# Patient Record
Sex: MALE | Race: WHITE | NOT HISPANIC OR LATINO | Employment: FULL TIME | URBAN - METROPOLITAN AREA
[De-identification: names, ages, dates, MRNs, and addresses within clinical notes are randomized per-mention and may not be internally consistent; named-entity substitution may affect disease eponyms.]

---

## 2017-02-27 ENCOUNTER — OFFICE VISIT (OUTPATIENT)
Dept: CARDIOLOGY | Facility: CLINIC | Age: 57
End: 2017-02-27

## 2017-02-27 VITALS
DIASTOLIC BLOOD PRESSURE: 80 MMHG | WEIGHT: 275.4 LBS | HEIGHT: 72 IN | BODY MASS INDEX: 37.3 KG/M2 | HEART RATE: 82 BPM | SYSTOLIC BLOOD PRESSURE: 148 MMHG

## 2017-02-27 DIAGNOSIS — E66.09 NON MORBID OBESITY DUE TO EXCESS CALORIES: ICD-10-CM

## 2017-02-27 DIAGNOSIS — I48.0 PAROXYSMAL ATRIAL FIBRILLATION (HCC): ICD-10-CM

## 2017-02-27 DIAGNOSIS — E11.59 TYPE 2 DIABETES MELLITUS WITH OTHER CIRCULATORY COMPLICATION: Primary | ICD-10-CM

## 2017-02-27 PROCEDURE — 93000 ELECTROCARDIOGRAM COMPLETE: CPT | Performed by: INTERNAL MEDICINE

## 2017-02-27 PROCEDURE — 99205 OFFICE O/P NEW HI 60 MIN: CPT | Performed by: INTERNAL MEDICINE

## 2017-02-27 RX ORDER — FLECAINIDE ACETATE 100 MG/1
100 TABLET ORAL 2 TIMES DAILY
COMMUNITY
End: 2017-08-01

## 2017-02-27 RX ORDER — LISINOPRIL 2.5 MG/1
2.5 TABLET ORAL DAILY
COMMUNITY
End: 2018-10-31

## 2017-06-23 ENCOUNTER — TELEPHONE (OUTPATIENT)
Dept: CARDIOLOGY | Facility: CLINIC | Age: 57
End: 2017-06-23

## 2017-06-23 NOTE — TELEPHONE ENCOUNTER
----- Message from Lashonda Hanna MA sent at 6/23/2017 12:38 PM EDT -----  Regarding: Patient needing appt  Contact: [phone]  Patient had to cancel his appt with Dr. Iniguez due to him having to go out of town.  Scheduling told him next available was January, and he would like to know if you could get him in sooner.    Thanks!

## 2017-08-01 ENCOUNTER — OFFICE VISIT (OUTPATIENT)
Dept: CARDIOLOGY | Facility: CLINIC | Age: 57
End: 2017-08-01

## 2017-08-01 VITALS
DIASTOLIC BLOOD PRESSURE: 80 MMHG | SYSTOLIC BLOOD PRESSURE: 110 MMHG | BODY MASS INDEX: 36.44 KG/M2 | HEIGHT: 72 IN | WEIGHT: 269 LBS | HEART RATE: 68 BPM

## 2017-08-01 DIAGNOSIS — I48.0 PAROXYSMAL ATRIAL FIBRILLATION (HCC): Primary | ICD-10-CM

## 2017-08-01 PROCEDURE — 99214 OFFICE O/P EST MOD 30 MIN: CPT | Performed by: INTERNAL MEDICINE

## 2017-08-01 RX ORDER — FLECAINIDE ACETATE 50 MG/1
50 TABLET ORAL 2 TIMES DAILY
Start: 2017-08-01 | End: 2017-08-14 | Stop reason: SDUPTHER

## 2017-08-01 NOTE — PROGRESS NOTES
Date of Office Visit: 2017  Encounter Provider: Alan Iniguez MD  Place of Service: [facility] CARDIOLOGY  Patient Name: Dallas Hurt  :1960  [id number]    Chief Complaint   Patient presents with   • Atrial Fibrillation   :     HPI: Dallas Hurt is a 57 y.o. male  here for follow-up of his paroxysmal A. fib he's been doing okay flow is about 6 pounds since I saw him last studies 269 pounds it is a start he is a gentleman that had A. fib in the past it was asymptomatic noted during a colonoscopy he's been aggressively treated he had an A. fib ablation then he was treated with T [place] and then with sotalol and now is been on flecainide he's never had symptoms with this he had a normal stress test normal echo in  is been feeling okay although he does not like the taste of the flecainide    Past Medical History:   Diagnosis Date   • Atrial fibrillation    • Atrial flutter    • Diabetes mellitus    • Heart disease    • Hyperlipidemia    • Hypertension    • Low testosterone        Past Surgical History:   Procedure Laterality Date   • ABLATION OF DYSRHYTHMIC FOCUS     • COLONOSCOPY         Social History     Social History   • Marital status:      Spouse name: N/A   • Number of children: N/A   • Years of education: N/A     Occupational History   • Not on file.     Social History Main Topics   • Smoking status: Former Smoker   • Smokeless tobacco: Not on file   • Alcohol use No   • Drug use: No   • Sexual activity: Not on file     Other Topics Concern   • Not on file     Social History Narrative       Family History   Problem Relation Age of Onset   • Diabetes Other    • Diabetes Mother    • Atrial fibrillation Father    • Diabetes Father    • Cancer Father    • Atrial fibrillation Brother    • Hypertension Brother    • Aneurysm Paternal Grandmother        Review of Systems   Constitution: Negative for decreased appetite, fever, malaise/fatigue and weight loss.    HENT: Negative for nosebleeds.    Eyes: Negative for double vision.   Cardiovascular: Negative for chest pain, claudication, cyanosis, dyspnea on exertion, irregular heartbeat, leg swelling, near-syncope, orthopnea, palpitations, paroxysmal nocturnal dyspnea and syncope.   Respiratory: Negative for cough, hemoptysis and shortness of breath.    Hematologic/Lymphatic: Negative for bleeding problem.   Skin: Negative for rash.   Musculoskeletal: Negative for falls and myalgias.   Gastrointestinal: Negative for hematochezia, jaundice, melena, nausea and vomiting.   Genitourinary: Negative for hematuria.   Neurological: Negative for dizziness and seizures.   Psychiatric/Behavioral: Negative for altered mental status and memory loss.       Allergies   Allergen Reactions   • Morphine And Related          Current Outpatient Prescriptions:   •  atorvastatin (LIPITOR) 20 MG tablet, Take 20 mg by mouth Daily., Disp: , Rfl:   •  cetirizine (zyrTEC) 10 MG tablet, Take 10 mg by mouth Daily., Disp: , Rfl:   •  cyclobenzaprine (FLEXERIL) 10 MG tablet, Take 10 mg by mouth 3 (Three) Times a Day As Needed for muscle spasms., Disp: , Rfl:   •  dapagliflozin (FARXIGA) 5 MG tablet tablet, Take 5 mg by mouth Daily., Disp: , Rfl:   •  Dulaglutide (TRULICITY) 0.75 MG/0.5ML solution pen-injector, Inject  under the skin., Disp: , Rfl:   •  flecainide (TAMBOCOR) 50 MG tablet, Take 1 tablet by mouth 2 (Two) Times a Day., Disp: , Rfl:   •  lisinopril (PRINIVIL,ZESTRIL) 2.5 MG tablet, Take 2.5 mg by mouth Daily., Disp: , Rfl:   •  metFORMIN (GLUCOPHAGE) 500 MG tablet, Take 500 mg by mouth 2 (Two) Times a Day With Meals., Disp: , Rfl:   •  montelukast (SINGULAIR) 10 MG tablet, Take 10 mg by mouth Every Night., Disp: , Rfl:   •  naproxen sodium (ALEVE) 220 MG tablet, Take 220 mg by mouth 2 (Two) Times a Day As Needed for mild pain (1-3)., Disp: , Rfl:   •  omeprazole (priLOSEC) 20 MG capsule, Take 20 mg by mouth Daily., Disp: , Rfl:   •   "rivaroxaban (XARELTO) 20 MG tablet, Take 20 mg by mouth Daily., Disp: , Rfl:   •  sildenafil (VIAGRA) 100 MG tablet, Take 100 mg by mouth Daily As Needed for erectile dysfunction., Disp: , Rfl:   •  SITagliptin (JANUVIA) 100 MG tablet, Take 100 mg by mouth Daily., Disp: , Rfl:      Objective:     Vitals:    08/01/17 1450   BP: 110/80   Pulse: 68   Weight: 269 lb (122 kg)   Height: 72\" (182.9 cm)     Body mass index is 36.48 kg/(m^2).    Physical Exam   Constitutional: He is oriented to person, place, and time. He appears well-developed and well-nourished.   overweight   HENT:   Head: Normocephalic.   Eyes: No scleral icterus.   Neck: No JVD present. No thyromegaly present.   Cardiovascular: Normal rate, regular rhythm and normal heart sounds.  Exam reveals no gallop and no friction rub.    No murmur heard.  Pulmonary/Chest: Effort normal and breath sounds normal. He has no wheezes. He has no rales.   Abdominal: Soft. There is no hepatosplenomegaly. There is no tenderness.   Musculoskeletal: Normal range of motion. He exhibits no edema.   Lymphadenopathy:     He has no cervical adenopathy.   Neurological: He is alert and oriented to person, place, and time.   Skin: Skin is warm and dry. No rash noted.   Psychiatric: He has a normal mood and affect.         ECG 12 Lead  Date/Time: 8/1/2017 3:46 PM  Performed by: EDITH DEL VALLE  Authorized by: EDITH DEL VALLE   Clinical impression: abnormal ECG  Comments: Junctional rhythm             Assessment:       Diagnosis Plan   1. Paroxysmal atrial fibrillation            Plan:       If this point is in a junctional rhythm today is fairly asymptomatic with an ominous stop his digoxin and cut his flecainide and have to 50 mg twice a day, have him come back and see us in 6 months I'm encouraged him to try and lose more weight he's been asymptomatic with his A. fib heHas a chads 2 Vascor 0 and has not anticoagulated    As always, it has been a pleasure to participate in your " patient's care.    Atrial Fibrillation and Atrial Flutter  Assessment  • The patient has paroxysmal atrial fibrillation  • This is non-valvular in etiology  • The patient's CHADS2-VASc score is 0  • A HAF5AC2-QTSb score of 0 is considered a low risk for a thromboembolic event    Plan  • Attempt to maintain sinus rhythm  • Continue flecainide for rhythm control      Sincerely,       Alan Iniguez MD

## 2017-08-14 RX ORDER — FLECAINIDE ACETATE 50 MG/1
50 TABLET ORAL 2 TIMES DAILY
Qty: 60 TABLET | Refills: 3 | Status: SHIPPED | OUTPATIENT
Start: 2017-08-14 | End: 2017-12-13 | Stop reason: SDUPTHER

## 2017-12-13 RX ORDER — FLECAINIDE ACETATE 50 MG/1
TABLET ORAL
Qty: 180 TABLET | Refills: 3 | Status: SHIPPED | OUTPATIENT
Start: 2017-12-13 | End: 2018-12-11 | Stop reason: SDUPTHER

## 2018-01-12 ENCOUNTER — CONVERSION ENCOUNTER (OUTPATIENT)
Dept: FAMILY MEDICINE CLINIC | Facility: CLINIC | Age: 58
End: 2018-01-12

## 2018-01-12 ENCOUNTER — OFFICE VISIT CONVERTED (OUTPATIENT)
Dept: FAMILY MEDICINE CLINIC | Facility: CLINIC | Age: 58
End: 2018-01-12
Attending: NURSE PRACTITIONER

## 2018-02-02 ENCOUNTER — OFFICE VISIT (OUTPATIENT)
Dept: CARDIOLOGY | Facility: CLINIC | Age: 58
End: 2018-02-02

## 2018-02-02 VITALS
HEART RATE: 79 BPM | DIASTOLIC BLOOD PRESSURE: 68 MMHG | OXYGEN SATURATION: 98 % | HEIGHT: 72 IN | SYSTOLIC BLOOD PRESSURE: 118 MMHG | BODY MASS INDEX: 36.7 KG/M2 | WEIGHT: 271 LBS

## 2018-02-02 DIAGNOSIS — I48.0 PAROXYSMAL ATRIAL FIBRILLATION (HCC): Primary | ICD-10-CM

## 2018-02-02 PROCEDURE — 93000 ELECTROCARDIOGRAM COMPLETE: CPT | Performed by: PHYSICIAN ASSISTANT

## 2018-02-02 PROCEDURE — 99213 OFFICE O/P EST LOW 20 MIN: CPT | Performed by: PHYSICIAN ASSISTANT

## 2018-02-02 RX ORDER — DOCUSATE SODIUM 100 MG/1
100 CAPSULE, LIQUID FILLED ORAL DAILY
COMMUNITY

## 2018-02-02 RX ORDER — AMOXICILLIN 875 MG/1
TABLET, COATED ORAL
Refills: 0 | COMMUNITY
Start: 2018-01-31 | End: 2018-10-31

## 2018-02-02 RX ORDER — EMPAGLIFLOZIN, METFORMIN HYDROCHLORIDE 12.5; 1 MG/1; MG/1
1 TABLET, EXTENDED RELEASE ORAL 2 TIMES DAILY
COMMUNITY
Start: 2018-01-31 | End: 2021-07-01

## 2018-02-02 RX ORDER — BENZONATATE 100 MG/1
100 CAPSULE ORAL 2 TIMES DAILY PRN
COMMUNITY
Start: 2018-01-12 | End: 2018-10-31

## 2018-02-02 RX ORDER — DULAGLUTIDE 3 MG/.5ML
1.5 INJECTION, SOLUTION SUBCUTANEOUS WEEKLY
COMMUNITY
Start: 2018-01-19 | End: 2021-09-03

## 2018-02-02 NOTE — PROGRESS NOTES
Date of Office Visit: 2018  Encounter Provider: SERA Ybarra  Place of Service: [facility] CARDIOLOGY  Patient Name: Dallas Hurt  :1960    Chief Complaint   Patient presents with   • Atrial Fibrillation     6 month follow up   :     HPI: Dallas Hurt is a 57 y.o. male, new to me, who presents today for follow-up.  Old records have been obtained and reviewed by me.  He is a patient of Dr. Iniguez's with a past cardiac history significant for paroxysmal atrial fibrillation.  He has had an ablation, and then since then has been treated with Tikosyn, sotalol, and now flecainide.  He had a normal stress echocardiogram in .  He was last in our office to see Dr. Iniguez on 2017.  At that visit he was doing well from a cardiac standpoint.  He had lost about 6 pounds.  He was in a junctional rhythm, and Dr. Iniguez discontinued his digitoxin and decreased his flecainide to 50 mg twice a day.  He is not anticoagulated secondary to a chads 2 vascular score of 0.  He's here today for 6 month follow-up.   Over the past 6 months he's been doing fairly well.  He is very active.  He has made slow changes to his diet to help lose weight.  For example, he stopped eating candy bars.  He switched from regular Pepsi to Coke Zero.  He is stopped eating dairy products for the most part.  He denies any chest pain, palpitations, shortness of breath, edema, dizziness, or syncope.  He is very active draining emergency personnel.  He used to weigh 305 pounds, he's down to 270.      Past Medical History:   Diagnosis Date   • Atrial fibrillation    • Atrial flutter    • Diabetes mellitus    • Heart disease    • Hyperlipidemia    • Hypertension    • Low testosterone        Past Surgical History:   Procedure Laterality Date   • ABLATION OF DYSRHYTHMIC FOCUS     • COLONOSCOPY         Social History     Social History   • Marital status:      Spouse name: N/A   • Number of children: N/A    • Years of education: N/A     Occupational History   • Not on file.     Social History Main Topics   • Smoking status: Former Smoker   • Smokeless tobacco: Never Used   • Alcohol use No   • Drug use: No   • Sexual activity: Defer     Other Topics Concern   • Not on file     Social History Narrative       Family History   Problem Relation Age of Onset   • Diabetes Other    • Diabetes Mother    • Atrial fibrillation Father    • Diabetes Father    • Cancer Father    • Atrial fibrillation Brother    • Hypertension Brother    • Aneurysm Paternal Grandmother    • No Known Problems Maternal Grandmother    • No Known Problems Maternal Grandfather    • No Known Problems Paternal Grandfather        Review of Systems   Constitution: Negative for chills, fever, malaise/fatigue, weight gain and weight loss.   HENT: Negative for ear pain, hearing loss, nosebleeds and sore throat.    Eyes: Negative for double vision, pain and visual disturbance.   Cardiovascular: Negative for chest pain, dyspnea on exertion, irregular heartbeat, leg swelling, near-syncope, orthopnea, palpitations, paroxysmal nocturnal dyspnea and syncope.   Respiratory: Negative for cough, shortness of breath, sleep disturbances due to breathing, snoring and wheezing.    Endocrine: Negative for cold intolerance, heat intolerance and polyuria.   Skin: Negative for itching and rash.   Musculoskeletal: Negative for joint pain, joint swelling and myalgias.   Gastrointestinal: Negative for abdominal pain, diarrhea, melena, nausea and vomiting.   Genitourinary: Negative for frequency, hematuria and hesitancy.   Neurological: Negative for excessive daytime sleepiness, headaches, light-headedness, numbness, paresthesias and seizures.   Psychiatric/Behavioral: Negative for altered mental status and depression.   Allergic/Immunologic: Negative.    All other systems reviewed and are negative.      Allergies   Allergen Reactions   • Morphine And Related          Current  "Outpatient Prescriptions:   •  amoxicillin (AMOXIL) 875 MG tablet, take 1 tablet by mouth twice a day for 10 days, Disp: , Rfl: 0  •  atorvastatin (LIPITOR) 20 MG tablet, Take 20 mg by mouth Daily., Disp: , Rfl:   •  benzonatate (TESSALON) 100 MG capsule, Take 100 mg by mouth 2 (Two) Times a Day As Needed., Disp: , Rfl:   •  cetirizine (zyrTEC) 10 MG tablet, Take 10 mg by mouth Daily., Disp: , Rfl:   •  cyclobenzaprine (FLEXERIL) 10 MG tablet, Take 10 mg by mouth 3 (Three) Times a Day As Needed for muscle spasms., Disp: , Rfl:   •  docusate sodium (COLACE) 100 MG capsule, Take 100 mg by mouth 2 (Two) Times a Day., Disp: , Rfl:   •  flecainide (TAMBOCOR) 50 MG tablet, TAKE ONE TABLET BY MOUTH TWICE DAILY, Disp: 180 tablet, Rfl: 3  •  lisinopril (PRINIVIL,ZESTRIL) 2.5 MG tablet, Take 2.5 mg by mouth Daily., Disp: , Rfl:   •  montelukast (SINGULAIR) 10 MG tablet, Take 10 mg by mouth Every Night., Disp: , Rfl:   •  naproxen sodium (ALEVE) 220 MG tablet, Take 320 mg by mouth 2 (Two) Times a Day As Needed for Mild Pain ., Disp: , Rfl:   •  omeprazole (priLOSEC) 20 MG capsule, Take 20 mg by mouth Daily., Disp: , Rfl:   •  rivaroxaban (XARELTO) 20 MG tablet, Take 20 mg by mouth Daily., Disp: , Rfl:   •  SITagliptin (JANUVIA) 100 MG tablet, Take 100 mg by mouth Daily., Disp: , Rfl:   •  SYNJARDY XR  MG tablet sustained-release 24 hour, Take 1 tablet by mouth Daily., Disp: , Rfl:   •  TRULICITY 1.5 MG/0.5ML solution pen-injector, Inject 1.5 mg as directed 1 (One) Time Per Week., Disp: , Rfl:      Objective:     Vitals:    02/02/18 1401 02/02/18 1418   BP: 110/64 118/68   BP Location: Right arm Left arm   Pulse: 79    SpO2: 98%    Weight: 123 kg (271 lb)    Height: 182.9 cm (72\")      Body mass index is 36.75 kg/(m^2).    PHYSICAL EXAM:    Physical Exam   Constitutional: He is oriented to person, place, and time. He appears well-developed and well-nourished. No distress.   HENT:   Head: Normocephalic and atraumatic. "   Eyes: Pupils are equal, round, and reactive to light.   Neck: No JVD present. No thyromegaly present.   Cardiovascular: Normal rate, regular rhythm, normal heart sounds and intact distal pulses.    No murmur heard.  Pulmonary/Chest: Effort normal and breath sounds normal. No respiratory distress.   Abdominal: Soft. Bowel sounds are normal. He exhibits no distension. There is no splenomegaly or hepatomegaly. There is no tenderness.   Musculoskeletal: Normal range of motion. He exhibits no edema.   Neurological: He is alert and oriented to person, place, and time.   Skin: Skin is warm and dry. He is not diaphoretic. No erythema.   Psychiatric: He has a normal mood and affect. His behavior is normal. Judgment normal.         ECG 12 Lead  Date/Time: 2/2/2018 2:25 PM  Performed by: ROSANGELA STEWART.  Authorized by: ROSANGELA STEWART.   Comparison: compared with previous ECG from 8/1/2017  Similar to previous ECG  Rhythm: sinus rhythm  BPM: 79  Conduction: 1st degree  Clinical impression: abnormal ECG  Comments: Indication: Paroxysmal atrial fibrillation.              Assessment:       Diagnosis Plan   1. Paroxysmal atrial fibrillation  ECG 12 Lead     Orders Placed This Encounter   Procedures   • ECG 12 Lead     This order was created via procedure documentation          Plan:       1.  Atrial Fibrillation and Atrial Flutter  Assessment  • The patient has paroxysmal atrial fibrillation  • This is non-valvular in etiology  • The patient's CHADS2-VASc score is 2  • A [id number] score of 2 or more is considered a high risk for a thromboembolic event  • Rivaroxaban prescribed    Plan  • Attempt to maintain sinus rhythm  • Continue rivaroxaban for antithrombotic therapy, bleeding issues discussed  • Continue flecainide for rhythm control    Subjective - Objective  • Overall he's doing well.  He has made some changes to his diet and has lost about 30 pounds overall.  There still room for improvement.  He is in sinus rhythm  today on flecainide, he is anticoagulated on Xarelto and not having any difficulty.  I did discuss with him ways that he can improve on his diet, and I have challenged him to try to lose 10 more pounds by the time he gets to see Dr. Iniguez in 6 months.  He states that he likes a challenge, and we'll try to work on it.  He did have some complaints of erectile dysfunction today, he has tried Viagra and it only gives him a headache.  From my standpoint, he needs to follow-up with his PCP or maybe even see a urologist.  He has no complaints of angina or heart failure, I'm not going to make any changes to his medical regimen and he will follow-up with Dr. Iniguez in 6 months.        As always, it has been a pleasure to participate in your patient's care.      Sincerely,         Kirstie Wagner PA-C

## 2018-02-12 ENCOUNTER — TELEPHONE (OUTPATIENT)
Dept: CARDIOLOGY | Facility: CLINIC | Age: 58
End: 2018-02-12

## 2018-05-17 ENCOUNTER — CONVERSION ENCOUNTER (OUTPATIENT)
Dept: FAMILY MEDICINE CLINIC | Facility: CLINIC | Age: 58
End: 2018-05-17

## 2018-05-17 ENCOUNTER — OFFICE VISIT CONVERTED (OUTPATIENT)
Dept: FAMILY MEDICINE CLINIC | Facility: CLINIC | Age: 58
End: 2018-05-17
Attending: NURSE PRACTITIONER

## 2018-10-31 ENCOUNTER — OFFICE VISIT (OUTPATIENT)
Dept: CARDIOLOGY | Facility: CLINIC | Age: 58
End: 2018-10-31

## 2018-10-31 VITALS
SYSTOLIC BLOOD PRESSURE: 120 MMHG | HEIGHT: 72 IN | WEIGHT: 274 LBS | BODY MASS INDEX: 37.11 KG/M2 | DIASTOLIC BLOOD PRESSURE: 70 MMHG | HEART RATE: 74 BPM

## 2018-10-31 DIAGNOSIS — I48.0 PAROXYSMAL ATRIAL FIBRILLATION (HCC): Primary | ICD-10-CM

## 2018-10-31 DIAGNOSIS — E66.09 NON MORBID OBESITY DUE TO EXCESS CALORIES: ICD-10-CM

## 2018-10-31 PROCEDURE — 99214 OFFICE O/P EST MOD 30 MIN: CPT | Performed by: INTERNAL MEDICINE

## 2018-10-31 PROCEDURE — 93000 ELECTROCARDIOGRAM COMPLETE: CPT | Performed by: INTERNAL MEDICINE

## 2018-10-31 RX ORDER — SILDENAFIL 100 MG/1
100 TABLET, FILM COATED ORAL DAILY PRN
COMMUNITY
End: 2020-12-14

## 2018-10-31 RX ORDER — LANSOPRAZOLE 30 MG/1
30 CAPSULE, DELAYED RELEASE ORAL DAILY
COMMUNITY
End: 2021-06-24

## 2018-12-11 ENCOUNTER — OFFICE VISIT CONVERTED (OUTPATIENT)
Dept: FAMILY MEDICINE CLINIC | Facility: CLINIC | Age: 58
End: 2018-12-11
Attending: NURSE PRACTITIONER

## 2018-12-11 RX ORDER — FLECAINIDE ACETATE 50 MG/1
TABLET ORAL
Qty: 180 TABLET | Refills: 3 | Status: SHIPPED | OUTPATIENT
Start: 2018-12-11 | End: 2019-12-16 | Stop reason: SDUPTHER

## 2019-04-15 ENCOUNTER — HOSPITAL ENCOUNTER (OUTPATIENT)
Dept: LAB | Facility: HOSPITAL | Age: 59
Discharge: HOME | End: 2019-04-15
Attending: NURSE PRACTITIONER

## 2019-04-15 LAB
EST. AVERAGE GLUCOSE BLD GHB EST-MCNC: 217 MG/DL
HBA1C MFR BLD: 9.2 % (ref 3.5–5.7)

## 2019-06-04 ENCOUNTER — HOSPITAL ENCOUNTER (OUTPATIENT)
Dept: DIABETES SERVICES | Facility: HOSPITAL | Age: 59
Discharge: HOME | End: 2019-06-04
Attending: NURSE PRACTITIONER

## 2019-06-04 ENCOUNTER — OFFICE VISIT CONVERTED (OUTPATIENT)
Dept: DIABETES SERVICES | Facility: HOSPITAL | Age: 59
End: 2019-06-04
Attending: NURSE PRACTITIONER

## 2019-06-21 ENCOUNTER — OFFICE VISIT CONVERTED (OUTPATIENT)
Dept: FAMILY MEDICINE CLINIC | Facility: CLINIC | Age: 59
End: 2019-06-21
Attending: NURSE PRACTITIONER

## 2019-06-21 ENCOUNTER — HOSPITAL ENCOUNTER (OUTPATIENT)
Dept: LAB | Facility: HOSPITAL | Age: 59
Discharge: HOME | End: 2019-06-21
Attending: NURSE PRACTITIONER

## 2019-06-21 LAB
ALBUMIN SERPL-MCNC: 4.5 G/DL (ref 3.5–5)
ALBUMIN/GLOB SERPL: 1.5 {RATIO} (ref 1.4–2.6)
ALP SERPL-CCNC: 84 U/L (ref 56–119)
ALT SERPL-CCNC: 35 U/L (ref 10–40)
ANION GAP SERPL CALC-SCNC: 24 MMOL/L (ref 8–19)
AST SERPL-CCNC: 20 U/L (ref 15–50)
BASOPHILS # BLD AUTO: 0.09 10*3/UL (ref 0–0.2)
BASOPHILS NFR BLD AUTO: 1.2 % (ref 0–3)
BILIRUB SERPL-MCNC: 0.48 MG/DL (ref 0.2–1.3)
BUN SERPL-MCNC: 16 MG/DL (ref 5–25)
BUN/CREAT SERPL: 14 {RATIO} (ref 6–20)
CALCIUM SERPL-MCNC: 9.8 MG/DL (ref 8.7–10.4)
CHLORIDE SERPL-SCNC: 98 MMOL/L (ref 99–111)
CHOLEST SERPL-MCNC: 164 MG/DL (ref 107–200)
CHOLEST/HDLC SERPL: 4.4 {RATIO} (ref 3–6)
CONV ABS IMM GRAN: 0.07 10*3/UL (ref 0–0.2)
CONV CO2: 20 MMOL/L (ref 22–32)
CONV CREATININE URINE, RANDOM: 55.7 MG/DL (ref 10–300)
CONV IMMATURE GRAN: 0.9 % (ref 0–1.8)
CONV MICROALBUM.,U,RANDOM: 31.2 MG/L (ref 0–20)
CONV TOTAL PROTEIN: 7.6 G/DL (ref 6.3–8.2)
CREAT UR-MCNC: 1.15 MG/DL (ref 0.7–1.2)
DEPRECATED RDW RBC AUTO: 39.8 FL (ref 35.1–43.9)
EOSINOPHIL # BLD AUTO: 0.13 10*3/UL (ref 0–0.7)
EOSINOPHIL # BLD AUTO: 1.7 % (ref 0–7)
ERYTHROCYTE [DISTWIDTH] IN BLOOD BY AUTOMATED COUNT: 12.1 % (ref 11.6–14.4)
EST. AVERAGE GLUCOSE BLD GHB EST-MCNC: 217 MG/DL
GFR SERPLBLD BASED ON 1.73 SQ M-ARVRAT: >60 ML/MIN/{1.73_M2}
GLOBULIN UR ELPH-MCNC: 3.1 G/DL (ref 2–3.5)
GLUCOSE SERPL-MCNC: 186 MG/DL (ref 70–99)
HBA1C MFR BLD: 16.9 G/DL (ref 14–18)
HBA1C MFR BLD: 9.2 % (ref 3.5–5.7)
HCT VFR BLD AUTO: 53 % (ref 42–52)
HDLC SERPL-MCNC: 37 MG/DL (ref 40–60)
LDLC SERPL CALC-MCNC: 84 MG/DL (ref 70–100)
LYMPHOCYTES # BLD AUTO: 1.9 10*3/UL (ref 1–5)
MCH RBC QN AUTO: 28.7 PG (ref 27–31)
MCHC RBC AUTO-ENTMCNC: 31.9 G/DL (ref 33–37)
MCV RBC AUTO: 90.1 FL (ref 80–96)
MICROALBUMIN/CREAT UR: 56 MG/G{CRE} (ref 0–25)
MONOCYTES # BLD AUTO: 0.75 10*3/UL (ref 0.2–1.2)
MONOCYTES NFR BLD AUTO: 9.7 % (ref 3–10)
NEUTROPHILS # BLD AUTO: 4.82 10*3/UL (ref 2–8)
NEUTROPHILS NFR BLD AUTO: 62 % (ref 30–85)
NRBC CBCN: 0 % (ref 0–0.7)
OSMOLALITY SERPL CALC.SUM OF ELEC: 292 MOSM/KG (ref 273–304)
PLATELET # BLD AUTO: 221 10*3/UL (ref 130–400)
PMV BLD AUTO: 10.9 FL (ref 9.4–12.4)
POTASSIUM SERPL-SCNC: 4.4 MMOL/L (ref 3.5–5.3)
PSA SERPL-MCNC: 0.37 NG/ML (ref 0–4)
RBC # BLD AUTO: 5.88 10*6/UL (ref 4.7–6.1)
SODIUM SERPL-SCNC: 138 MMOL/L (ref 135–147)
T4 FREE SERPL-MCNC: 1.1 NG/DL (ref 0.9–1.8)
TRIGL SERPL-MCNC: 214 MG/DL (ref 40–150)
TSH SERPL-ACNC: 2.23 M[IU]/L (ref 0.27–4.2)
VARIANT LYMPHS NFR BLD MANUAL: 24.5 % (ref 20–45)
VLDLC SERPL-MCNC: 43 MG/DL (ref 5–37)
WBC # BLD AUTO: 7.76 10*3/UL (ref 4.8–10.8)

## 2019-07-02 ENCOUNTER — OFFICE VISIT CONVERTED (OUTPATIENT)
Dept: DIABETES SERVICES | Facility: HOSPITAL | Age: 59
End: 2019-07-02
Attending: NURSE PRACTITIONER

## 2019-07-02 ENCOUNTER — HOSPITAL ENCOUNTER (OUTPATIENT)
Dept: GENERAL RADIOLOGY | Facility: HOSPITAL | Age: 59
Discharge: HOME | End: 2019-07-02
Attending: NURSE PRACTITIONER

## 2019-07-02 ENCOUNTER — HOSPITAL ENCOUNTER (OUTPATIENT)
Dept: DIABETES SERVICES | Facility: HOSPITAL | Age: 59
Setting detail: RECURRING SERIES
Discharge: HOME | End: 2019-07-31
Attending: NURSE PRACTITIONER

## 2019-09-04 ENCOUNTER — OFFICE VISIT CONVERTED (OUTPATIENT)
Dept: DIABETES SERVICES | Facility: HOSPITAL | Age: 59
End: 2019-09-04
Attending: NURSE PRACTITIONER

## 2019-09-04 ENCOUNTER — HOSPITAL ENCOUNTER (OUTPATIENT)
Dept: OTHER | Facility: HOSPITAL | Age: 59
Discharge: HOME | End: 2019-09-04
Attending: NURSE PRACTITIONER

## 2019-09-04 ENCOUNTER — HOSPITAL ENCOUNTER (OUTPATIENT)
Dept: DIABETES SERVICES | Facility: HOSPITAL | Age: 59
Discharge: HOME | End: 2019-09-04
Attending: NURSE PRACTITIONER

## 2019-09-04 LAB
EST. AVERAGE GLUCOSE BLD GHB EST-MCNC: 235 MG/DL
HBA1C MFR BLD: 9.8 % (ref 3.5–5.7)

## 2019-10-21 ENCOUNTER — HOSPITAL ENCOUNTER (OUTPATIENT)
Dept: OTHER | Facility: HOSPITAL | Age: 59
Discharge: HOME | End: 2019-10-21
Attending: NURSE PRACTITIONER

## 2019-10-21 LAB
EST. AVERAGE GLUCOSE BLD GHB EST-MCNC: 212 MG/DL
HBA1C MFR BLD: 9 % (ref 3.5–5.7)

## 2019-10-22 ENCOUNTER — OFFICE VISIT CONVERTED (OUTPATIENT)
Dept: DIABETES SERVICES | Facility: HOSPITAL | Age: 59
End: 2019-10-22
Attending: NURSE PRACTITIONER

## 2019-12-11 ENCOUNTER — OFFICE VISIT (OUTPATIENT)
Dept: CARDIOLOGY | Facility: CLINIC | Age: 59
End: 2019-12-11

## 2019-12-11 VITALS
HEART RATE: 80 BPM | BODY MASS INDEX: 36.7 KG/M2 | SYSTOLIC BLOOD PRESSURE: 126 MMHG | HEIGHT: 72 IN | DIASTOLIC BLOOD PRESSURE: 70 MMHG | WEIGHT: 271 LBS

## 2019-12-11 DIAGNOSIS — I48.0 PAROXYSMAL ATRIAL FIBRILLATION (HCC): ICD-10-CM

## 2019-12-11 DIAGNOSIS — E66.09 NON MORBID OBESITY DUE TO EXCESS CALORIES: ICD-10-CM

## 2019-12-11 DIAGNOSIS — E11.59 TYPE 2 DIABETES MELLITUS WITH OTHER CIRCULATORY COMPLICATION, WITHOUT LONG-TERM CURRENT USE OF INSULIN (HCC): Primary | ICD-10-CM

## 2019-12-11 PROCEDURE — 99213 OFFICE O/P EST LOW 20 MIN: CPT | Performed by: INTERNAL MEDICINE

## 2019-12-11 RX ORDER — DIPHENHYDRAMINE HCL 25 MG
25 TABLET ORAL AS NEEDED
COMMUNITY
End: 2022-11-10

## 2019-12-11 RX ORDER — BENZONATATE 100 MG/1
100 CAPSULE ORAL AS NEEDED
COMMUNITY
End: 2021-06-23

## 2019-12-11 NOTE — PROGRESS NOTES
Date of Office Visit:19  Encounter Provider: Alan Iniguez MD  Place of Service: [facility] CARDIOLOGY  Patient Name: Dallas Hurt  :1960  [id number]    Chief Complaint   Patient presents with   • Atrial Fibrillation   :     HPI: Dallas Hurt is a 59 y.o. male  he's a gentleman that's had paroxysmal A. fib and is here for follow-up  He had a history of an AFib ablation in the past prior to seeing me.  He then had a recurrence and had been tried on Tikosyn, sotalol, and when I saw him, I switched him off that on to flecainide and he has done well.  He has not had any A. fib that he knows of sounds like just about everyone in his family has had A. fib he even has a young son that had one an ablation recently.  He otherwise is been doing well without symptoms.  He has a chads 2 Vascor of 2 in the past we have not really wanted to treat him with anticoagulation but he has recently been diagnosed with diabetes and so previously it was a chads 2 Vascor of 0.  He has a fantastic job teaching farm rescue    Past Medical History:   Diagnosis Date   • Atrial fibrillation (CMS/HCC)    • Atrial flutter (CMS/HCC)    • Diabetes mellitus (CMS/HCC)    • Hyperlipidemia    • Low testosterone        Past Surgical History:   Procedure Laterality Date   • ABLATION OF DYSRHYTHMIC FOCUS     • COLONOSCOPY         Social History     Socioeconomic History   • Marital status:      Spouse name: Not on file   • Number of children: Not on file   • Years of education: Not on file   • Highest education level: Not on file   Tobacco Use   • Smoking status: Former Smoker   • Smokeless tobacco: Never Used   Substance and Sexual Activity   • Alcohol use: No   • Drug use: No   • Sexual activity: Defer       Family History   Problem Relation Age of Onset   • Diabetes Other    • Diabetes Mother    • Atrial fibrillation Father    • Diabetes Father    • Cancer Father    • Atrial fibrillation Brother    •  Hypertension Brother    • Aneurysm Paternal Grandmother    • No Known Problems Maternal Grandmother    • No Known Problems Maternal Grandfather    • No Known Problems Paternal Grandfather        Review of Systems   Constitution: Negative for decreased appetite, fever, malaise/fatigue and weight loss.   HENT: Negative for nosebleeds.    Eyes: Negative for double vision.   Cardiovascular: Negative for chest pain, claudication, cyanosis, dyspnea on exertion, irregular heartbeat, leg swelling, near-syncope, orthopnea, palpitations, paroxysmal nocturnal dyspnea and syncope.   Respiratory: Negative for cough, hemoptysis and shortness of breath.    Hematologic/Lymphatic: Negative for bleeding problem.   Skin: Negative for rash.   Musculoskeletal: Negative for falls and myalgias.   Gastrointestinal: Negative for hematochezia, jaundice, melena, nausea and vomiting.   Genitourinary: Negative for hematuria.   Neurological: Negative for dizziness and seizures.   Psychiatric/Behavioral: Negative for altered mental status and memory loss.       Allergies   Allergen Reactions   • Morphine And Related          Current Outpatient Medications:   •  atorvastatin (LIPITOR) 20 MG tablet, Take 20 mg by mouth Daily., Disp: , Rfl:   •  benzonatate (TESSALON) 100 MG capsule, Take 100 mg by mouth As Needed for Cough., Disp: , Rfl:   •  cetirizine (zyrTEC) 10 MG tablet, Take 10 mg by mouth Daily., Disp: , Rfl:   •  cyclobenzaprine (FLEXERIL) 10 MG tablet, Take 10 mg by mouth 3 (Three) Times a Day As Needed for muscle spasms., Disp: , Rfl:   •  diphenhydrAMINE (BENADRYL) 25 MG tablet, Take 25 mg by mouth As Needed for Itching., Disp: , Rfl:   •  docusate sodium (COLACE) 100 MG capsule, Take 100 mg by mouth 2 (Two) Times a Day., Disp: , Rfl:   •  Empagliflozin-metFORMIN HCl (SYNJARDY) 12.5-1000 MG tablet, Take  by mouth 2 (Two) Times a Day., Disp: , Rfl:   •  flecainide (TAMBOCOR) 50 MG tablet, TAKE ONE TABLET BY MOUTH TWICE DAILY, Disp: 180  "tablet, Rfl: 3  •  lansoprazole (PREVACID) 30 MG capsule, Take 30 mg by mouth Daily., Disp: , Rfl:   •  montelukast (SINGULAIR) 10 MG tablet, Take 10 mg by mouth Every Night., Disp: , Rfl:   •  naproxen sodium (ALEVE) 220 MG tablet, Take 320 mg by mouth 2 (Two) Times a Day As Needed for Mild Pain ., Disp: , Rfl:   •  sildenafil (VIAGRA) 100 MG tablet, Take 100 mg by mouth Daily As Needed for erectile dysfunction., Disp: , Rfl:   •  SITagliptin (JANUVIA) 100 MG tablet, Take 100 mg by mouth Daily., Disp: , Rfl:   •  TRULICITY 1.5 MG/0.5ML solution pen-injector, Inject 1.5 mg as directed 1 (One) Time Per Week., Disp: , Rfl:   •  SYNJARDY XR  MG tablet sustained-release 24 hour, Take 1 tablet by mouth Daily., Disp: , Rfl:      Objective:     Vitals:    12/11/19 1048   BP: 126/70   Pulse: 80   Weight: 123 kg (271 lb)   Height: 182.9 cm (72\")     Body mass index is 36.75 kg/m².    Physical Exam   Constitutional: He is oriented to person, place, and time. He appears well-developed and well-nourished.   overweight   HENT:   Head: Normocephalic.   Eyes: No scleral icterus.   Neck: No JVD present. No thyromegaly present.   Cardiovascular: Normal rate, regular rhythm and normal heart sounds. Exam reveals no gallop and no friction rub.   No murmur heard.  Pulmonary/Chest: Effort normal and breath sounds normal. He has no wheezes. He has no rales.   Abdominal: Soft. There is no hepatosplenomegaly. There is no tenderness.   Musculoskeletal: Normal range of motion. He exhibits no edema.   Lymphadenopathy:     He has no cervical adenopathy.   Neurological: He is alert and oriented to person, place, and time.   Skin: Skin is warm and dry. No rash noted.   Psychiatric: He has a normal mood and affect.       Procedures     Assessment:       Diagnosis Plan   1. Type 2 diabetes mellitus with other circulatory complication, without long-term current use of insulin (CMS/HCC)     2. Paroxysmal atrial fibrillation (CMS/HCC)     3. " Non morbid obesity due to excess calories            Plan:       In general I think he is doing okay although now with his diabetes diagnosis he has an elevated chads 2 Vascor.  Working to stay off of the anticoagulant for right now it is a little bit of a judgment call I do not think he has much A. fib.  If he recurs with his A. fib then we cannot for sure anticoagulate him but also probably send him to see the EP service because he is been on a number of antiarrhythmics.  I asked him again to try and lose a little bit of weight if possible I will see him back in a year    As always, it has been a pleasure to participate in your patient's care.    Sincerely,       Alan Iniguez MD

## 2019-12-16 RX ORDER — FLECAINIDE ACETATE 50 MG/1
TABLET ORAL
Qty: 180 TABLET | Refills: 2 | Status: SHIPPED | OUTPATIENT
Start: 2019-12-16 | End: 2020-09-23

## 2020-01-16 ENCOUNTER — OFFICE VISIT CONVERTED (OUTPATIENT)
Dept: FAMILY MEDICINE CLINIC | Facility: CLINIC | Age: 60
End: 2020-01-16
Attending: NURSE PRACTITIONER

## 2020-09-23 RX ORDER — FLECAINIDE ACETATE 50 MG/1
TABLET ORAL
Qty: 180 TABLET | Refills: 2 | Status: SHIPPED | OUTPATIENT
Start: 2020-09-23 | End: 2021-06-04

## 2020-11-11 ENCOUNTER — HOSPITAL ENCOUNTER (OUTPATIENT)
Dept: OTHER | Facility: HOSPITAL | Age: 60
Discharge: HOME | End: 2020-11-11
Attending: INTERNAL MEDICINE

## 2020-11-11 ENCOUNTER — OFFICE VISIT CONVERTED (OUTPATIENT)
Dept: INTERNAL MEDICINE | Facility: CLINIC | Age: 60
End: 2020-11-11
Attending: INTERNAL MEDICINE

## 2020-11-11 LAB
ALBUMIN SERPL-MCNC: 4.4 G/DL (ref 3.5–5)
ALBUMIN/GLOB SERPL: 1.6 {RATIO} (ref 1.4–2.6)
ALP SERPL-CCNC: 71 U/L (ref 56–119)
ALT SERPL-CCNC: 43 U/L (ref 10–40)
ANION GAP SERPL CALC-SCNC: 21 MMOL/L (ref 8–19)
AST SERPL-CCNC: 29 U/L (ref 15–50)
BASOPHILS # BLD AUTO: 0.09 10*3/UL (ref 0–0.2)
BASOPHILS NFR BLD AUTO: 1 % (ref 0–3)
BILIRUB SERPL-MCNC: 0.39 MG/DL (ref 0.2–1.3)
BUN SERPL-MCNC: 14 MG/DL (ref 5–25)
BUN/CREAT SERPL: 13 {RATIO} (ref 6–20)
CALCIUM SERPL-MCNC: 9.5 MG/DL (ref 8.7–10.4)
CHLORIDE SERPL-SCNC: 99 MMOL/L (ref 99–111)
CHOLEST SERPL-MCNC: 167 MG/DL (ref 107–200)
CHOLEST/HDLC SERPL: 4.2 {RATIO} (ref 3–6)
CONV ABS IMM GRAN: 0.07 10*3/UL (ref 0–0.2)
CONV CO2: 20 MMOL/L (ref 22–32)
CONV CREATININE URINE, RANDOM: 57.3 MG/DL (ref 10–300)
CONV IMMATURE GRAN: 0.8 % (ref 0–1.8)
CONV MICROALBUM.,U,RANDOM: 15.8 MG/L (ref 0–20)
CONV TOTAL PROTEIN: 7.1 G/DL (ref 6.3–8.2)
CREAT UR-MCNC: 1.04 MG/DL (ref 0.7–1.2)
DEPRECATED RDW RBC AUTO: 38.1 FL (ref 35.1–43.9)
EOSINOPHIL # BLD AUTO: 0.14 10*3/UL (ref 0–0.7)
EOSINOPHIL # BLD AUTO: 1.6 % (ref 0–7)
ERYTHROCYTE [DISTWIDTH] IN BLOOD BY AUTOMATED COUNT: 12.1 % (ref 11.6–14.4)
EST. AVERAGE GLUCOSE BLD GHB EST-MCNC: 232 MG/DL
GFR SERPLBLD BASED ON 1.73 SQ M-ARVRAT: >60 ML/MIN/{1.73_M2}
GLOBULIN UR ELPH-MCNC: 2.7 G/DL (ref 2–3.5)
GLUCOSE SERPL-MCNC: 172 MG/DL (ref 70–99)
HBA1C MFR BLD: 9.7 % (ref 3.5–5.7)
HCT VFR BLD AUTO: 50.5 % (ref 42–52)
HDLC SERPL-MCNC: 40 MG/DL (ref 40–60)
HGB BLD-MCNC: 17.1 G/DL (ref 14–18)
LDLC SERPL CALC-MCNC: 91 MG/DL (ref 70–100)
LYMPHOCYTES # BLD AUTO: 2.51 10*3/UL (ref 1–5)
LYMPHOCYTES NFR BLD AUTO: 28.5 % (ref 20–45)
MCH RBC QN AUTO: 29.4 PG (ref 27–31)
MCHC RBC AUTO-ENTMCNC: 33.9 G/DL (ref 33–37)
MCV RBC AUTO: 86.8 FL (ref 80–96)
MICROALBUMIN/CREAT UR: 27.6 MG/G{CRE} (ref 0–25)
MONOCYTES # BLD AUTO: 0.9 10*3/UL (ref 0.2–1.2)
MONOCYTES NFR BLD AUTO: 10.2 % (ref 3–10)
NEUTROPHILS # BLD AUTO: 5.11 10*3/UL (ref 2–8)
NEUTROPHILS NFR BLD AUTO: 57.9 % (ref 30–85)
NRBC CBCN: 0 % (ref 0–0.7)
OSMOLALITY SERPL CALC.SUM OF ELEC: 285 MOSM/KG (ref 273–304)
PLATELET # BLD AUTO: 220 10*3/UL (ref 130–400)
PMV BLD AUTO: 10.9 FL (ref 9.4–12.4)
POTASSIUM SERPL-SCNC: 4.6 MMOL/L (ref 3.5–5.3)
RBC # BLD AUTO: 5.82 10*6/UL (ref 4.7–6.1)
SODIUM SERPL-SCNC: 135 MMOL/L (ref 135–147)
TRIGL SERPL-MCNC: 178 MG/DL (ref 40–150)
VLDLC SERPL-MCNC: 36 MG/DL (ref 5–37)
WBC # BLD AUTO: 8.82 10*3/UL (ref 4.8–10.8)

## 2020-12-14 ENCOUNTER — OFFICE VISIT (OUTPATIENT)
Dept: CARDIOLOGY | Facility: CLINIC | Age: 60
End: 2020-12-14

## 2020-12-14 VITALS — BODY MASS INDEX: 34.54 KG/M2 | WEIGHT: 255 LBS | HEIGHT: 72 IN

## 2020-12-14 DIAGNOSIS — I48.0 PAROXYSMAL ATRIAL FIBRILLATION (HCC): Primary | ICD-10-CM

## 2020-12-14 PROCEDURE — 99441 PR PHYS/QHP TELEPHONE EVALUATION 5-10 MIN: CPT | Performed by: NURSE PRACTITIONER

## 2020-12-14 RX ORDER — ASPIRIN 81 MG/1
81 TABLET ORAL DAILY
COMMUNITY
End: 2022-11-10

## 2020-12-14 NOTE — PROGRESS NOTES
Date of Office Visit: 2020  Encounter Provider: VENITA Feliciano  Place of Service: [facility]  Patient Name: Dallas Hurt  :1960    Chief Complaint   Patient presents with   • Atrial Fibrillation     1 YR FOLLOW UP   :     HPI: Dallas Hurt is a 60 y.o. male, new to me, who presents today for follow-up.  Old records have been obtained and reviewed by me.  He is a patient of Dr. Iniguez's with a past cardiac history significant for paroxysmal atrial fibrillation.  He has had an ablation in the past.  He developed recurrence and was tried on Tikosyn and sotalol.  Ultimately, he was switched to flecainide and fortunately has done well.  He has not been anticoagulated.   He was last seen in the office by Dr. Iniguez on 2019 at which time he was doing well with no complaints of angina or heart failure.  Dr. Iniguez did note that we would continue off of anticoagulation unless he developed recurrent atrial fibrillation.  He also recommended referring him to see EP if he were to develop any recurrent atrial fibrillation.  He was advised to follow-up in 1 year, and he has agreed to a telehealth visit for follow-up.   Over the past year, he has been doing well.  He denies any chest pain, shortness of breath, palpitations, edema, dizziness, or syncope.  Admittedly, he has not been exercising.  He states he has rather sedentary and spends a lot of time on his computer.  He has been having a lot of difficulty with his blood sugars and his PCP has made a few changes to his regimen.      Past Medical History:   Diagnosis Date   • Atrial fibrillation (CMS/HCC)    • Atrial flutter (CMS/HCC)    • Diabetes mellitus (CMS/HCC)    • Hyperlipidemia    • Low testosterone        Past Surgical History:   Procedure Laterality Date   • ABLATION OF DYSRHYTHMIC FOCUS     • COLONOSCOPY         Social History     Socioeconomic History   • Marital status:      Spouse name: Not  on file   • Number of children: Not on file   • Years of education: Not on file   • Highest education level: Not on file   Tobacco Use   • Smoking status: Former Smoker   • Smokeless tobacco: Never Used   • Tobacco comment: CAFFEINE USE: 2 CUPS COFFEE DAILY/ 2 LITER COKE ZERO DAILY   Substance and Sexual Activity   • Alcohol use: Yes     Alcohol/week: 3.0 standard drinks     Types: 3 Shots of liquor per week   • Drug use: No   • Sexual activity: Defer       Family History   Problem Relation Age of Onset   • Diabetes Other    • Diabetes Mother    • Atrial fibrillation Father    • Diabetes Father    • Cancer Father    • Atrial fibrillation Brother    • Hypertension Brother    • Aneurysm Paternal Grandmother    • No Known Problems Maternal Grandmother    • No Known Problems Maternal Grandfather    • No Known Problems Paternal Grandfather        Review of Systems   Constitution: Negative for chills, fever and malaise/fatigue.   Cardiovascular: Negative for chest pain, dyspnea on exertion, leg swelling, near-syncope, orthopnea, palpitations, paroxysmal nocturnal dyspnea and syncope.   Respiratory: Negative for cough and shortness of breath.    Musculoskeletal: Negative for joint pain, joint swelling and myalgias.   Gastrointestinal: Negative for abdominal pain, diarrhea, melena, nausea and vomiting.   Genitourinary: Negative for frequency and hematuria.   Neurological: Negative for light-headedness, numbness, paresthesias and seizures.   Allergic/Immunologic: Negative.    All other systems reviewed and are negative.      Allergies   Allergen Reactions   • Morphine And Related          Current Outpatient Medications:   •  aspirin 81 MG EC tablet, Take 81 mg by mouth Daily., Disp: , Rfl:   •  atorvastatin (LIPITOR) 20 MG tablet, Take 20 mg by mouth Daily., Disp: , Rfl:   •  cyclobenzaprine (FLEXERIL) 10 MG tablet, Take 10 mg by mouth 3 (Three) Times a Day As Needed for muscle spasms., Disp: , Rfl:   •  diphenhydrAMINE  "(BENADRYL) 25 MG tablet, Take 25 mg by mouth As Needed for Itching., Disp: , Rfl:   •  docusate sodium (COLACE) 100 MG capsule, Take 100 mg by mouth Daily., Disp: , Rfl:   •  Empagliflozin-metFORMIN HCl (SYNJARDY) 12.5-1000 MG tablet, Take  by mouth 2 (Two) Times a Day., Disp: , Rfl:   •  flecainide (TAMBOCOR) 50 MG tablet, Take 1 tablet by mouth twice daily, Disp: 180 tablet, Rfl: 2  •  lansoprazole (PREVACID) 30 MG capsule, Take 30 mg by mouth Daily., Disp: , Rfl:   •  montelukast (SINGULAIR) 10 MG tablet, Take 10 mg by mouth Every Night., Disp: , Rfl:   •  naproxen sodium (ALEVE) 220 MG tablet, Take 320 mg by mouth 2 (Two) Times a Day As Needed for Mild Pain . TAKING 2 TABS BID, Disp: , Rfl:   •  Synjardy XR 12.5-1000 MG tablet sustained-release 24 hour, Take 1 tablet by mouth 2 (two) times a day., Disp: , Rfl:   •  Trulicity 3 MG/0.5ML solution pen-injector, Inject 1.5 mg as directed 1 (One) Time Per Week., Disp: , Rfl:   •  benzonatate (TESSALON) 100 MG capsule, Take 100 mg by mouth As Needed for Cough., Disp: , Rfl:   •  cetirizine (zyrTEC) 10 MG tablet, Take 10 mg by mouth Daily., Disp: , Rfl:       Objective:     Vitals:    12/14/20 0936   Weight: 116 kg (255 lb)   Height: 182.9 cm (72\")     Body mass index is 34.58 kg/m².        Assessment:       Diagnosis Plan   1. Paroxysmal atrial fibrillation (CMS/HCC)       No orders of the defined types were placed in this encounter.         Plan:       I think he is stable and doing well.  He denies any symptoms of angina or heart failure.  He has had no recurrence of atrial fibrillation.  I did recommend he try to increase his physical activity.  Otherwise, I am not going to make any changes.  He will follow-up with Dr. Iniguez in 1 year or sooner if needed.      This patient has consented to a telehealth visit via telephone. The visit was scheduled as a telephone visit to comply with patient safety concerns in accordance with CDC recommendations.  All vitals " recorded within this visit are reported by the patient.  I spent 10 minutes in total including but not limited to the 5 minutes spent in direct conversation with this patient.       As always, it has been a pleasure to participate in your patient's care.      Sincerely,         VENITA Jeffries

## 2021-02-01 ENCOUNTER — OFFICE VISIT CONVERTED (OUTPATIENT)
Dept: INTERNAL MEDICINE | Facility: CLINIC | Age: 61
End: 2021-02-01
Attending: INTERNAL MEDICINE

## 2021-02-01 ENCOUNTER — HOSPITAL ENCOUNTER (OUTPATIENT)
Dept: OTHER | Facility: HOSPITAL | Age: 61
Discharge: HOME | End: 2021-02-01
Attending: INTERNAL MEDICINE

## 2021-02-01 ENCOUNTER — CONVERSION ENCOUNTER (OUTPATIENT)
Dept: INTERNAL MEDICINE | Facility: CLINIC | Age: 61
End: 2021-02-01

## 2021-02-01 LAB
APPEARANCE UR: CLEAR
BASOPHILS # BLD AUTO: 0.09 10*3/UL (ref 0–0.2)
BASOPHILS NFR BLD AUTO: 1 % (ref 0–3)
BILIRUB UR QL: NEGATIVE
COLOR UR: YELLOW
CONV ABS IMM GRAN: 0.04 10*3/UL (ref 0–0.2)
CONV COLLECTION SOURCE (UA): ABNORMAL
CONV IMMATURE GRAN: 0.4 % (ref 0–1.8)
CONV UROBILINOGEN IN URINE BY AUTOMATED TEST STRIP: 0.2 {EHRLICHU}/DL (ref 0.1–1)
DEPRECATED RDW RBC AUTO: 38.2 FL (ref 35.1–43.9)
EOSINOPHIL # BLD AUTO: 0.16 10*3/UL (ref 0–0.7)
EOSINOPHIL # BLD AUTO: 1.7 % (ref 0–7)
ERYTHROCYTE [DISTWIDTH] IN BLOOD BY AUTOMATED COUNT: 12 % (ref 11.6–14.4)
EST. AVERAGE GLUCOSE BLD GHB EST-MCNC: 197 MG/DL
GLUCOSE UR QL: >=1000 MG/DL
HBA1C MFR BLD: 8.5 % (ref 3.5–5.7)
HCT VFR BLD AUTO: 51.2 % (ref 42–52)
HGB BLD-MCNC: 17.5 G/DL (ref 14–18)
HGB UR QL STRIP: NEGATIVE
KETONES UR QL STRIP: NEGATIVE MG/DL
LEUKOCYTE ESTERASE UR QL STRIP: NEGATIVE
LYMPHOCYTES # BLD AUTO: 2.52 10*3/UL (ref 1–5)
LYMPHOCYTES NFR BLD AUTO: 26.9 % (ref 20–45)
MCH RBC QN AUTO: 29.6 PG (ref 27–31)
MCHC RBC AUTO-ENTMCNC: 34.2 G/DL (ref 33–37)
MCV RBC AUTO: 86.5 FL (ref 80–96)
MONOCYTES # BLD AUTO: 0.75 10*3/UL (ref 0.2–1.2)
MONOCYTES NFR BLD AUTO: 8 % (ref 3–10)
NEUTROPHILS # BLD AUTO: 5.81 10*3/UL (ref 2–8)
NEUTROPHILS NFR BLD AUTO: 62 % (ref 30–85)
NITRITE UR QL STRIP: NEGATIVE
NRBC CBCN: 0 % (ref 0–0.7)
PH UR STRIP.AUTO: 5 [PH] (ref 5–8)
PLATELET # BLD AUTO: 216 10*3/UL (ref 130–400)
PMV BLD AUTO: 10.6 FL (ref 9.4–12.4)
PROT UR QL: NEGATIVE MG/DL
RBC # BLD AUTO: 5.92 10*6/UL (ref 4.7–6.1)
SP GR UR: 1.03 (ref 1–1.03)
WBC # BLD AUTO: 9.37 10*3/UL (ref 4.8–10.8)

## 2021-02-02 LAB
ALBUMIN SERPL-MCNC: 4.5 G/DL (ref 3.5–5)
ALBUMIN/GLOB SERPL: 1.6 {RATIO} (ref 1.4–2.6)
ALP SERPL-CCNC: 69 U/L (ref 56–119)
ALT SERPL-CCNC: 47 U/L (ref 10–40)
ANION GAP SERPL CALC-SCNC: 24 MMOL/L (ref 8–19)
AST SERPL-CCNC: 28 U/L (ref 15–50)
BILIRUB SERPL-MCNC: 0.41 MG/DL (ref 0.2–1.3)
BUN SERPL-MCNC: 14 MG/DL (ref 5–25)
BUN/CREAT SERPL: 13 {RATIO} (ref 6–20)
CALCIUM SERPL-MCNC: 9.8 MG/DL (ref 8.7–10.4)
CHLORIDE SERPL-SCNC: 101 MMOL/L (ref 99–111)
CHOLEST SERPL-MCNC: 152 MG/DL (ref 107–200)
CHOLEST/HDLC SERPL: 3.6 {RATIO} (ref 3–6)
CONV CO2: 18 MMOL/L (ref 22–32)
CONV TOTAL PROTEIN: 7.4 G/DL (ref 6.3–8.2)
CREAT UR-MCNC: 1.07 MG/DL (ref 0.7–1.2)
GFR SERPLBLD BASED ON 1.73 SQ M-ARVRAT: >60 ML/MIN/{1.73_M2}
GLOBULIN UR ELPH-MCNC: 2.9 G/DL (ref 2–3.5)
GLUCOSE SERPL-MCNC: 139 MG/DL (ref 70–99)
HDLC SERPL-MCNC: 42 MG/DL (ref 40–60)
LDLC SERPL CALC-MCNC: 71 MG/DL (ref 70–100)
OSMOLALITY SERPL CALC.SUM OF ELEC: 289 MOSM/KG (ref 273–304)
POTASSIUM SERPL-SCNC: 4.7 MMOL/L (ref 3.5–5.3)
PSA SERPL-MCNC: 0.31 NG/ML (ref 0–4)
SODIUM SERPL-SCNC: 138 MMOL/L (ref 135–147)
TESTOST SERPL-MCNC: 266 NG/DL (ref 193–740)
TRIGL SERPL-MCNC: 197 MG/DL (ref 40–150)
VLDLC SERPL-MCNC: 39 MG/DL (ref 5–37)

## 2021-02-03 LAB
BACTERIA UR CULT: NORMAL
SARS-COV-2 AB SERPL QL IA: NEGATIVE

## 2021-03-22 ENCOUNTER — OFFICE VISIT CONVERTED (OUTPATIENT)
Dept: INTERNAL MEDICINE | Facility: CLINIC | Age: 61
End: 2021-03-22
Attending: INTERNAL MEDICINE

## 2021-05-10 NOTE — H&P
History and Physical      Patient Name: Dallas Hurt   Patient ID: [id number]   Sex: Male   YOB: 1960    Primary Care Provider: Judith NATHAN   Referring Provider: Judith NATHAN    Visit Date: November 11, 2020    Provider: Ana Moreno MD   Location: [facility]   Location Address: [address] [place] [address]  [id number]   Location Phone: [phone]          Chief Complaint  · wants refill of diflucan  · wants labs for a1c  · wants refills on all meds  · est. care      History Of Present Illness  Dallas Hurt is a 60 year old /White male who presents for evaluation and treatment of:      Chronic issues and establishment of care.    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    Today states that he feels well  No chest pain  No trouble breathing  Slight leg swelling but nothing worrisome to him    Admits he is not the best eater and does not want to change his dietary habits currently  Has worked with Nettie Wick on this in the past       Past Medical History  Disease Name Date Onset Notes   Afib --  --    Diabetes --  --    Diabetes Mellitus, Type II --  --    Heart Disease --  --    Hyperlipidemia --  --    Hypertension --  --    Low Testosterone --  --    Screening PSA (prostate specific antigen) 5/17/18 --          Past Surgical History  Procedure Name Date Notes   Ablation of aberrant conduction pathway 2012 he states that since then he hasn't been able to have good memory   Colonoscopy 12/28/16 --    heart surgery --  --          Medication List  Name Date Started Instructions   Aleve 220 mg oral capsule  take 2 capsules by oral route 2 times a day   aspirin 81 mg oral tablet,delayed release (DR/EC)  take 1 tablet (81 mg) by oral route once daily   atorvastatin 20 mg oral tablet 02/10/2020 TAKE 1 TABLET BY MOUTH ONCE DAILY   Benadryl 25 mg oral capsule  take 1 capsule (25 mg) by oral route every 6 hours as needed   benzonatate 100 mg oral  capsule 02/10/2020 TAKE 1 TO 2 CAPSULES BY MOUTH THREE TIMES DAILY AS NEEDED FOR COUGH   cyclobenzaprine 10 mg oral tablet 07/24/2020 TAKE 1 TABLET BY MOUTH THREE TIMES DAILY AS NEEDED FOR BACK PAIN   Diflucan 150 mg oral tablet 11/11/2020 take 1 tablet (150 mg) by oral route once repeat in 3 days for 2 days   docusate sodium 100 mg oral capsule  take 1 capsule (100 mg) by oral route once daily   flecainide 50 mg oral tablet  take 1 tablet by oral route 2 times a day   Januvia 100 mg oral tablet 08/25/2020 TAKE 1 TABLET BY MOUTH ONCE DAILY for 90 days   ketoconazole 2 % topical cream 11/11/2020 APPLY CREAM TOPICALLY TO THE AFFECTED AREA(S) TWICE DAILY.   lansoprazole 30 mg oral capsule,delayed release(DR/EC) 04/20/2020 TAKE 1 CAPSULE BY MOUTH ONCE DAILY BEFORE A MEAL   montelukast 10 mg oral tablet 06/30/2020 TAKE 1 TABLET BY MOUTH ONCE DAILY IN THE EVENING   Synjardy XR 12.5-1,000 mg oral tablet, IR - ER, biphasic 24hr  take 1 tablet, ir - er, biphasic 24hr by oral route 2 times a day   Trulicity 1.5 mg/0.5 mL subcutaneous pen injector 04/20/2020 INJECT 0.5 MLS (1.5MG) SUBCUTANEOUSLY EVERY 7 DAYS IN THE ABDOMEN, THIGH, OR UPPER ARM   Zyrtec 10 mg Oral tablet 11/14/2013 take 1 tablet (10 mg) by oral route once daily for 90 days         Allergy List  Allergen Name Date Reaction Notes   morphine --  --  --        Allergies Reconciled  Family Medical History  Disease Name Relative/Age Notes   Heart Disease Brother/  Father/   Mother   Diabetes, unspecified type Father/  Mother/   Mother; Father         Social History  Finding Status Start/Stop Quantity Notes   Alcohol Never --/-- --  --    Alcohol Use Current some day --/-- --  rarely drinks   lives with spouse --  --/-- --  --     --  --/-- --  --    . --  --/-- --  --    No known infection risk --  --/-- --  --    Recreational Drug Use Never --/-- --  no   Sedentary --  --/-- --  --    Tobacco Former 18/48 3ppd former smoker quit in 2008   Working --   --/-- --  --          Immunizations  NameDate Admin Mfg Trade Name Lot Number Route Inj VIS Given VIS Publication   Hepatitis A05/17/2018 SKB HAVRIX-ADULT  IM LD 05/17/2018 07/20/2016   Comments: pt tolerated well   Yugsvozjo86/30/2016 SKB Fluarix, quadrivalent, preservative free [id number] IM RD 11/30/2016 08/07/2015   Comments: Patient tolerated well.   Ljmamijzg3262/12/2015 NE PNEUMOVAX 23  NE NE 06/01/2016    Comments: recieved at [facility] pharmacy per patient.   Prevnar 1311/30/2016 WAL PREVNAR 13 [id number] IM LD 11/30/2016 04/24/2015   Comments: Patient tolerated well.   Tdap08/30/2016 SKB BOOSTRIX [id number] IM LD 08/30/2016 02/24/2015   Comments: Pt tolerated well.         Review of Systems  · Constitutional  o Denies  o : fever, fatigue, weight loss, weight gain  · Cardiovascular  o Denies  o : lower extremity edema, claudication, chest pressure, palpitations  · Respiratory  o Denies  o : shortness of breath, wheezing, frequent cough, hemoptysis, dyspnea on exertion  · Gastrointestinal  o Denies  o : nausea, vomiting, diarrhea, constipation, abdominal pain      Vitals  Date Time BP Position Site L\R Cuff Size HR RR TEMP (F) WT  HT  BMI kg/m2 BSA m2 O2 Sat FR L/min FiO2 HC       12/11/2018 11:46 /26 Sitting    80 - R  97.5 276lbs 0oz 6'   37.43 2.52 95 %      06/21/2019 08:40 /80 Sitting    76 - R   273lbs 0oz 6'   37.03 2.51 96 %      01/16/2020 01:48 /84 Sitting    88 - R   274lbs 2oz 6'   37.18 2.51       11/11/2020 10:42 /76 Sitting    74 - R 16 97.4 267lbs 2oz 6'   36.23 2.48 97 %  21%          Physical Examination  · Constitutional  o Appearance  o : obese, well developed  · Head and Face  o Head  o :   § Inspection  § : atraumatic, normocephalic  · Eyes  o Eyes  o : extraocular movements intact, no scleral icterus, no conjunctival injection  · Ears, Nose, Mouth and Throat  o Ears  o :   § External Ears  § : normal  o Nose  o :   § Intranasal Exam  § : nares patent  o Oral Cavity  o :    § Oral Mucosa  § : moist mucous membranes  · Respiratory  o Respiratory Effort  o : breathing comfortably, symmetric chest rise  o Auscultation of Lungs  o : clear to asculatation bilaterally, no wheezes, rales, or rhonchii  · Cardiovascular  o Heart  o :   § Auscultation of Heart  § : regular rate and rhythm, no murmurs, rubs, or gallops  o Peripheral Vascular System  o :   § Extremities  § : no edema  · Neurologic  o Mental Status Examination  o :   § Orientation  § : grossly oriented to person, place and time  o Gait and Station  o :   § Gait Screening  § : normal gait  · Psychiatric  o General  o : normal mood and affect          Assessment  · Diabetes Mellitus, Type II     250.00/E11.9  · Hypertension     401.9/I10  · Hyperlipidemia     272.4/E78.5  · Screening for depression     V79.0/Z13.89  · Diabetes mellitus, type 2     250.00/E11.9       Will check labs for chronic issues  Adjust meds as needed based on results    States that he gets yeast infections from his diabetic medications and he likes to have medicines on hand to treat the yeast infections whenever they show up     Problems Reconciled  Plan  · Orders  o ACO-18: Negative screen for clinical depression using a standardized tool () - V79.0/Z13.89 - 11/11/2020  o Diabetes 2 Panel (Urine Microalbumin, CMP, Lipid, A1c, ) [facility] (64838, 41193, 05443, 88357) - 250.00/E11.9 - 11/11/2020   need to increase Trulicity possibly?   o CBC with Auto Diff [facility] (17107) - 250.00/E11.9 - 11/11/2020  o CBC with Auto Diff [facility] (89700) - - 11/11/2020  o CMP [facility] (27038) - - 11/11/2020  o ACO-39: Current medications updated and reviewed (9179F, ) - - 11/11/2020  o ACO-18: Negative screen for clinical depression using a standardized tool () - - 11/11/2020  o ACO-14: Influenza immunization administered or previously received [facility] () - - 11/11/2020  o ACO-19: Colorectal cancer screening results documented and reviewed (1853F) - -  11/11/2020  · Medications  o Diflucan 150 mg oral tablet   SIG: take 1 tablet (150 mg) by oral route once repeat in 3 days for 2 days   DISP: (2) Tablet with 5 refills  Adjusted on 11/11/2020     o ketoconazole 2 % topical cream   SIG: APPLY CREAM TOPICALLY TO THE AFFECTED AREA(S) TWICE DAILY.   DISP: (1) Gram with 1 refills  Adjusted on 11/11/2020     o Medications have been Reconciled  o Transition of Care or Provider Policy  · Instructions  o Advised that cheeses and other sources of dairy fats, animal fats, fast food, and the extras (candy, pastries, pies, doughnuts and cookies) all contain LDL raising nutrients. Advised to increase fruits, vegetables, whole grains, and to monitor portion sizes.   o Depression Screen completed and scanned into the EMR under the designated folder within the patient's documents.  o Today's PHQ-9 result is _1__  o Patient was educated/instructed on their diagnosis, treatment and medications prior to discharge from the clinic today.            Electronically Signed by: Ana Moreno MD -Author on November 15, 2020 04:55:29 PM

## 2021-05-14 VITALS
WEIGHT: 261.37 LBS | TEMPERATURE: 98.8 F | DIASTOLIC BLOOD PRESSURE: 72 MMHG | OXYGEN SATURATION: 96 % | BODY MASS INDEX: 35.4 KG/M2 | SYSTOLIC BLOOD PRESSURE: 108 MMHG | HEART RATE: 80 BPM | HEIGHT: 72 IN

## 2021-05-14 VITALS
BODY MASS INDEX: 35.89 KG/M2 | WEIGHT: 265 LBS | TEMPERATURE: 97.2 F | OXYGEN SATURATION: 95 % | SYSTOLIC BLOOD PRESSURE: 122 MMHG | HEIGHT: 72 IN | DIASTOLIC BLOOD PRESSURE: 70 MMHG | HEART RATE: 88 BPM

## 2021-05-14 VITALS
DIASTOLIC BLOOD PRESSURE: 76 MMHG | RESPIRATION RATE: 16 BRPM | HEIGHT: 72 IN | OXYGEN SATURATION: 97 % | BODY MASS INDEX: 36.18 KG/M2 | HEART RATE: 74 BPM | WEIGHT: 267.12 LBS | TEMPERATURE: 97.4 F | SYSTOLIC BLOOD PRESSURE: 122 MMHG

## 2021-05-14 NOTE — PROGRESS NOTES
"   Progress Note      Patient Name: Dallas Hurt   Patient ID: [id number]   Sex: Male   YOB: 1960    Primary Care Provider: Ana Moreno MD   Referring Provider: Ana Moreno MD    Visit Date: March 22, 2021    Provider: Ana Moreno MD   Location: [facility]   Location Address: [address] [place] [address]  [place], [place]  [id number]   Location Phone: [phone]          Chief Complaint  · f/u     \"For diabetes\"       History Of Present Illness  Dallas Hurt is a 60 year old /White male who presents for evaluation and treatment of:      States he is doing well.       DM-II-  states he is doing well with meds. Would like to get more higher dose ketoconazole cream instead of lower dose for financial reasons. Uses this to prevent yeast infections.     Takes trulicity injection on sundays, gets little GI upset with this however likes the way it works and is willing to tolerate this. suguar is well controlled.     cholesterol-  takes meds as prescribed. no complaints.       GERD-  takes meds as prescribed, he states this is not working as he would like. Would like to descuss other options.        BPH/ED-  takes meds as prescribed.  Really enjoys the way that the medication is working urine flow has improved as well    No chest pain  No trouble breathing  No leg swelling       Past Medical History  Disease Name Date Onset Notes   Afib --  --    Diabetes --  --    Diabetes Mellitus, Type II --  --    Heart Disease --  --    Hyperlipidemia --  --    Hypertension --  --    Low Testosterone --  --    Screening PSA (prostate specific antigen) 5/17/18 --          Past Surgical History  Procedure Name Date Notes   Ablation of aberrant conduction pathway 2012 he states that since then he hasn't been able to have good memory   Colonoscopy 12/28/16 --    heart surgery --  --          Medication List  Name Date Started Instructions   Aleve 220 mg oral capsule  take 2 capsules by " oral route 2 times a day   aspirin 81 mg oral tablet,delayed release (DR/EC)  take 1 tablet (81 mg) by oral route once daily   atorvastatin 20 mg oral tablet 01/29/2021 TAKE 1 TABLET BY MOUTH ONCE DAILY   Benadryl 25 mg oral capsule  take 1 capsule (25 mg) by oral route every 6 hours as needed   Cialis 10 mg oral tablet 03/22/2021 take 1 tablet (10 mg) by oral route once daily   cyclobenzaprine 10 mg oral tablet 07/24/2020 TAKE 1 TABLET BY MOUTH THREE TIMES DAILY AS NEEDED FOR BACK PAIN   docusate sodium 100 mg oral capsule  take 1 capsule (100 mg) by oral route once daily   flecainide 50 mg oral tablet  take 1 tablet by oral route 2 times a day   ketoconazole 2 % topical cream 03/22/2021 APPLY CREAM TOPICALLY TO THE AFFECTED AREA(S) TWICE DAILY.   montelukast 10 mg oral tablet 03/23/2021 TAKE 1 TABLET BY MOUTH ONCE DAILY IN THE EVENING   Synjardy XR 12.5-1,000 mg oral tablet, IR - ER, biphasic 24hr 03/22/2021 take 1 tablet, ir - er, biphasic 24hr by oral route 2 times a day   Trulicity 1.5 mg/0.5 mL subcutaneous pen injector 12/15/2020 INJECT 1 MLS (3 MG) SUBCUTANEOUSLY EVERY 7 DAYS IN THE ABDOMEN, THIGH, OR UPPER ARM   Zyrtec 10 mg Oral tablet 11/14/2013 take 1 tablet (10 mg) by oral route once daily for 90 days         Allergy List  Allergen Name Date Reaction Notes   morphine --  --  --        Allergies Reconciled  Family Medical History  Disease Name Relative/Age Notes   Heart Disease Brother/  Father/   Mother   Diabetes, unspecified type Father/  Mother/   Mother; Father         Social History  Finding Status Start/Stop Quantity Notes   Alcohol Never --/-- --  --    Alcohol Use Current some day --/-- --  rarely drinks   lives with spouse --  --/-- --  --     --  --/-- --  --    . --  --/-- --  --    No known infection risk --  --/-- --  --    Recreational Drug Use Never --/-- --  no   Sedentary --  --/-- --  --    Tobacco Former 18/48 3ppd former smoker quit in 2008   Working --  --/-- --  --           Immunizations  NameDate Admin Mfg Trade Name Lot Number Route Inj VIS Given VIS Publication   Hepatitis A05/17/2018 SKB HAVRIX-ADULT  IM LD 05/17/2018 07/20/2016   Comments: pt tolerated well   Tvyjxkuek24/30/2016 SKB Fluarix, quadrivalent, preservative free [id number] IM RD 11/30/2016 08/07/2015   Comments: Patient tolerated well.   Gehlxzbwo9089/12/2015 NE PNEUMOVAX 23  NE NE 06/01/2016    Comments: recieved at [facility] pharmacy per patient.   Prevnar 1311/30/2016 WAL PREVNAR 13 [id number] IM LD 11/30/2016 04/24/2015   Comments: Patient tolerated well.   Tdap08/30/2016 SKB BOOSTRIX C295R IM LD 08/30/2016 02/24/2015   Comments: Pt tolerated well.         Vitals  Date Time BP Position Site L\R Cuff Size HR RR TEMP (F) WT  HT  BMI kg/m2 BSA m2 O2 Sat FR L/min FiO2 HC       11/11/2020 10:42 /76 Sitting    74 - R 16 97.4 267lbs 2oz 6'   36.23 2.48 97 %  21%    02/01/2021 02:10 /70 Sitting    88 - R  97.2 265lbs 0oz 6'   35.94 2.47 95 %  21%    03/22/2021 02:27 /72 Sitting    80 - R  98.8 261lbs 6oz 6'   35.45 2.45 96 %  21%          Physical Examination  · Constitutional  o Appearance  o : obese, well developed  · Head and Face  o Head  o :   § Inspection  § : atraumatic, normocephalic  · Eyes  o Eyes  o : extraocular movements intact, no scleral icterus, no conjunctival injection  · Respiratory  o Respiratory Effort  o : breathing comfortably, symmetric chest rise  o Auscultation of Lungs  o : clear to asculatation bilaterally, no wheezes, rales, or rhonchii  · Cardiovascular  o Heart  o :   § Auscultation of Heart  § : regular rate and rhythm, no murmurs, rubs, or gallops  o Peripheral Vascular System  o :   § Extremities  § : no edema  · Neurologic  o Mental Status Examination  o :   § Orientation  § : grossly oriented to person, place and time  o Gait and Station  o :   § Gait Screening  § : normal gait  · Psychiatric  o General  o : normal mood and affect              Assessment  · Diabetes  Mellitus, Type II     250.00/E11.9  · BPH (benign prostatic hyperplasia)     600.00/N40.0  · GERD (gastroesophageal reflux disease)     530.81/K21.9  Will try Dexilant     Other chronic issues stable continue current medications  Will check labs and adjust meds as needed based on results     Problems Reconciled  Plan  · Orders  o CBC with Auto Diff [facility] (03800) - 250.00/E11.9, 600.00/N40.0 - 03/22/2021  o CMP [facility] (26883) - 250.00/E11.9, 600.00/N40.0 - 03/22/2021  o Hgb A1c [facility] (56989) - 250.00/E11.9, 600.00/N40.0 - 03/22/2021  o Lipid Panel [facility] (80367) - 250.00/E11.9, 600.00/N40.0 - 03/22/2021  o ACO-39: Current medications updated and reviewed (1159F, ) - - 03/22/2021  · Medications  o Dexilant 60 mg oral capsule,biphase delayed releas   SIG: take 1 capsule (60 mg) by oral route once daily   DISP: (90) Capsule with 0 refills  Prescribed on 03/22/2021     o Cialis 10 mg oral tablet   SIG: take 1 tablet (10 mg) by oral route once daily   DISP: (90) Tablet with 0 refills  Adjusted on 03/22/2021     o ketoconazole 2 % topical cream   SIG: APPLY CREAM TOPICALLY TO THE AFFECTED AREA(S) TWICE DAILY.   DISP: (60) Gram with 2 refills  Adjusted on 03/22/2021     o Synjardy XR 12.5-1,000 mg oral tablet, IR - ER, biphasic 24hr   SIG: take 1 tablet, ir - er, biphasic 24hr by oral route 2 times a day   DISP: (180) Bottle with 1 refills  Adjusted on 03/22/2021     o lansoprazole 30 mg oral capsule,delayed release(DR/EC)   SIG: TAKE 1 CAPSULE BY MOUTH ONCE DAILY BEFORE A MEAL   DISP: (90) Each with 2 refills  Discontinued on 03/22/2021     o Medications have been Reconciled  o Transition of Care or Provider Policy  · Instructions  o Patient was educated/instructed on their diagnosis, treatment and medications prior to discharge from the clinic today.  · Disposition  o please print labs            Electronically Signed by: Ana Moreno MD -Author on March 31, 2021 07:51:23 AM

## 2021-05-14 NOTE — PROGRESS NOTES
"   Progress Note      Patient Name: Dallas Hurt   Patient ID: [id number]   Sex: Male   YOB: 1960    Primary Care Provider: Ana Moreno MD   Referring Provider: Ana Moreno MD    Visit Date: February 1, 2021    Provider: Ana Moreno MD   Location: [facility]   Location Address: [address] [place] [address]  [id number]   Location Phone: [phone]          Chief Complaint  · \"kidney pain\"      History Of Present Illness  aDllas Hurt is a 60 year old /White male who presents for evaluation and treatment of:      chronic issues.    DM II-  states that he would like to get his A1C rechecked  states that he thinks that he is doing better with his meds and he is hoping that his A1C will be down    he is having a burning sensation when he urinates  he is also having a little pain in his right lower back    since increasing his Trulicity he has started getting headaches  he has noticed it in the front of his head  it lasts about 15 minutes    he did have all of his top teeth removed recently         Past Medical History  Disease Name Date Onset Notes   Afib --  --    Diabetes --  --    Diabetes Mellitus, Type II --  --    Heart Disease --  --    Hyperlipidemia --  --    Hypertension --  --    Low Testosterone --  --    Screening PSA (prostate specific antigen) 5/17/18 --          Past Surgical History  Procedure Name Date Notes   Ablation of aberrant conduction pathway 2012 he states that since then he hasn't been able to have good memory   Colonoscopy 12/28/16 --    heart surgery --  --          Medication List  Name Date Started Instructions   Aleve 220 mg oral capsule  take 2 capsules by oral route 2 times a day   aspirin 81 mg oral tablet,delayed release (DR/EC)  take 1 tablet (81 mg) by oral route once daily   atorvastatin 20 mg oral tablet 01/29/2021 TAKE 1 TABLET BY MOUTH ONCE DAILY   Benadryl 25 mg oral capsule  take 1 capsule (25 mg) by oral " route every 6 hours as needed   cyclobenzaprine 10 mg oral tablet 07/24/2020 TAKE 1 TABLET BY MOUTH THREE TIMES DAILY AS NEEDED FOR BACK PAIN   docusate sodium 100 mg oral capsule  take 1 capsule (100 mg) by oral route once daily   flecainide 50 mg oral tablet  take 1 tablet by oral route 2 times a day   ketoconazole 2 % topical cream 02/01/2021 APPLY CREAM TOPICALLY TO THE AFFECTED AREA(S) TWICE DAILY.   lansoprazole 30 mg oral capsule,delayed release(DR/EC) 04/20/2020 TAKE 1 CAPSULE BY MOUTH ONCE DAILY BEFORE A MEAL   montelukast 10 mg oral tablet 02/01/2021 TAKE 1 TABLET BY MOUTH ONCE DAILY IN THE EVENING   Synjardy XR 12.5-1,000 mg oral tablet, IR - ER, biphasic 24hr  take 1 tablet, ir - er, biphasic 24hr by oral route 2 times a day   Trulicity 1.5 mg/0.5 mL subcutaneous pen injector 12/15/2020 INJECT 1 MLS (3 MG) SUBCUTANEOUSLY EVERY 7 DAYS IN THE ABDOMEN, THIGH, OR UPPER ARM   Zyrtec 10 mg Oral tablet 11/14/2013 take 1 tablet (10 mg) by oral route once daily for 90 days         Allergy List  Allergen Name Date Reaction Notes   morphine --  --  --        Allergies Reconciled  Family Medical History  Disease Name Relative/Age Notes   Heart Disease Brother/  Father/   Mother   Diabetes, unspecified type Father/  Mother/   Mother; Father         Social History  Finding Status Start/Stop Quantity Notes   Alcohol Never --/-- --  --    Alcohol Use Current some day --/-- --  rarely drinks   lives with spouse --  --/-- --  --     --  --/-- --  --    . --  --/-- --  --    No known infection risk --  --/-- --  --    Recreational Drug Use Never --/-- --  no   Sedentary --  --/-- --  --    Tobacco Former 18/48 3ppd former smoker quit in 2008   Working --  --/-- --  --          Immunizations  NameDate Admin Mfg Trade Name Lot Number Route Inj VIS Given VIS Publication   Hepatitis A05/17/2018 ROMINA HAVRIX-ADULT  IM LD 05/17/2018 07/20/2016   Comments: pt tolerated well   Ubwfrmrdh04/30/2016 ROMINA Fluarix,  quadrivalent, preservative free [id number] IM RD 11/30/2016 08/07/2015   Comments: Patient tolerated well.   Agaglablx4312/12/2015 NE PNEUMOVAX 23  NE NE 06/01/2016    Comments: recieved at [facility] pharmacy per patient.   Prevnar 1311/30/2016 WAL PREVNAR 13 [id number] IM LD 11/30/2016 04/24/2015   Comments: Patient tolerated well.   Tdap08/30/2016 SKB BOOSTRIX [id number] IM LD 08/30/2016 02/24/2015   Comments: Pt tolerated well.         Vitals  Date Time BP Position Site L\R Cuff Size HR RR TEMP (F) WT  HT  BMI kg/m2 BSA m2 O2 Sat FR L/min FiO2 HC       06/21/2019 08:40 /80 Sitting    76 - R   273lbs 0oz 6'   37.03 2.51 96 %      01/16/2020 01:48 /84 Sitting    88 - R   274lbs 2oz 6'   37.18 2.51       11/11/2020 10:42 /76 Sitting    74 - R 16 97.4 267lbs 2oz 6'   36.23 2.48 97 %  21%    02/01/2021 02:10 /70 Sitting    88 - R  97.2 265lbs 0oz 6'   35.94 2.47 95 %  21%          Physical Examination  · Constitutional  o Appearance  o : obese, well developed  · Head and Face  o Head  o :   § Inspection  § : atraumatic, normocephalic  · Eyes  o Eyes  o : extraocular movements intact, no scleral icterus, no conjunctival injection  · Respiratory  o Respiratory Effort  o : breathing comfortably, symmetric chest rise  o Auscultation of Lungs  o : clear to asculatation bilaterally, no wheezes, rales, or rhonchii  · Cardiovascular  o Heart  o :   § Auscultation of Heart  § : regular rate and rhythm, no murmurs, rubs, or gallops  o Peripheral Vascular System  o :   § Extremities  § : no edema  · Neurologic  o Mental Status Examination  o :   § Orientation  § : grossly oriented to person, place and time  o Gait and Station  o :   § Gait Screening  § : normal gait  · Psychiatric  o General  o : normal mood and affect          Results  · In-Office Procedures  o Lab procedure  § IOP - Urinalysis without Microscopy (Clinitek) [facility] ([id number])   § Color Ur: Yellow   § Clarity Ur: Clear   § Glucose Ur Ql Strip: 500 mg/dL    § Bilirub Ur Ql Strip: Negative   § Ketones Ur Ql Strip: Negative   § Sp Gr Ur Qn: 1.015   § Hgb Ur Ql Strip: Negative   § pH Ur-LsCnc: 5.0   § Prot Ur Ql Strip: Negative   § Urobilinogen Ur Strip-mCnc: 0.2 E.U./dL   § Nitrite Ur Ql Strip: Negative   § WBC Est Ur Ql Strip: Negative       Assessment  · Diabetes Mellitus, Type II     250.00/E11.9  will check labs  adjust meds as needed  · Screening for lipid disorders     V77.91/Z13.220  · Hypogonadism, male     257.2/E29.1  will check testosterone levels  · Dysuria     788.1/R30.0  ua normal  could be BPH  will start Cialis 5mg po daily  · Seborrheic dermatitis     690.10/L21.9    Problems Reconciled  Plan  · Orders  o PSA ultrasenstive diagnostic [facility] ([id number]) - 257.2/E29.1 - 02/01/2021  o Testosterone (Total) (57922) - 257.2/E29.1 - 02/01/2021  o CBC with Auto Diff [facility] ([id number]) - 250.00/E11.9, V77.91/Z13.220 - 02/01/2021  o CMP [facility] ([id number]) - 250.00/E11.9, V77.91/Z13.220 - 02/01/2021  o Lipid Panel [facility] ([id number]) - 250.00/E11.9, V77.91/Z13.220 - 02/01/2021  o Urinalysis with Reflex Microscopy ([facility]) ([id number]) - 788.1/R30.0 - 02/01/2021  o Urine culture (09989, 80847) - 788.1/R30.0 - 02/01/2021  o ACO-39: Current medications updated and reviewed (1159F, ) - - 02/01/2021  o SARS-CoV-2 Antibodies [facility] (Send out to [facility]) ([id number]) - 250.00/E11.9, V77.91/Z13.220 - 02/01/2021  o Hgb A1c [facility] ([id number]) - 250.00/E11.9, V77.91/Z13.220, 257.2/E29.1 - 02/01/2021  · Medications  o Cialis 5 mg oral tablet   SIG: take 1 tablet (5 mg) by oral route once daily   DISP: (90) Tablet with 0 refills  Prescribed on 02/01/2021     o ketoconazole 2 % topical cream   SIG: APPLY CREAM TOPICALLY TO THE AFFECTED AREA(S) TWICE DAILY.   DISP: (60) Gram with 2 refills  Adjusted on 02/01/2021     o Medications have been Reconciled  o Transition of Care or Provider Policy  · Instructions  o Patient was educated/instructed on their diagnosis, treatment and medications prior to discharge from the clinic  today.            Electronically Signed by: Ana Moreno MD -Author on February 1, 2021 11:38:02 PM

## 2021-05-15 VITALS
HEIGHT: 72 IN | BODY MASS INDEX: 36.98 KG/M2 | SYSTOLIC BLOOD PRESSURE: 120 MMHG | WEIGHT: 273 LBS | OXYGEN SATURATION: 96 % | DIASTOLIC BLOOD PRESSURE: 80 MMHG | HEART RATE: 76 BPM

## 2021-05-15 VITALS
BODY MASS INDEX: 37.13 KG/M2 | HEART RATE: 88 BPM | WEIGHT: 274.12 LBS | DIASTOLIC BLOOD PRESSURE: 84 MMHG | HEIGHT: 72 IN | SYSTOLIC BLOOD PRESSURE: 132 MMHG

## 2021-05-16 VITALS
TEMPERATURE: 97 F | DIASTOLIC BLOOD PRESSURE: 85 MMHG | OXYGEN SATURATION: 96 % | SYSTOLIC BLOOD PRESSURE: 131 MMHG | HEIGHT: 72 IN | HEART RATE: 75 BPM | WEIGHT: 272.12 LBS | BODY MASS INDEX: 36.86 KG/M2

## 2021-05-16 VITALS
WEIGHT: 277.5 LBS | HEART RATE: 69 BPM | DIASTOLIC BLOOD PRESSURE: 73 MMHG | SYSTOLIC BLOOD PRESSURE: 121 MMHG | HEIGHT: 72 IN | TEMPERATURE: 97.5 F | BODY MASS INDEX: 37.59 KG/M2

## 2021-05-16 VITALS
BODY MASS INDEX: 37.38 KG/M2 | OXYGEN SATURATION: 95 % | SYSTOLIC BLOOD PRESSURE: 118 MMHG | WEIGHT: 276 LBS | TEMPERATURE: 97.5 F | HEIGHT: 72 IN | DIASTOLIC BLOOD PRESSURE: 26 MMHG | HEART RATE: 80 BPM

## 2021-05-28 VITALS — BODY MASS INDEX: 37.39 KG/M2 | HEIGHT: 72 IN | WEIGHT: 276.03 LBS

## 2021-05-28 NOTE — PROGRESS NOTES
Patient: HAYLEY CHIANG     Acct: [id number]     Report: #[id number]  UNIT #: [id number]     : 1960    Encounter Date:2019  PRIMARY CARE:   ***Signed***  --------------------------------------------------------------------------------------------------------------------  Encounter Date      2019            Reason for Visit      This patient is seen in the office today for follow-up evaluation for diabetes     medication management.  He is a 59-year-old male patient with a history of     uncontrolled type 2 diabetes.  This patient is currently managed with Trulicity     1.5 mg weekly, Januvia 100 mg every day, and Synjardy 10/1000 every day.  The     patient states that he is monitoring his blood sugar 1-2 times each day and most    glucose levels are in the 200s.  He was seen by the dietitian this morning for     diet counseling and plans to make changes to his diet to help better control his    glucose levels.  In the meantime the patient is maxed out on his Januvia and     Trulicity but we are able to increase the Synjardy.  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            History            History: He denies any health issues or changes since last being seen.            Allergies/Medications      Allergies:        Coded Allergies:             MORPHINE (Verified  Allergy, Severe, ANAPHALAXIS, 16)      Medications    Last Reconciled on 19 5:51 pm by DIVINE BENAVIDES      (Synjardy) 10/1,000 TAB No Conflict Check      1 QDAY         Reported         6/10/19       Cyclobenzaprine Hcl (Cyclobenzaprine*) 10 Mg Tablet      10 MG PO TID PRN for MUSCLE SPASMS, TAB 0 Refills         Reported         6/10/19       Naproxen Sodium (Aleve) 220 Mg Tablet      220 MG PO QID, #100 TAB 0 Refills         Reported         6/10/19       Cetirizine Hcl (CETIRIZINE HCL) 10 Mg Tablet      10 MG PO QDAY,  #30 TAB 0 Refills         Reported         6/10/19       Lisinopril* (Lisinopril*) 2.5 Mg Tablet      2.5 MG PO QDAY, #30 TAB 0 Refills         Reported         6/10/19       Flecainide Acetate (Flecainide Acetate) 100 Mg Tablet      100 MG PO BID, TAB         Reported         6/10/19       Lansoprazole (Lansoprazole) 30 Mg Capsule.dr      30 MG PO QDAY, CAP         Reported         6/10/19       Dulaglutide (Trulicity) 1.5 Mg/0.5 Ml Pen.injctr      1.5 MG SUBQ Q7DAY, #1 PEN.INJCTR         Reported         6/10/19       Flecainide Acetate (Flecainide Acetate) 50 Mg Tablet      50 MG PO BID, TAB         Reported         7/30/16       sitaGLIPtin Phosphate (Januvia *) 100 Mg Tablet      100 MG PO QDAY, #30 TAB 0 Refills         Reported         7/30/16       Montelukast Sodium (Singulair*) 10 Mg Tab      10 MG PO QDAY, #30 TAB 0 Refills         Reported         7/30/16       Atorvastatin (Atorvastatin) 20 Mg Tab      20 MG PO HS, #30 TAB 0 Refills         Reported         7/30/16            Quality Measures      Current yr A1c more than 9:  Yes            Impression      Type 2 diabetes uncontrolled            Plan      At this time we will increase the Synjardy to 12.5 mg/1000 every day and have     the patient return for follow-up appointment in approximately 6 to 8 weeks.  If     this is not effective we can increase it to 25/2000 if tolerated by the patient.      Outside of this we may be forced to consider insulin.  This was discussed with     the patient.  The patient was strongly encouraged to implement dietary changes     and increase physical activity so as to facilitate better glucose control and     hopefully avoid the need to add insulin to his current treatment plan.  The     patient was also encouraged to increase his monitoring to 3-4 times each day.            Total time spent with patient was 15 minutes of which half of that time was     spent counseling the patient regarding medication management             Patient Education      Yes            PREVENTION      Hx Influenza Vaccination:  Yes      Date Influenza Vaccine Given:  Oct 1, 2018      Influenza Vaccine Declined:  No      2 or More Falls Past Year?:  No      Fall Past Year with Injury?:  No      Hx Pneumococcal Vaccination:  Yes      Encouraged to follow-up with:  PCP regarding preventative exams.                 Disclaimer: Converted document may not contain table formatting or lab diagrams. Please see [employer] for the authenticated document.

## 2021-05-28 NOTE — PROGRESS NOTES
Patient: HAYLEY CHIANG     Acct: [id number]     Report: #[id number]  UNIT #: [id number]     : 1960    Encounter Date:2019  PRIMARY CARE:   ***Signed***  --------------------------------------------------------------------------------------------------------------------  ADDENDUM: 19 1455          Addendum: DIVINE BENAVIDES on 19 @ 2:55 pm            The patient's A1c came back at 9.8% which is actually up from the previous     result.  The patient attributes this to his diet.  Patient was instructed to     increase the Synjardy as previously discussed.  He will be seen in a follow-up     appointment in 6 to 8 weeks.      Encounter Date      Sep 4, 2019            Reason for Visit      Patient is seen in the office today for follow-up evaluation for diabetes     medication management.  He is a 59-year-old male patient with a history of type     2 diabetes uncontrolled.  He is currently managed with Trulicity 1.5 mg weekly,     Januvia 100 mg every day, and Synjardy 12.1000 every day.  The Synjardy was     increased at his last appointment from a dose of 10/1000 to the 12.1000 that     he is taking today.  The patient reports that he feels like his blood sugars are    doing better although he is only randomly testing them.  Blood glucose levels     according to his notes are ranging from 157-265 however he is only doing a test     one time a day and sometimes only every 3 or 4 days during the week.  He denies     any problems since increasing the Synjardy.  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            Lab Results            Item Value  Date Time             Hemoglobin A1c 9.2 % H 19 0940             Hemoglobin A1c 9.8 % H 19 1045            History            History: He denies any health  issues or changes since last being seen.            Allergies/Medications      Allergies:        Coded Allergies:             MORPHINE (Verified  Allergy, Severe, ANAPHALAXIS, 12/28/16)      Medications    Last Reconciled on 9/16/19 2:55 pm by DIVINE BENAVIDES      (Synjardy) 12.5/1,000 TAB No Conflict Check      1 QDAY         Reported         6/10/19       Cyclobenzaprine Hcl (Cyclobenzaprine*) 10 Mg Tablet      10 MG PO TID PRN for MUSCLE SPASMS, TAB 0 Refills         Reported         6/10/19       Naproxen Sodium (Aleve) 220 Mg Tablet      220 MG PO QID, #100 TAB 0 Refills         Reported         6/10/19       Cetirizine Hcl (CETIRIZINE HCL) 10 Mg Tablet      10 MG PO QDAY, #30 TAB 0 Refills         Reported         6/10/19       Lisinopril* (Lisinopril*) 2.5 Mg Tablet      2.5 MG PO QDAY, #30 TAB 0 Refills         Reported         6/10/19       Flecainide Acetate (Flecainide Acetate) 100 Mg Tablet      100 MG PO BID, TAB         Reported         6/10/19       Lansoprazole (Lansoprazole) 30 Mg Capsule.dr      30 MG PO QDAY, CAP         Reported         6/10/19       Dulaglutide (Trulicity) 1.5 Mg/0.5 Ml Pen.injctr      1.5 MG SUBQ Q7DAY, #1 PEN.INJCTR         Reported         6/10/19       Flecainide Acetate (Flecainide Acetate) 50 Mg Tablet      50 MG PO BID, TAB         Reported         7/30/16       sitaGLIPtin Phosphate (Januvia *) 100 Mg Tablet      100 MG PO QDAY, #30 TAB 0 Refills         Reported         7/30/16       Montelukast Sodium (Singulair*) 10 Mg Tab      10 MG PO QDAY, #30 TAB 0 Refills         Reported         7/30/16       Atorvastatin (Atorvastatin) 20 Mg Tab      20 MG PO HS, #30 TAB 0 Refills         Reported         7/30/16            Quality Measures      Current yr A1c more than 9:  Yes            Impression      Type 2 diabetes uncontrolled            Plan      The patient request a repeat hemoglobin A1c to see if there has been any change     since increasing the Synjardy.  If there  has not been effective change we will     increase the Synjardy to 12.5/1000 twice a day instead of once a day.  Patient     plans to leave this appointment and have his A1c collected and I will call him     and make this change if necessary.            Total time spent with patient was 10 minutes of which half of that time was     spent counseling the patient regarding reinforcement of diet and monitoring            PREVENTION      Hx Influenza Vaccination:  Yes      Date Influenza Vaccine Given:  Oct 1, 2018      Influenza Vaccine Declined:  No      2 or More Falls Past Year?:  No      Fall Past Year with Injury?:  No      Hx Pneumococcal Vaccination:  Yes      Encouraged to follow-up with:  PCP regarding preventative exams.                 Disclaimer: Converted document may not contain table formatting or lab diagrams. Please see [employer] for the authenticated document.

## 2021-05-28 NOTE — PROGRESS NOTES
Patient: HAYLEY CHIANG     Acct: [id number]     Report: #[id number]  UNIT #: [id number]     : 1960    Encounter Date:10/22/2019  PRIMARY CARE:   ***Signed***  --------------------------------------------------------------------------------------------------------------------  Encounter Date      Oct 22, 2019            Reason for Visit      This patient returns to the office today for follow-up evaluation for diabetes     medication management.  He is a 59-year-old male patient with a history of type     2 diabetes uncontrolled.  He is currently managed with Trulicity 1.5 mg weekly,     Januvia 100 mg every day, and Synjardy 12.1000 every day.  He does not bring     blood glucose logs to his appointment today but states that he is noticed his     glucose levels are better and his A1c collected yesterday was down to 9.0%.  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  I spoke to the patient about the importance of getting his glucose     levels under better control for overall wellbeing however the patient feels like    he is doing the best that he can with his active lifestyle being on the road and    dining out frequently.  He states that his diet behaviors are very erratic but     he is trying to cut back on some of his carbohydrates.            Lab Results            Item Value  Date Time             Hemoglobin A1c 9.8 % H 19 1045             Hemoglobin A1c 9.0 % H 10/21/19 1417            History            History: He denies any health issues or changes since last being seen.            Allergies/Medications      Allergies:        Coded Allergies:             MORPHINE (Verified  Allergy, Severe, ANAPHALAXIS, 16)      Medications    Last Reconciled on 19 2:55 pm by DIVINE BENAVIDES      (Synjardy) 12.5/1,000 TAB No Conflict Check      1 QDAY         Reported         6/10/19       Cyclobenzaprine Hcl (Cyclobenzaprine*) 10 Mg  Tablet      10 MG PO TID PRN for MUSCLE SPASMS, TAB 0 Refills         Reported         6/10/19       Naproxen Sodium (Aleve) 220 Mg Tablet      220 MG PO QID, #100 TAB 0 Refills         Reported         6/10/19       Cetirizine Hcl (CETIRIZINE HCL) 10 Mg Tablet      10 MG PO QDAY, #30 TAB 0 Refills         Reported         6/10/19       Lisinopril* (Lisinopril*) 2.5 Mg Tablet      2.5 MG PO QDAY, #30 TAB 0 Refills         Reported         6/10/19       Flecainide Acetate (Flecainide Acetate) 100 Mg Tablet      100 MG PO BID, TAB         Reported         6/10/19       Lansoprazole (Lansoprazole) 30 Mg Capsule.dr      30 MG PO QDAY, CAP         Reported         6/10/19       Dulaglutide (Trulicity) 1.5 Mg/0.5 Ml Pen.injctr      1.5 MG SUBQ Q7DAY, #1 PEN.INJCTR         Reported         6/10/19       Flecainide Acetate (Flecainide Acetate) 50 Mg Tablet      50 MG PO BID, TAB         Reported         7/30/16       SITagliptin (Januvia) 100 Mg Tablet      100 MG PO QDAY, #30 TAB 0 Refills         Reported         7/30/16       Montelukast Sodium (Singulair*) 10 Mg Tab      10 MG PO QDAY, #30 TAB 0 Refills         Reported         7/30/16       Atorvastatin (Atorvastatin) 20 Mg Tab      20 MG PO HS, #30 TAB 0 Refills         Reported         7/30/16            Quality Measures      Current yr A1c more than 9:  Yes            Impression      Type 2 diabetes uncontrolled            Plan      As mentioned previously the patient was encouraged to improve his dietary     behavior as well as be more physically active so he can lower his glucose levels     and obtain a better A1c.  The patient was also encouraged to monitor his     glucose levels routinely 3-4 times each day.  At this time the patient is not     scheduled for follow-up appointment.  He states that he notices glucose levels     starting to go back up again he will come back for follow-up appointment     otherwise he will be managed by his primary care provider.             Total time spent with patient was 5 minutes of which half of that time was spent     counseling the patient regarding diet and exercise            Patient Education      ACO BMI High above 25:  Counseling Given, Encouraged weight loss, Encourage     dietary changes            PREVENTION      Hx Influenza Vaccination:  Yes      Date Influenza Vaccine Given:  Oct 1, 2018      Influenza Vaccine Declined:  No      Hx Pneumococcal Vaccination:  Yes      Encouraged to follow-up with:  PCP regarding preventative exams.            Electronically signed by DIVINE BENAVIDES  01/19/2020 16:49       Disclaimer: Converted document may not contain table formatting or lab diagrams. Please see [employer] for the authenticated document.

## 2021-05-28 NOTE — PROGRESS NOTES
Patient: HAYLEY CHIANG     Acct: [id number]     Report: #[id number]  UNIT #: [id number]     : 1960    Encounter Date:2019  PRIMARY CARE:   ***Signed***  --------------------------------------------------------------------------------------------------------------------  Encounter Date      2019            Reason for Visit      This patient is seen in the office today for evaluation for diabetes medication     management.  He is a 59-year-old male patient with a history of uncontrolled     type 2 diabetes.  He has been referred to our office by his primary care     provider VENITA Bowser.  The patient reports he has a 5 to 6-year history of    type 2 diabetes.  He has been managed on multiple oral hypoglycemic agents as     well as non-insulin injectable medications.  Currently the patient is taking     Trulicity 1.5 mg every week, Januvia 100 mg every day, and Synjardy XR      mg every day.  According to the patient he previously took Farxiga but did not     tolerate it due to frequent yeast infections.  He has tolerated the Synjardy     without similar problems.            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  He     prefers to be managed on oral hypoglycemic agents if at all possible so he does     not jeopardize his license.  He admits to poor dietary control of his diabetes     and he does not monitor his glucose levels regularly.  He denies any     hospitalizations or acute complications secondary to his diabetes.  He has had     no formal education regarding his diabetes.  He states he has not seen his PCP f    or several months.            An A1c collected in 2019 was 9.2%.            History and Physical      Diabetes Assessment      DIET: Patient states that he has a pretty liberal diet.  He does not drink     sugary drinks but states he does snack a lot and feels like he is hungry at     night and tends to eat more in the  evenings because of this      EXERCISE: He has no exercise plan      BG MONITORING: He rarely checks his blood sugar            Allergies/Medications      Allergies:        Coded Allergies:             MORPHINE (Verified  Allergy, Severe, ANAPHALAXIS, 12/28/16)      Medications    Last Reconciled on 6/10/19 2:33 pm by DIV[facility] BE[facility]ID[facility]      [facility] 10/[facility]000 TAB No Conflict Check      1 QDAY         Reported         6/10/19       Cyclobenzaprine Hcl (Cyclobenzaprine*) 10 Mg Tablet      10 MG PO TID PRN for MUSCLE SPASMS, TAB 0 Refills         Reported         6/10/19       Naproxen Sodium (Aleve) 220 Mg Tablet      220 MG PO QID, #100 TAB 0 Refills         Reported         6/10/19       Cetirizine Hcl (CETIRIZINE HCL) 10 Mg Tablet      10 MG PO QDAY, #30 TAB 0 Refills         Reported         6/10/19       Lisinopril* (Lisinopril*) 2.5 Mg Tablet      2.5 MG PO QDAY, #30 TAB 0 Refills         Reported         6/10/19       Flecainide Acetate (Flecainide Acetate) 100 Mg Tablet      100 MG PO BID, TAB         Reported         6/10/19       Lansoprazole (Lansoprazole) 30 Mg Capsule.dr      30 MG PO QDAY, CAP         Reported         6/10/19       Dulaglutide (Trulicity) 1.5 Mg/0.5 Ml Pen.injctr      1.5 MG SUBQ Q7DAY, #1 PEN.INJCTR         Reported         6/10/19       Flecainide Acetate (Flecainide Acetate) 50 Mg Tablet      50 MG PO BID, TAB         Reported         7/30/16       sitaGLIPtin Phosphate (Januvia *) 100 Mg Tablet      100 MG PO QDAY, #30 TAB 0 Refills         Reported         7/30/16       Montelukast Sodium (Singulair*) 10 Mg Tab      10 MG PO QDAY, #30 TAB 0 Refills         Reported         7/30/16       Atorvastatin (Atorvastatin) 20 Mg Tab      20 MG PO HS, #30 TAB 0 Refills         Reported         7/30/16            Review of Systems      CONSTITUTIONAL: Denies weight loss, denies weakness, but admits to some fatigue      ENDOCRINOLOGIC: Admits to polyuria, polyphagia, and polydipsia; denies      hypoglycemia, hypoglycemia unawareness or nocturnal hypoglycemia      SKIN: Denies rash, skin breakdown, or wounds      NEUROLOGICAL: Denies numbness or tingling in the extremities      EYES: Admits to some blurred vision      PSYCHIATRIC: Denies depression or anxiety.            EXAM      EXAM: Skin examination shows some small abrasions on the hands that are in     various stages of healing            Blood Glucose: 224      HT/WT/BMI:             Height 6 ft 0 in / 182.88 cm           Weight 276 lbs 0.6 oz / 125.672725 kg           BSA 2.44 m2           BMI 37.4 kg/m2            Formerly [facility]      Family History: Both maternal and paternal family history of diabetes            Past Medical History: Positive for atrial fibrillation, erectile dysfunction,     arthritis, hyperlipidemia, coronary artery disease            Past Surgical History: Cardiac ablation for correction of atrial fibrillation,     tonsillectomy, appendectomy, cardiac catheterization            Psychosocial History: [place], he lives at home with his wife and 2 grandchildren, he admits to     drinking whiskey every evening to help him sleep, he quit smoking in 2008, he     denies use of recreational drugs.            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  He admits to needing to be more compliant with     his diet to achieve better glycemic control.  He is requesting to see the     dietitian.  We will make an appointment with the dietitian as soon as possible.      He is currently maxed out on both the Trulicity and the Januvia he is currently     taking however the Synjardy is  not at maximum dose at this time.  We will     consider increasing that.  The patient also needs to begin monitoring his glu    cose levels on a regular basis.  He has a very active lifestyle due to the     nature of his work and may benefit from a rikki continuous glucose sensor to     improve his compliance.  The patient is agreeable to discuss this further at his     next appointment.  He will be scheduled for a another follow-up appointment     within the next month.            Total time spent with patient was 20 minutes of which half of that time was     spent counseling the patient regarding medication treatment options and diet            Patient Education      Yes      ACO BMI High above 25:  Counseling Given, Encouraged weight loss, Encourage     dietary changes, Referred to dietician            PREVENTION      Hx Influenza Vaccination:  Yes      Date Influenza Vaccine Given:  Oct 1, 2018      Influenza Vaccine Declined:  No      2 or More Falls Past Year?:  No      Fall Past Year with Injury?:  No      Hx Pneumococcal Vaccination:  Yes      Encouraged to follow-up with:  PCP regarding preventative exams.                 Disclaimer: Converted document may not contain table formatting or lab diagrams. Please see [employer] for the authenticated document.

## 2021-06-04 RX ORDER — FLECAINIDE ACETATE 50 MG/1
TABLET ORAL
Qty: 180 TABLET | Refills: 2 | Status: SHIPPED | OUTPATIENT
Start: 2021-06-04 | End: 2022-02-14

## 2021-06-08 RX ORDER — ATORVASTATIN CALCIUM 20 MG/1
TABLET, FILM COATED ORAL
Qty: 90 TABLET | Refills: 0 | Status: SHIPPED | OUTPATIENT
Start: 2021-06-08 | End: 2021-09-06

## 2021-06-23 ENCOUNTER — OFFICE VISIT (OUTPATIENT)
Dept: INTERNAL MEDICINE | Facility: CLINIC | Age: 61
End: 2021-06-23

## 2021-06-23 VITALS
OXYGEN SATURATION: 97 % | WEIGHT: 258.4 LBS | BODY MASS INDEX: 35 KG/M2 | DIASTOLIC BLOOD PRESSURE: 76 MMHG | TEMPERATURE: 97.7 F | HEIGHT: 72 IN | SYSTOLIC BLOOD PRESSURE: 122 MMHG | HEART RATE: 76 BPM

## 2021-06-23 DIAGNOSIS — R68.2 DRY MOUTH, UNSPECIFIED: ICD-10-CM

## 2021-06-23 DIAGNOSIS — Z12.2 ENCOUNTER FOR SCREENING FOR MALIGNANT NEOPLASM OF LUNG IN FORMER SMOKER WHO QUIT IN PAST 15 YEARS WITH 30 PACK YEAR HISTORY OR GREATER: ICD-10-CM

## 2021-06-23 DIAGNOSIS — L98.9 SKIN LESION: ICD-10-CM

## 2021-06-23 DIAGNOSIS — G44.52 NEW DAILY PERSISTENT HEADACHE: ICD-10-CM

## 2021-06-23 DIAGNOSIS — Z87.891 ENCOUNTER FOR SCREENING FOR MALIGNANT NEOPLASM OF LUNG IN FORMER SMOKER WHO QUIT IN PAST 15 YEARS WITH 30 PACK YEAR HISTORY OR GREATER: ICD-10-CM

## 2021-06-23 DIAGNOSIS — R41.3 MEMORY CHANGE: Primary | ICD-10-CM

## 2021-06-23 DIAGNOSIS — E11.59 TYPE 2 DIABETES MELLITUS WITH OTHER CIRCULATORY COMPLICATION, WITHOUT LONG-TERM CURRENT USE OF INSULIN (HCC): ICD-10-CM

## 2021-06-23 PROCEDURE — 80061 LIPID PANEL: CPT | Performed by: INTERNAL MEDICINE

## 2021-06-23 PROCEDURE — 84443 ASSAY THYROID STIM HORMONE: CPT | Performed by: INTERNAL MEDICINE

## 2021-06-23 PROCEDURE — 83036 HEMOGLOBIN GLYCOSYLATED A1C: CPT | Performed by: INTERNAL MEDICINE

## 2021-06-23 PROCEDURE — 80053 COMPREHEN METABOLIC PANEL: CPT | Performed by: INTERNAL MEDICINE

## 2021-06-23 PROCEDURE — 99214 OFFICE O/P EST MOD 30 MIN: CPT | Performed by: INTERNAL MEDICINE

## 2021-06-23 PROCEDURE — 85025 COMPLETE CBC W/AUTO DIFF WBC: CPT | Performed by: INTERNAL MEDICINE

## 2021-06-23 RX ORDER — LANCETS
1 EACH MISCELLANEOUS 3 TIMES DAILY
Qty: 100 EACH | Refills: 2 | Status: SHIPPED | OUTPATIENT
Start: 2021-06-23 | End: 2023-01-03

## 2021-06-23 RX ORDER — OMEPRAZOLE 20 MG/1
20 CAPSULE, DELAYED RELEASE ORAL DAILY
COMMUNITY
End: 2021-09-03

## 2021-06-23 RX ORDER — BLOOD SUGAR DIAGNOSTIC
1 STRIP MISCELLANEOUS
COMMUNITY
End: 2021-06-23 | Stop reason: SDUPTHER

## 2021-06-23 RX ORDER — BLOOD SUGAR DIAGNOSTIC
1 STRIP MISCELLANEOUS 3 TIMES DAILY
Qty: 90 EACH | Refills: 1 | Status: SHIPPED | OUTPATIENT
Start: 2021-06-23 | End: 2023-02-13 | Stop reason: SDUPTHER

## 2021-06-23 RX ORDER — CHLORHEXIDINE GLUCONATE 0.12 MG/ML
15 RINSE ORAL 2 TIMES DAILY
Qty: 118 ML | Refills: 1 | Status: SHIPPED | OUTPATIENT
Start: 2021-06-23 | End: 2021-06-25 | Stop reason: SDUPTHER

## 2021-06-23 RX ORDER — LANCETS
EACH MISCELLANEOUS
COMMUNITY
End: 2021-06-23 | Stop reason: SDUPTHER

## 2021-06-23 NOTE — PROGRESS NOTES
"Chief Complaint  Diabetes; spot on left ankle; Med Refill; and throat issues, hard to swallow at times    Subjective          Dallas Hurt presents to [facility]  History of Present Illness    States that he isn't the best on checking his sugar  He usually checks it at night    Admits that he does eat a decent amount of sugar  Feels like he can tell when his sugars are off    Got his COVID vaccine    States that he has had some headaches, across his head  Did notice they worsened after his covid vaccine    At times he gets choked up while eating at times  He feels that the back of his throat is dry     Quit smoking in 2008, smoked 3ppd    Place on his ankle -  Isn't sure what he did to it. He says he felt a burning and rubbed the area and there was a white worm on it and the skin came off. He has been putting neosporin on it with a bandaid. It is healing well      Objective   Vital Signs:   /76   Pulse 76   Temp 97.7 °F (36.5 °C)   Ht 182.9 cm (72\")   Wt 117 kg (258 lb 6.4 oz)   SpO2 97%   BMI 35.05 kg/m²     Physical Exam  Vitals reviewed.   Constitutional:       Appearance: Normal appearance. He is well-developed. He is obese.   HENT:      Head: Normocephalic and atraumatic.      Right Ear: External ear normal.      Left Ear: External ear normal.      Mouth/Throat:      Pharynx: No oropharyngeal exudate.   Eyes:      Conjunctiva/sclera: Conjunctivae normal.      Pupils: Pupils are equal, round, and reactive to light.   Cardiovascular:      Rate and Rhythm: Normal rate and regular rhythm.      Heart sounds: No murmur heard.   No friction rub. No gallop.    Pulmonary:      Effort: Pulmonary effort is normal.      Breath sounds: Normal breath sounds. No wheezing or rhonchi.   Skin:     General: Skin is warm and dry.      Comments: Outer left ankle with 1cm ulcerated lesion   Neurological:      Mental Status: He is alert and oriented to person, place, and " time.      Cranial Nerves: No cranial nerve deficit.   Psychiatric:         Mood and Affect: Affect normal.         Behavior: Behavior normal.         Thought Content: Thought content normal.        Result Review :     Common labs    Common Labsle 11/11/20 11/11/20 2/1/21 2/1/21 2/1/21 2/1/21 2/1/21 6/23/21 6/23/21 6/23/21 6/23/21    1626 1626 2107 2107 2107 2107 2107 1506 1506 1506 1506   Glucose          147 (A)    Glucose  172 (A)    139 (A)        BUN  14    14    15    Creatinine  1.04    1.07    0.60 (A)    eGFR Non  Am          137    Sodium  135    138    136    Potassium  4.6    4.7    4.8    Chloride  99    101    101    Calcium  9.5    9.8    9.6    Albumin  4.4    4.5    4.60    Total Bilirubin  0.39    0.41    0.5    Alkaline Phosphatase  71    69    66    AST (SGOT)  29    28    24    ALT (SGPT)  43 (A)    47 (A)    38    WBC 8.82  9.37     9.20      Hemoglobin 17.1  17.5     18.4 (A)      Hematocrit 50.5  51.2     52.3 (A)      Platelets 220  216     225      Total Cholesterol           166   Total Cholesterol  167     152       Triglycerides  178 (A)     197 (A)    260 (A)   HDL Cholesterol  40     42    37 (A)   LDL Cholesterol   91     71    86   Hemoglobin A1C  9.7 (A)  8.5 (A)     9.04 (A)     PSA     0.31         (A) Abnormal value       Comments are available for some flowsheets but are not being displayed.              Procedures      Assessment and Plan    Diagnoses and all orders for this visit:    1. Memory change (Primary)  -     MRI Brain With & Without Contrast; Future    2. Type 2 diabetes mellitus with other circulatory complication, without long-term current use of insulin (CMS/[facility])  -     glucose blood (Accu-Chek Kaylee Plus) test strip; 1 each by Other route 3 (Three) Times a Day. Use as instructed two to three times daily  Dispense: 90 each; Refill: 1  -     Accu-Chek Softclix Lancets lancets; 1 each by Other route 3 (Three) Times a Day. Use as instructed two to three times  daily  Dispense: 100 each; Refill: 2  -     CBC & Differential  -     Comprehensive Metabolic Panel  -     Hemoglobin A1c  -     Lipid Panel  -     TSH    3. Encounter for screening for malignant neoplasm of lung in former smoker who quit in past 15 years with 30 pack year history or greater  -      CT Chest Low Dose Cancer Screening WO; Future    4. Dry mouth, unspecified  -     Discontinue: chlorhexidine (Peridex) 0.12 % solution; Apply 15 mL to the mouth or throat 2 (Two) Times a Day.  Dispense: 118 mL; Refill: 1    5. Skin lesion  -     silver sulfadiazine (Silvadene) 1 % cream; Apply  topically to the appropriate area as directed Daily.  Dispense: 85 g; Refill: 0    6. New daily persistent headache  -     MRI Brain With & Without Contrast; Future        Follow Up   Return in about 4 months (around 10/23/2021).  Patient was given instructions and counseling regarding his condition or for health maintenance advice. Please see specific information pulled into the AVS if appropriate.

## 2021-06-24 ENCOUNTER — TELEPHONE (OUTPATIENT)
Dept: INTERNAL MEDICINE | Facility: CLINIC | Age: 61
End: 2021-06-24

## 2021-06-24 DIAGNOSIS — R68.2 DRY MOUTH, UNSPECIFIED: ICD-10-CM

## 2021-06-24 LAB
ALBUMIN SERPL-MCNC: 4.6 G/DL (ref 3.5–5.2)
ALBUMIN/GLOB SERPL: 1.9 G/DL
ALP SERPL-CCNC: 66 U/L (ref 39–117)
ALT SERPL W P-5'-P-CCNC: 38 U/L (ref 1–41)
ANION GAP SERPL CALCULATED.3IONS-SCNC: 16.3 MMOL/L (ref 5–15)
AST SERPL-CCNC: 24 U/L (ref 1–40)
BASOPHILS # BLD AUTO: 0.09 10*3/MM3 (ref 0–0.2)
BASOPHILS NFR BLD AUTO: 1 % (ref 0–1.5)
BILIRUB SERPL-MCNC: 0.5 MG/DL (ref 0–1.2)
BUN SERPL-MCNC: 15 MG/DL (ref 8–23)
BUN/CREAT SERPL: 25 (ref 7–25)
CALCIUM SPEC-SCNC: 9.6 MG/DL (ref 8.6–10.5)
CHLORIDE SERPL-SCNC: 101 MMOL/L (ref 98–107)
CHOLEST SERPL-MCNC: 166 MG/DL (ref 0–200)
CO2 SERPL-SCNC: 18.7 MMOL/L (ref 22–29)
CREAT SERPL-MCNC: 0.6 MG/DL (ref 0.76–1.27)
DEPRECATED RDW RBC AUTO: 38.4 FL (ref 37–54)
EOSINOPHIL # BLD AUTO: 0.12 10*3/MM3 (ref 0–0.4)
EOSINOPHIL NFR BLD AUTO: 1.3 % (ref 0.3–6.2)
ERYTHROCYTE [DISTWIDTH] IN BLOOD BY AUTOMATED COUNT: 12.4 % (ref 12.3–15.4)
GFR SERPL CREATININE-BSD FRML MDRD: 137 ML/MIN/1.73
GLOBULIN UR ELPH-MCNC: 2.4 GM/DL
GLUCOSE SERPL-MCNC: 147 MG/DL (ref 65–99)
HBA1C MFR BLD: 9.04 % (ref 4.8–5.6)
HCT VFR BLD AUTO: 52.3 % (ref 37.5–51)
HDLC SERPL-MCNC: 37 MG/DL (ref 40–60)
HGB BLD-MCNC: 18.4 G/DL (ref 13–17.7)
IMM GRANULOCYTES # BLD AUTO: 0.06 10*3/MM3 (ref 0–0.05)
IMM GRANULOCYTES NFR BLD AUTO: 0.7 % (ref 0–0.5)
LDLC SERPL CALC-MCNC: 86 MG/DL (ref 0–100)
LDLC/HDLC SERPL: 2.08 {RATIO}
LYMPHOCYTES # BLD AUTO: 2.46 10*3/MM3 (ref 0.7–3.1)
LYMPHOCYTES NFR BLD AUTO: 26.7 % (ref 19.6–45.3)
MCH RBC QN AUTO: 30.2 PG (ref 26.6–33)
MCHC RBC AUTO-ENTMCNC: 35.2 G/DL (ref 31.5–35.7)
MCV RBC AUTO: 85.9 FL (ref 79–97)
MONOCYTES # BLD AUTO: 0.76 10*3/MM3 (ref 0.1–0.9)
MONOCYTES NFR BLD AUTO: 8.3 % (ref 5–12)
NEUTROPHILS NFR BLD AUTO: 5.71 10*3/MM3 (ref 1.7–7)
NEUTROPHILS NFR BLD AUTO: 62 % (ref 42.7–76)
NRBC BLD AUTO-RTO: 0.1 /100 WBC (ref 0–0.2)
PLATELET # BLD AUTO: 225 10*3/MM3 (ref 140–450)
PMV BLD AUTO: 11.3 FL (ref 6–12)
POTASSIUM SERPL-SCNC: 4.8 MMOL/L (ref 3.5–5.2)
PROT SERPL-MCNC: 7 G/DL (ref 6–8.5)
RBC # BLD AUTO: 6.09 10*6/MM3 (ref 4.14–5.8)
SODIUM SERPL-SCNC: 136 MMOL/L (ref 136–145)
TRIGL SERPL-MCNC: 260 MG/DL (ref 0–150)
TSH SERPL DL<=0.05 MIU/L-ACNC: 1.45 UIU/ML (ref 0.27–4.2)
VLDLC SERPL-MCNC: 43 MG/DL (ref 5–40)
WBC # BLD AUTO: 9.2 10*3/MM3 (ref 3.4–10.8)

## 2021-06-24 RX ORDER — DEXLANSOPRAZOLE 60 MG/1
60 CAPSULE, DELAYED RELEASE ORAL DAILY
COMMUNITY
Start: 2021-06-07 | End: 2022-06-29 | Stop reason: SDUPTHER

## 2021-06-24 RX ORDER — KETOCONAZOLE 20 MG/G
1 CREAM TOPICAL DAILY
COMMUNITY
Start: 2021-05-15 | End: 2022-04-04

## 2021-06-24 RX ORDER — TADALAFIL 10 MG/1
10 TABLET ORAL AS NEEDED
COMMUNITY
Start: 2021-03-22 | End: 2021-06-25 | Stop reason: SDUPTHER

## 2021-06-24 NOTE — TELEPHONE ENCOUNTER
Caller: [facility] [place], [place] - [address] - [phone] [facility] [phone]     Relationship: Pharmacy    Best call back number: [phone]    What medications are you currently taking:   PERIDEX (MOUTH WASH)    Who prescribed you this medication: PATSY VAN     What are your concerns: PRESCRIPTION STATES FOR PATIENT TO APPLY TO MOUTH AND THROAT. PHARMACY STATED WOULD LIKE TO SWITCH TO SWISH AND SPIT.     PHARMACY CAN CHANGE IF NEW ORDER FOR PRESCRIPTION CAN BE SENT.

## 2021-06-25 ENCOUNTER — TELEPHONE (OUTPATIENT)
Dept: INTERNAL MEDICINE | Facility: CLINIC | Age: 61
End: 2021-06-25

## 2021-06-25 RX ORDER — TADALAFIL 10 MG/1
TABLET ORAL
Qty: 90 TABLET | Refills: 0 | Status: SHIPPED | OUTPATIENT
Start: 2021-06-25 | End: 2021-09-03

## 2021-06-25 RX ORDER — CHLORHEXIDINE GLUCONATE 0.12 MG/ML
15 RINSE ORAL 2 TIMES DAILY
Qty: 118 ML | Refills: 1 | OUTPATIENT
Start: 2021-06-25 | End: 2022-11-10

## 2021-06-25 NOTE — TELEPHONE ENCOUNTER
Pharmacy Name: [facility] [id number]  [place], [place] - [address] [facility] [phone] [facility] [phone]      Pharmacy representative name: EBONIE     Pharmacy representative phone number: [phone]    What medication are you calling in regards to:   chlorhexidine (Peridex) 0.12 % solution    What question does the pharmacy have: PHARMACY NEEDS CLARIFICATION ON DIRECTIONS. PLEASE CALL AND ADVISE.

## 2021-07-01 RX ORDER — METFORMIN HYDROCHLORIDE EXTENDED-RELEASE TABLETS 1000 MG/1
1000 TABLET, FILM COATED, EXTENDED RELEASE ORAL
Qty: 90 TABLET | Refills: 1 | Status: SHIPPED | OUTPATIENT
Start: 2021-07-01 | End: 2021-07-14 | Stop reason: CLARIF

## 2021-07-02 ENCOUNTER — PRIOR AUTHORIZATION (OUTPATIENT)
Dept: INTERNAL MEDICINE | Facility: CLINIC | Age: 61
End: 2021-07-02

## 2021-07-06 NOTE — TELEPHONE ENCOUNTER
Pt pharmacy called stating that Fortamet is not covered by insurance and wants an alternative medication sent in.

## 2021-07-07 NOTE — TELEPHONE ENCOUNTER
I believe this is just Metformin we can switch to any other preparation of Metformin doing know what his insurance will cover?  Could just send in generic Metformin?

## 2021-07-07 NOTE — TELEPHONE ENCOUNTER
"It is because it is fortamet insurances don't want to cover the \"osmotic\" formulary. She said insurance will cover Metformin extended release. Wasn't sure if you wanted 500mg xr bid or take 2 tabs. Please advise and send in medication to pharmacy. Thank you  "

## 2021-07-12 ENCOUNTER — APPOINTMENT (OUTPATIENT)
Dept: CT IMAGING | Facility: HOSPITAL | Age: 61
End: 2021-07-12

## 2021-07-14 RX ORDER — METFORMIN HYDROCHLORIDE 500 MG/1
500 TABLET, EXTENDED RELEASE ORAL 2 TIMES DAILY WITH MEALS
Qty: 180 TABLET | Refills: 0 | Status: SHIPPED | OUTPATIENT
Start: 2021-07-14 | End: 2021-09-03

## 2021-07-23 ENCOUNTER — TELEPHONE (OUTPATIENT)
Dept: INTERNAL MEDICINE | Facility: CLINIC | Age: 61
End: 2021-07-23

## 2021-07-26 NOTE — TELEPHONE ENCOUNTER
Discussed with Caryl Moreno, sending in drops patient to present top the office if symptoms do not improve.

## 2021-07-26 NOTE — TELEPHONE ENCOUNTER
Patient called stating that he has had a red and puffy eye for the last two days, right eye is affected. Lid is swollen and tender with yellow drainage coming from the eye. Reports eye is red and painful. Will Discuss with Dr. Moreno if sending in eye drops are appropriate.

## 2021-07-27 RX ORDER — POLYMYXIN B SULFATE AND TRIMETHOPRIM 1; 10000 MG/ML; [USP'U]/ML
1 SOLUTION OPHTHALMIC EVERY 4 HOURS
Qty: 10 ML | Refills: 0 | Status: SHIPPED | OUTPATIENT
Start: 2021-07-27 | End: 2021-08-03

## 2021-07-28 ENCOUNTER — HOSPITAL ENCOUNTER (OUTPATIENT)
Dept: CT IMAGING | Facility: HOSPITAL | Age: 61
Discharge: HOME | End: 2021-07-28
Admitting: INTERNAL MEDICINE

## 2021-07-28 DIAGNOSIS — Z12.2 ENCOUNTER FOR SCREENING FOR MALIGNANT NEOPLASM OF LUNG IN FORMER SMOKER WHO QUIT IN PAST 15 YEARS WITH 30 PACK YEAR HISTORY OR GREATER: ICD-10-CM

## 2021-07-28 DIAGNOSIS — Z87.891 ENCOUNTER FOR SCREENING FOR MALIGNANT NEOPLASM OF LUNG IN FORMER SMOKER WHO QUIT IN PAST 15 YEARS WITH 30 PACK YEAR HISTORY OR GREATER: ICD-10-CM

## 2021-07-28 PROCEDURE — 71271 CT THORAX LUNG CANCER SCR C-: CPT

## 2021-07-30 ENCOUNTER — TELEPHONE (OUTPATIENT)
Dept: INTERNAL MEDICINE | Facility: CLINIC | Age: 61
End: 2021-07-30

## 2021-07-30 NOTE — TELEPHONE ENCOUNTER
Hub staff attempted to follow warm transfer process and was unsuccessful     Caller: Dallas Hurt    Relationship to patient: Self    Best call back number:   [phone]    Patient is needing: PATIENT HAS RETURNED PHONE CALL TO OFFICE IN REGARDS TO RESULTS, PLEASE CALL AND ADVISE. THANK YOU.

## 2021-09-03 RX ORDER — METFORMIN HYDROCHLORIDE 500 MG/1
TABLET, EXTENDED RELEASE ORAL
Qty: 180 TABLET | Refills: 0 | Status: SHIPPED | OUTPATIENT
Start: 2021-09-03 | End: 2021-10-12

## 2021-09-03 RX ORDER — TADALAFIL 10 MG/1
TABLET ORAL
Qty: 90 TABLET | Refills: 0 | Status: SHIPPED | OUTPATIENT
Start: 2021-09-03 | End: 2022-01-05

## 2021-09-03 RX ORDER — DULAGLUTIDE 3 MG/.5ML
INJECTION, SOLUTION SUBCUTANEOUS
Qty: 12 ML | Refills: 0 | Status: SHIPPED | OUTPATIENT
Start: 2021-09-03 | End: 2021-09-14

## 2021-09-03 RX ORDER — OMEPRAZOLE 20 MG/1
CAPSULE, DELAYED RELEASE ORAL
Qty: 90 CAPSULE | Refills: 0 | Status: SHIPPED | OUTPATIENT
Start: 2021-09-03 | End: 2021-12-09

## 2021-09-03 RX ORDER — MONTELUKAST SODIUM 10 MG/1
TABLET ORAL
Qty: 90 TABLET | Refills: 0 | Status: SHIPPED | OUTPATIENT
Start: 2021-09-03 | End: 2021-12-09

## 2021-09-14 RX ORDER — DULAGLUTIDE 3 MG/.5ML
INJECTION, SOLUTION SUBCUTANEOUS
Qty: 12 ML | Refills: 0 | Status: SHIPPED | OUTPATIENT
Start: 2021-09-14 | End: 2021-12-06 | Stop reason: SDUPTHER

## 2021-10-12 RX ORDER — METFORMIN HYDROCHLORIDE 500 MG/1
TABLET, EXTENDED RELEASE ORAL
Qty: 180 TABLET | Refills: 0 | Status: SHIPPED | OUTPATIENT
Start: 2021-10-12 | End: 2022-04-04

## 2021-10-25 ENCOUNTER — OFFICE VISIT (OUTPATIENT)
Dept: INTERNAL MEDICINE | Facility: CLINIC | Age: 61
End: 2021-10-25

## 2021-10-25 VITALS
HEIGHT: 72 IN | BODY MASS INDEX: 35.49 KG/M2 | TEMPERATURE: 97.2 F | RESPIRATION RATE: 14 BRPM | WEIGHT: 262 LBS | OXYGEN SATURATION: 95 % | SYSTOLIC BLOOD PRESSURE: 114 MMHG | DIASTOLIC BLOOD PRESSURE: 64 MMHG | HEART RATE: 86 BPM

## 2021-10-25 DIAGNOSIS — Z23 NEED FOR INFLUENZA VACCINATION: ICD-10-CM

## 2021-10-25 DIAGNOSIS — L98.9 SKIN LESION: ICD-10-CM

## 2021-10-25 DIAGNOSIS — E11.59 TYPE 2 DIABETES MELLITUS WITH OTHER CIRCULATORY COMPLICATION, WITHOUT LONG-TERM CURRENT USE OF INSULIN (HCC): Primary | ICD-10-CM

## 2021-10-25 DIAGNOSIS — N52.9 ERECTILE DYSFUNCTION, UNSPECIFIED ERECTILE DYSFUNCTION TYPE: ICD-10-CM

## 2021-10-25 DIAGNOSIS — L57.8 SUN-DAMAGED SKIN: ICD-10-CM

## 2021-10-25 DIAGNOSIS — Z11.59 NEED FOR HEPATITIS C SCREENING TEST: ICD-10-CM

## 2021-10-25 DIAGNOSIS — L57.0 ACTINIC KERATOSIS: ICD-10-CM

## 2021-10-25 LAB
ALBUMIN SERPL-MCNC: 4.5 G/DL (ref 3.5–5.2)
ALBUMIN/GLOB SERPL: 1.7 G/DL
ALP SERPL-CCNC: 73 U/L (ref 39–117)
ALT SERPL W P-5'-P-CCNC: 42 U/L (ref 1–41)
ANION GAP SERPL CALCULATED.3IONS-SCNC: 11.1 MMOL/L (ref 5–15)
AST SERPL-CCNC: 23 U/L (ref 1–40)
BASOPHILS # BLD AUTO: 0.07 10*3/MM3 (ref 0–0.2)
BASOPHILS NFR BLD AUTO: 1 % (ref 0–1.5)
BILIRUB SERPL-MCNC: 0.5 MG/DL (ref 0–1.2)
BUN SERPL-MCNC: 11 MG/DL (ref 8–23)
BUN/CREAT SERPL: 13.9 (ref 7–25)
CALCIUM SPEC-SCNC: 9.8 MG/DL (ref 8.6–10.5)
CHLORIDE SERPL-SCNC: 99 MMOL/L (ref 98–107)
CHOLEST SERPL-MCNC: 174 MG/DL (ref 0–200)
CO2 SERPL-SCNC: 22.9 MMOL/L (ref 22–29)
CREAT SERPL-MCNC: 0.79 MG/DL (ref 0.76–1.27)
DEPRECATED RDW RBC AUTO: 40.4 FL (ref 37–54)
EOSINOPHIL # BLD AUTO: 0.11 10*3/MM3 (ref 0–0.4)
EOSINOPHIL NFR BLD AUTO: 1.5 % (ref 0.3–6.2)
ERYTHROCYTE [DISTWIDTH] IN BLOOD BY AUTOMATED COUNT: 12.5 % (ref 12.3–15.4)
GFR SERPL CREATININE-BSD FRML MDRD: 100 ML/MIN/1.73
GLOBULIN UR ELPH-MCNC: 2.7 GM/DL
GLUCOSE SERPL-MCNC: 203 MG/DL (ref 65–99)
HBA1C MFR BLD: 10.8 % (ref 4.8–5.6)
HCT VFR BLD AUTO: 50.6 % (ref 37.5–51)
HCV AB SER DONR QL: NORMAL
HDLC SERPL-MCNC: 40 MG/DL (ref 40–60)
HGB BLD-MCNC: 16.8 G/DL (ref 13–17.7)
IMM GRANULOCYTES # BLD AUTO: 0.03 10*3/MM3 (ref 0–0.05)
IMM GRANULOCYTES NFR BLD AUTO: 0.4 % (ref 0–0.5)
LDLC SERPL CALC-MCNC: 105 MG/DL (ref 0–100)
LDLC/HDLC SERPL: 2.51 {RATIO}
LYMPHOCYTES # BLD AUTO: 2.34 10*3/MM3 (ref 0.7–3.1)
LYMPHOCYTES NFR BLD AUTO: 32.4 % (ref 19.6–45.3)
MCH RBC QN AUTO: 29.2 PG (ref 26.6–33)
MCHC RBC AUTO-ENTMCNC: 33.2 G/DL (ref 31.5–35.7)
MCV RBC AUTO: 87.8 FL (ref 79–97)
MONOCYTES # BLD AUTO: 0.57 10*3/MM3 (ref 0.1–0.9)
MONOCYTES NFR BLD AUTO: 7.9 % (ref 5–12)
NEUTROPHILS NFR BLD AUTO: 4.1 10*3/MM3 (ref 1.7–7)
NEUTROPHILS NFR BLD AUTO: 56.8 % (ref 42.7–76)
NRBC BLD AUTO-RTO: 0 /100 WBC (ref 0–0.2)
PLATELET # BLD AUTO: 213 10*3/MM3 (ref 140–450)
PMV BLD AUTO: 10.8 FL (ref 6–12)
POTASSIUM SERPL-SCNC: 4.3 MMOL/L (ref 3.5–5.2)
PROT SERPL-MCNC: 7.2 G/DL (ref 6–8.5)
RBC # BLD AUTO: 5.76 10*6/MM3 (ref 4.14–5.8)
SODIUM SERPL-SCNC: 133 MMOL/L (ref 136–145)
TRIGL SERPL-MCNC: 168 MG/DL (ref 0–150)
TSH SERPL DL<=0.05 MIU/L-ACNC: 1.26 UIU/ML (ref 0.27–4.2)
VLDLC SERPL-MCNC: 29 MG/DL (ref 5–40)
WBC # BLD AUTO: 7.22 10*3/MM3 (ref 3.4–10.8)

## 2021-10-25 PROCEDURE — 80053 COMPREHEN METABOLIC PANEL: CPT | Performed by: INTERNAL MEDICINE

## 2021-10-25 PROCEDURE — 99214 OFFICE O/P EST MOD 30 MIN: CPT | Performed by: INTERNAL MEDICINE

## 2021-10-25 PROCEDURE — 85025 COMPLETE CBC W/AUTO DIFF WBC: CPT | Performed by: INTERNAL MEDICINE

## 2021-10-25 PROCEDURE — 84443 ASSAY THYROID STIM HORMONE: CPT | Performed by: INTERNAL MEDICINE

## 2021-10-25 PROCEDURE — 83036 HEMOGLOBIN GLYCOSYLATED A1C: CPT | Performed by: INTERNAL MEDICINE

## 2021-10-25 PROCEDURE — 86803 HEPATITIS C AB TEST: CPT | Performed by: INTERNAL MEDICINE

## 2021-10-25 PROCEDURE — 90471 IMMUNIZATION ADMIN: CPT | Performed by: INTERNAL MEDICINE

## 2021-10-25 PROCEDURE — 80061 LIPID PANEL: CPT | Performed by: INTERNAL MEDICINE

## 2021-10-25 PROCEDURE — 90686 IIV4 VACC NO PRSV 0.5 ML IM: CPT | Performed by: INTERNAL MEDICINE

## 2021-10-25 NOTE — PROGRESS NOTES
"Chief Complaint  Follow-up and Diabetes    Subjective          Dallas Hurt presents to [facility]  History of Present Illness    Admits that he does not do the best job with his diet  Has been having some stomach issues from time to time  This has been happening for about 3 months  He has a cramping pain and the diarrhea type pain  This may be worsening since he takes his Trulicity especially at the higher dose  Does not really check his sugars on a regular basis  However does feel like he notices when his sugar is high    Spot on head and arm  A spot on his arms on the left posterior arm he feels that he may have been bitten and it is just not healing super well  The spot on his head is flaky it will peel off but comes right back    No chest pain  No trouble breathing unless he is walking gets winded    Continues to have erectile issues the Cialis has helped a little bit but he still has trouble with ejaculation he is able to achieve erection it just does not last long he is wondering what his other options might be    Objective   Vital Signs:   /64   Pulse 86   Temp 97.2 °F (36.2 °C)   Resp 14   Ht 182.9 cm (72\")   Wt 119 kg (262 lb)   SpO2 95%   BMI 35.53 kg/m²     Physical Exam  Vitals reviewed.   Constitutional:       Appearance: Normal appearance. He is well-developed. He is obese.   HENT:      Head: Normocephalic and atraumatic.      Right Ear: External ear normal.      Left Ear: External ear normal.   Eyes:      Conjunctiva/sclera: Conjunctivae normal.      Pupils: Pupils are equal, round, and reactive to light.   Cardiovascular:      Rate and Rhythm: Normal rate and regular rhythm.      Heart sounds: No murmur heard.  No friction rub. No gallop.    Pulmonary:      Effort: Pulmonary effort is normal.      Breath sounds: Normal breath sounds. No wheezing or rhonchi.   Skin:     General: Skin is warm and dry.      Comments: Does have an excoriated " area on his left posterior arm as well as raised yellowish/skin colored flaky area on his scalp   Neurological:      Mental Status: He is alert and oriented to person, place, and time.      Cranial Nerves: No cranial nerve deficit.   Psychiatric:         Mood and Affect: Affect normal.         Behavior: Behavior normal.         Thought Content: Thought content normal.        Result Review :     Common labs    Common Labsle 2/1/21 2/1/21 2/1/21 2/1/21 2/1/21 6/23/21 6/23/21 6/23/21 6/23/21 10/25/21 10/25/21 10/25/21 10/25/21    2107 2107 2107 2107 2107 1506 1506 1506 1506 1557 1557 1557 1557   Glucose        147 (A)     203 (A)   Glucose    139 (A)            BUN    14    15     11   Creatinine    1.07    0.60 (A)     0.79   eGFR Non African Am        137     100   Sodium    138    136     133 (A)   Potassium    4.7    4.8     4.3   Chloride    101    101     99   Calcium    9.8    9.6     9.8   Albumin    4.5    4.60     4.50   Total Bilirubin    0.41    0.5     0.5   Alkaline Phosphatase    69    66     73   AST (SGOT)    28    24     23   ALT (SGPT)    47 (A)    38     42 (A)   WBC 9.37     9.20    7.22      Hemoglobin 17.5     18.4 (A)    16.8      Hematocrit 51.2     52.3 (A)    50.6      Platelets 216     225    213      Total Cholesterol         166  174     Total Cholesterol     152           Triglycerides     197 (A)    260 (A)  168 (A)     HDL Cholesterol     42    37 (A)  40     LDL Cholesterol      71    86  105 (A)     Hemoglobin A1C  8.5 (A)     9.04 (A)     10.80 (A)    PSA   0.31             (A) Abnormal value       Comments are available for some flowsheets but are not being displayed.              Procedures      Assessment and Plan    Diagnoses and all orders for this visit:    1. Type 2 diabetes mellitus with other circulatory complication, without long-term current use of insulin (HCC) (Primary)  Comments:  I had a long discussion about what we may need to do with medicines depending on what his  labs look like  Orders:  -     CBC & Differential  -     Comprehensive Metabolic Panel  -     Lipid Panel  -     TSH  -     Hemoglobin A1c    2. Need for influenza vaccination  -     FluLaval/Fluarix/Fluzone >6 Months    3. Need for hepatitis C screening test  -     Hepatitis C Antibody; Future  -     Hepatitis C Antibody    4. Sun-damaged skin  Comments:  Will refer to Derm as he may be a candidate for a peel on his head  Orders:  -     Cancel: Ambulatory Referral to Dermatology  -     Ambulatory Referral to Dermatology    5. Actinic keratosis  Comments:  Area on scalp needs to be frozen however as we are sending to dermatology and he has other lesions he will just wait and have this done there  Orders:  -     Cancel: Ambulatory Referral to Dermatology  -     Ambulatory Referral to Dermatology    6. Skin lesion  Comments:  Will treat spot on her arm with mupirocin  Orders:  -     Ambulatory Referral to Dermatology    7. Erectile dysfunction, unspecified erectile dysfunction type  Comments:  Will refer to urology for further work-up  Orders:  -     Ambulatory Referral to Urology    Other orders  -     mupirocin (Bactroban) 2 % ointment; Apply 1 application topically to the appropriate area as directed 3 (Three) Times a Day.  Dispense: 1 g; Refill: 1              Follow Up   Return in about 6 weeks (around 12/6/2021).  Patient was given instructions and counseling regarding his condition or for health maintenance advice. Please see specific information pulled into the AVS if appropriate.

## 2021-11-09 RX ORDER — BLOOD-GLUCOSE METER
1 KIT MISCELLANEOUS 2 TIMES DAILY PRN
Qty: 1 EACH | Refills: 0 | Status: SHIPPED | OUTPATIENT
Start: 2021-11-09

## 2021-12-06 RX ORDER — DULAGLUTIDE 3 MG/.5ML
3 INJECTION, SOLUTION SUBCUTANEOUS
Qty: 12 ML | Refills: 1 | Status: SHIPPED | OUTPATIENT
Start: 2021-12-06 | End: 2022-07-01 | Stop reason: ALTCHOICE

## 2021-12-06 NOTE — TELEPHONE ENCOUNTER
Caller: Dallas Hurt    Relationship: Self     Best call back number: [phone]    Requested Prescriptions:   Requested Prescriptions     Pending Prescriptions Disp Refills   • Dulaglutide (Trulicity) 3 MG/0.5ML solution pen-injector 12 mL 0        Pharmacy where request should be sent: [facility] [address] [place] [address] [address] [facility] [phone] [facility] [phone]      Additional details provided by patient:  PATIENT SUGAR HAS BEEN AT AN AVERAGE  THE LAST TWO WEEKS AND HE NEEDS SUGAR MEDICINE CALLED IN BUT DOESN'T WANT TO BE ON INSULIN. PLEASE ADVISE WHAT YOU WOULD LIKE HIM TO DO OR LET HIM KNOW WHAT CAN BE CALLED IN    Does the patient have less than a 3 day supply:  [x] Yes  [] No    Melba Hannah Rep   12/06/21 12:16 EST

## 2021-12-07 NOTE — TELEPHONE ENCOUNTER
Red rule verified and correct.    Spoke to pt.   [facility] has the Rx however they are waiting on a shipment.  They should get by this weekend.

## 2021-12-08 DIAGNOSIS — L98.9 SKIN LESION: ICD-10-CM

## 2021-12-09 RX ORDER — MONTELUKAST SODIUM 10 MG/1
TABLET ORAL
Qty: 90 TABLET | Refills: 0 | Status: SHIPPED | OUTPATIENT
Start: 2021-12-09 | End: 2022-02-14

## 2021-12-09 RX ORDER — OMEPRAZOLE 20 MG/1
CAPSULE, DELAYED RELEASE ORAL
Qty: 90 CAPSULE | Refills: 0 | Status: SHIPPED | OUTPATIENT
Start: 2021-12-09 | End: 2022-02-14

## 2021-12-09 RX ORDER — ATORVASTATIN CALCIUM 20 MG/1
TABLET, FILM COATED ORAL
Qty: 90 TABLET | Refills: 0 | Status: SHIPPED | OUTPATIENT
Start: 2021-12-09 | End: 2022-04-04

## 2021-12-09 RX ORDER — SILVER SULFADIAZINE 1 %
CREAM (GRAM) TOPICAL
Qty: 85 G | Refills: 0 | Status: SHIPPED | OUTPATIENT
Start: 2021-12-09 | End: 2022-04-05

## 2021-12-14 ENCOUNTER — OFFICE VISIT (OUTPATIENT)
Dept: INTERNAL MEDICINE | Facility: CLINIC | Age: 61
End: 2021-12-14

## 2021-12-14 VITALS
TEMPERATURE: 98 F | SYSTOLIC BLOOD PRESSURE: 121 MMHG | DIASTOLIC BLOOD PRESSURE: 71 MMHG | HEIGHT: 72 IN | BODY MASS INDEX: 36.03 KG/M2 | HEART RATE: 98 BPM | RESPIRATION RATE: 18 BRPM | WEIGHT: 266 LBS | OXYGEN SATURATION: 98 %

## 2021-12-14 DIAGNOSIS — E11.59 TYPE 2 DIABETES MELLITUS WITH OTHER CIRCULATORY COMPLICATION, WITHOUT LONG-TERM CURRENT USE OF INSULIN (HCC): Primary | ICD-10-CM

## 2021-12-14 DIAGNOSIS — J01.91 ACUTE RECURRENT SINUSITIS, UNSPECIFIED LOCATION: ICD-10-CM

## 2021-12-14 LAB
ALBUMIN SERPL-MCNC: 4.3 G/DL (ref 3.5–5.2)
ALBUMIN UR-MCNC: 1.7 MG/DL
ALBUMIN/GLOB SERPL: 1.7 G/DL
ALP SERPL-CCNC: 73 U/L (ref 39–117)
ALT SERPL W P-5'-P-CCNC: 37 U/L (ref 1–41)
ANION GAP SERPL CALCULATED.3IONS-SCNC: 12.1 MMOL/L (ref 5–15)
AST SERPL-CCNC: 23 U/L (ref 1–40)
BASOPHILS # BLD AUTO: 0.08 10*3/MM3 (ref 0–0.2)
BASOPHILS NFR BLD AUTO: 1.1 % (ref 0–1.5)
BILIRUB SERPL-MCNC: 0.4 MG/DL (ref 0–1.2)
BUN SERPL-MCNC: 11 MG/DL (ref 8–23)
BUN/CREAT SERPL: 11.2 (ref 7–25)
CALCIUM SPEC-SCNC: 9.9 MG/DL (ref 8.6–10.5)
CHLORIDE SERPL-SCNC: 98 MMOL/L (ref 98–107)
CHOLEST SERPL-MCNC: 156 MG/DL (ref 0–200)
CO2 SERPL-SCNC: 23.9 MMOL/L (ref 22–29)
CREAT SERPL-MCNC: 0.98 MG/DL (ref 0.76–1.27)
DEPRECATED RDW RBC AUTO: 37.9 FL (ref 37–54)
EOSINOPHIL # BLD AUTO: 0.19 10*3/MM3 (ref 0–0.4)
EOSINOPHIL NFR BLD AUTO: 2.6 % (ref 0.3–6.2)
ERYTHROCYTE [DISTWIDTH] IN BLOOD BY AUTOMATED COUNT: 12 % (ref 12.3–15.4)
GFR SERPL CREATININE-BSD FRML MDRD: 78 ML/MIN/1.73
GLOBULIN UR ELPH-MCNC: 2.6 GM/DL
GLUCOSE SERPL-MCNC: 288 MG/DL (ref 65–99)
HBA1C MFR BLD: 10.7 % (ref 4.8–5.6)
HCT VFR BLD AUTO: 48.8 % (ref 37.5–51)
HDLC SERPL-MCNC: 34 MG/DL (ref 40–60)
HGB BLD-MCNC: 16.4 G/DL (ref 13–17.7)
IMM GRANULOCYTES # BLD AUTO: 0.03 10*3/MM3 (ref 0–0.05)
IMM GRANULOCYTES NFR BLD AUTO: 0.4 % (ref 0–0.5)
LDLC SERPL CALC-MCNC: 82 MG/DL (ref 0–100)
LDLC/HDLC SERPL: 2.18 {RATIO}
LYMPHOCYTES # BLD AUTO: 2.11 10*3/MM3 (ref 0.7–3.1)
LYMPHOCYTES NFR BLD AUTO: 28.8 % (ref 19.6–45.3)
MCH RBC QN AUTO: 29.2 PG (ref 26.6–33)
MCHC RBC AUTO-ENTMCNC: 33.6 G/DL (ref 31.5–35.7)
MCV RBC AUTO: 86.8 FL (ref 79–97)
MONOCYTES # BLD AUTO: 0.69 10*3/MM3 (ref 0.1–0.9)
MONOCYTES NFR BLD AUTO: 9.4 % (ref 5–12)
NEUTROPHILS NFR BLD AUTO: 4.23 10*3/MM3 (ref 1.7–7)
NEUTROPHILS NFR BLD AUTO: 57.7 % (ref 42.7–76)
NRBC BLD AUTO-RTO: 0 /100 WBC (ref 0–0.2)
PLATELET # BLD AUTO: 209 10*3/MM3 (ref 140–450)
PMV BLD AUTO: 11.1 FL (ref 6–12)
POTASSIUM SERPL-SCNC: 4.6 MMOL/L (ref 3.5–5.2)
PROT SERPL-MCNC: 6.9 G/DL (ref 6–8.5)
RBC # BLD AUTO: 5.62 10*6/MM3 (ref 4.14–5.8)
SODIUM SERPL-SCNC: 134 MMOL/L (ref 136–145)
TRIGL SERPL-MCNC: 239 MG/DL (ref 0–150)
TSH SERPL DL<=0.05 MIU/L-ACNC: 2.01 UIU/ML (ref 0.27–4.2)
VLDLC SERPL-MCNC: 40 MG/DL (ref 5–40)
WBC NRBC COR # BLD: 7.33 10*3/MM3 (ref 3.4–10.8)

## 2021-12-14 PROCEDURE — 85025 COMPLETE CBC W/AUTO DIFF WBC: CPT | Performed by: INTERNAL MEDICINE

## 2021-12-14 PROCEDURE — 83036 HEMOGLOBIN GLYCOSYLATED A1C: CPT | Performed by: INTERNAL MEDICINE

## 2021-12-14 PROCEDURE — 96372 THER/PROPH/DIAG INJ SC/IM: CPT | Performed by: INTERNAL MEDICINE

## 2021-12-14 PROCEDURE — 82043 UR ALBUMIN QUANTITATIVE: CPT | Performed by: INTERNAL MEDICINE

## 2021-12-14 PROCEDURE — 84443 ASSAY THYROID STIM HORMONE: CPT | Performed by: INTERNAL MEDICINE

## 2021-12-14 PROCEDURE — 80061 LIPID PANEL: CPT | Performed by: INTERNAL MEDICINE

## 2021-12-14 PROCEDURE — 80053 COMPREHEN METABOLIC PANEL: CPT | Performed by: INTERNAL MEDICINE

## 2021-12-14 PROCEDURE — 99214 OFFICE O/P EST MOD 30 MIN: CPT | Performed by: INTERNAL MEDICINE

## 2021-12-14 RX ORDER — BETAMETHASONE DIPROPIONATE 0.5 MG/G
0.5 LOTION TOPICAL AS NEEDED
COMMUNITY
Start: 2021-12-09 | End: 2022-11-10

## 2021-12-16 PROBLEM — N52.9 ERECTILE DYSFUNCTION: Status: ACTIVE | Noted: 2021-12-16

## 2022-01-05 RX ORDER — TADALAFIL 10 MG/1
TABLET ORAL
Qty: 90 TABLET | Refills: 0 | Status: SHIPPED | OUTPATIENT
Start: 2022-01-05 | End: 2022-06-22

## 2022-02-02 ENCOUNTER — TELEPHONE (OUTPATIENT)
Dept: INTERNAL MEDICINE | Facility: CLINIC | Age: 62
End: 2022-02-02

## 2022-02-02 ENCOUNTER — OFFICE VISIT (OUTPATIENT)
Dept: INTERNAL MEDICINE | Facility: CLINIC | Age: 62
End: 2022-02-02

## 2022-02-02 VITALS
SYSTOLIC BLOOD PRESSURE: 122 MMHG | HEART RATE: 82 BPM | HEIGHT: 72 IN | WEIGHT: 264 LBS | OXYGEN SATURATION: 98 % | BODY MASS INDEX: 35.76 KG/M2 | TEMPERATURE: 98.2 F | DIASTOLIC BLOOD PRESSURE: 72 MMHG

## 2022-02-02 DIAGNOSIS — J02.9 SORE THROAT: ICD-10-CM

## 2022-02-02 DIAGNOSIS — J06.9 VIRAL URI WITH COUGH: Primary | ICD-10-CM

## 2022-02-02 LAB
EXPIRATION DATE: NORMAL
FLUAV AG NPH QL: NEGATIVE
FLUBV AG NPH QL: NEGATIVE
INTERNAL CONTROL: NORMAL
Lab: NORMAL
S PYO AG THROAT QL: NEGATIVE
SARS-COV-2 AG UPPER RESP QL IA.RAPID: NOT DETECTED

## 2022-02-02 PROCEDURE — 87804 INFLUENZA ASSAY W/OPTIC: CPT | Performed by: NURSE PRACTITIONER

## 2022-02-02 PROCEDURE — 99213 OFFICE O/P EST LOW 20 MIN: CPT | Performed by: NURSE PRACTITIONER

## 2022-02-02 PROCEDURE — 87880 STREP A ASSAY W/OPTIC: CPT | Performed by: NURSE PRACTITIONER

## 2022-02-02 PROCEDURE — 87081 CULTURE SCREEN ONLY: CPT | Performed by: NURSE PRACTITIONER

## 2022-02-02 PROCEDURE — 87426 SARSCOV CORONAVIRUS AG IA: CPT | Performed by: NURSE PRACTITIONER

## 2022-02-02 RX ORDER — AZITHROMYCIN 250 MG/1
TABLET, FILM COATED ORAL
Qty: 6 TABLET | Refills: 0 | OUTPATIENT
Start: 2022-02-02 | End: 2022-11-10

## 2022-02-02 RX ORDER — BENZONATATE 100 MG/1
100 CAPSULE ORAL 3 TIMES DAILY PRN
Qty: 30 CAPSULE | Refills: 0 | OUTPATIENT
Start: 2022-02-02 | End: 2022-11-10

## 2022-02-02 RX ORDER — BENZOYL PEROXIDE 100 MG/ML
LIQUID TOPICAL
COMMUNITY
Start: 2021-12-15 | End: 2022-11-10

## 2022-02-02 NOTE — PROGRESS NOTES
"Chief Complaint  Sore Throat (2 days), Headache (sinus headache), and Cough (taking pearls and mucinex)    Subjective          Dallas Hurt presents to [facility]  Patient reports sore throat, sinus drainage, nasal congestion, headache x 2 days. Denies fever, shortness of breath, loss of taste/smell.  Decreased appetite.  Drinking well.  Plenty of urine output.  He has been treating with Mucinex and Tessalon Perles with improvement.  Patient admits that he does not wears masks or practice social distancing. Patient states four days ago he was around a large group of people, denies known COVID19 exposures. Patient states that he gets sinus infections once or twice a year, will come in and \"get a ZPak and feel better in a day or two\". Patient requesting ZPak today.       Objective   Vital Signs:   /72   Pulse 82   Temp 98.2 °F (36.8 °C)   Ht 182.9 cm (72.01\")   Wt 120 kg (264 lb)   SpO2 98%   BMI 35.79 kg/m²     Physical Exam  Constitutional:       Appearance: Normal appearance. He is normal weight.   HENT:      Head: Normocephalic and atraumatic.      Right Ear: Tympanic membrane normal.      Left Ear: Tympanic membrane normal.      Nose: Nose normal.      Mouth/Throat:      Mouth: Mucous membranes are moist.      Pharynx: Oropharynx is clear.   Eyes:      Extraocular Movements: Extraocular movements intact.      Conjunctiva/sclera: Conjunctivae normal.      Pupils: Pupils are equal, round, and reactive to light.   Cardiovascular:      Rate and Rhythm: Normal rate and regular rhythm.      Heart sounds: Normal heart sounds.   Pulmonary:      Effort: Pulmonary effort is normal.      Breath sounds: Normal breath sounds.   Skin:     General: Skin is warm and dry.   Neurological:      General: No focal deficit present.      Mental Status: He is alert and oriented to person, place, and time.   Psychiatric:         Mood and Affect: Mood normal.         Behavior: " Behavior normal.         Thought Content: Thought content normal.        Result Review :                 Assessment and Plan    Diagnoses and all orders for this visit:    1. Viral URI with cough (Primary)  Assessment & Plan:  Exam reassuring, lung sounds clear, O2 sat 98%. Influenza negative, COVID and strep swabs negative. Discussed with patient the potential for early false negative COVID19 testing prior to five days of symptoms.  Discussed course of viral illness and that an antibiotic is not indicated at this time. Educated patient that azithromycin is not appropriate in treating sinusitis whether viral or bacterial and on antibiotic resistance. Due to patients request and potential antiinflammatory properties will send to pharmacy, patient aware that it is recommended to treat conservatively and not take the antibiotic. Refill of Tessalon Perles. Discussed home quarantine when sick for at least 72 hours post fever resolution, masking, avoiding exposure to other sick or high risk contacts, cough etiquette, hand hygiene, disinfecting surfaces, and avoiding touching eyes, nose and mouth. Continue supportive care. Increase fluids, monitor output. Will seek medical attention immediately with fever, cough, shortness of breath. Patient to call or return to clinic if symptoms worsen or persist.       Orders:  -     POC Rapid Strep A  -     POCT SARS-CoV-2 Antigen VERNON  -     POC Influenza A / B  -     Beta Strep Culture, Throat - Swab, Throat    2. Sore throat  -     POC Rapid Strep A  -     POCT SARS-CoV-2 Antigen VERNON  -     POC Influenza A / B  -     Beta Strep Culture, Throat - Swab, Throat    Other orders  -     azithromycin (Zithromax Z-Jose D) 250 MG tablet; Take 2 tablets by mouth on day 1, then 1 tablet daily on days 2-5  Dispense: 6 tablet; Refill: 0  -     benzonatate (Tessalon Perles) 100 MG capsule; Take 1 capsule by mouth 3 (Three) Times a Day As Needed for Cough.  Dispense: 30 capsule; Refill: 0      Follow  Up   Return if symptoms worsen or fail to improve.  Patient was given instructions and counseling regarding his condition or for health maintenance advice. Please see specific information pulled into the AVS if appropriate.

## 2022-02-02 NOTE — TELEPHONE ENCOUNTER
Caller: Dallas Hurt    Relationship: Self    Best call back number: [phone]    What medication are you requesting: ANTIBOTIC    What are your current symptoms: SORE THROAT, SINUS DRAINAGE     How long have you been experiencing symptoms: LESS THEN 24 HOURS    Have you had these symptoms before:    [x] Yes  [] No    Have you been treated for these symptoms before:   [x] Yes  [] No    If a prescription is needed, what is your preferred pharmacy and phone number:  [facility] [address] [place] [address] [phone] [facility] [phone]   [phone]    Additional notes: PLEASE CALL PATIENT AND ADVISE

## 2022-02-02 NOTE — TELEPHONE ENCOUNTER
"Red rule verified and correct.    req abx for \"sinus infection.    C/o forehead pain, sinus HA, PND watery eyes and severe sore throat.    S/s x 2 days; states PCP usually calls abx in.    Denies fever, sweats, body aches  Advised with s/s only x 2 days they will not send abx in.  Will need an appt.      Appt made for today.     Taking mucinex and a \"cough pill\".  "

## 2022-02-02 NOTE — ASSESSMENT & PLAN NOTE
Exam reassuring, lung sounds clear, O2 sat 98%. Influenza negative, COVID and strep swabs negative. Discussed with patient the potential for early false negative COVID19 testing prior to five days of symptoms.  Discussed course of viral illness and that an antibiotic is not indicated at this time. Educated patient that azithromycin is not appropriate in treating sinusitis whether viral or bacterial and on antibiotic resistance. Due to patients request and potential antiinflammatory properties will send to pharmacy, patient aware that it is recommended to treat conservatively and not take the antibiotic. Refill of Tessalon Perles. Discussed home quarantine when sick for at least 72 hours post fever resolution, masking, avoiding exposure to other sick or high risk contacts, cough etiquette, hand hygiene, disinfecting surfaces, and avoiding touching eyes, nose and mouth. Continue supportive care. Increase fluids, monitor output. Will seek medical attention immediately with fever, cough, shortness of breath. Patient to call or return to clinic if symptoms worsen or persist.

## 2022-02-04 LAB — BACTERIA SPEC AEROBE CULT: NORMAL

## 2022-02-12 NOTE — PROGRESS NOTES
Date of Office Visit: 2017  Encounter Provider: Alan Iniguez MD  Place of Service: [facility] CARD[facility]  Patient Name: Dallas Hurt  :1960  [id number]    Chief Complaint   Patient presents with   • Atrial Fibrillation   :     HPI: Dallas Hurt is a 58 y.o. male  he's a gentleman that's had paroxysmal A. fib and is here for follow-up  He had a history of an AFib ablation in the past prior to seeing me.  He then had a recurrence and had been tried on Tikosyn, sotalol, and when I saw him, I switched him off that on to flecainide and he has done well.  He does not really think that he has had any recurrent AFib that he knows of.  Occasionally, he will lie down and he feels his heart beating, he can feel it and hear it in his ear, but it always sounds regular.  He has lost about 30 pounds, but has not changed since the last time we saw him.  He has a CHADS2-VASC score of 0, but yet he has been anticoagulated.                 Past Medical History:   Diagnosis Date   • Atrial fibrillation (CMS/HCC)    • Atrial flutter (CMS/HCC)    • Diabetes mellitus (CMS/HCC)    • Heart disease    • Hyperlipidemia    • Hypertension    • Low testosterone        Past Surgical History:   Procedure Laterality Date   • ABLATION OF DYSRHYTHMIC FOCUS     • COLONOSCOPY         Social History     Social History   • Marital status:      Spouse name: N/A   • Number of children: N/A   • Years of education: N/A     Occupational History   • Not on file.     Social History Main Topics   • Smoking status: Former Smoker   • Smokeless tobacco: Never Used   • Alcohol use No   • Drug use: No   • Sexual activity: Defer     Other Topics Concern   • Not on file     Social History Narrative   • No narrative on file       Family History   Problem Relation Age of Onset   • Diabetes Other    • Diabetes Mother    • Atrial fibrillation Father    • Diabetes Father    • Cancer Father    • Atrial fibrillation Brother   Patient bg 112 after bolus D10, patient going to dialysis, NADN, AAox3     • Hypertension Brother    • Aneurysm Paternal Grandmother    • No Known Problems Maternal Grandmother    • No Known Problems Maternal Grandfather    • No Known Problems Paternal Grandfather        Review of Systems   Constitution: Negative for decreased appetite, fever, malaise/fatigue and weight loss.   HENT: Negative for nosebleeds.    Eyes: Negative for double vision.   Cardiovascular: Negative for chest pain, claudication, cyanosis, dyspnea on exertion, irregular heartbeat, leg swelling, near-syncope, orthopnea, palpitations, paroxysmal nocturnal dyspnea and syncope.   Respiratory: Negative for cough, hemoptysis and shortness of breath.    Hematologic/Lymphatic: Negative for bleeding problem.   Skin: Negative for rash.   Musculoskeletal: Negative for falls and myalgias.   Gastrointestinal: Negative for hematochezia, jaundice, melena, nausea and vomiting.   Genitourinary: Negative for hematuria.   Neurological: Negative for dizziness and seizures.   Psychiatric/Behavioral: Negative for altered mental status and memory loss.       Allergies   Allergen Reactions   • Morphine And Related          Current Outpatient Prescriptions:   •  atorvastatin (LIPITOR) 20 MG tablet, Take 20 mg by mouth Daily., Disp: , Rfl:   •  cetirizine (zyrTEC) 10 MG tablet, Take 10 mg by mouth Daily., Disp: , Rfl:   •  cyclobenzaprine (FLEXERIL) 10 MG tablet, Take 10 mg by mouth 3 (Three) Times a Day As Needed for muscle spasms., Disp: , Rfl:   •  docusate sodium (COLACE) 100 MG capsule, Take 100 mg by mouth 2 (Two) Times a Day., Disp: , Rfl:   •  flecainide (TAMBOCOR) 50 MG tablet, TAKE ONE TABLET BY MOUTH TWICE DAILY, Disp: 180 tablet, Rfl: 3  •  lansoprazole (PREVACID) 30 MG capsule, Take 30 mg by mouth Daily., Disp: , Rfl:   •  montelukast (SINGULAIR) 10 MG tablet, Take 10 mg by mouth Every Night., Disp: , Rfl:   •  naproxen sodium (ALEVE) 220 MG tablet, Take 320 mg by mouth 2 (Two) Times a Day As Needed for Mild Pain ., Disp: , Rfl:  "  •  sildenafil (VIAGRA) 100 MG tablet, Take 100 mg by mouth Daily As Needed for erectile dysfunction., Disp: , Rfl:   •  SITagliptin (JANUVIA) 100 MG tablet, Take 100 mg by mouth Daily., Disp: , Rfl:   •  SYNJARDY XR  MG tablet sustained-release 24 hour, Take 1 tablet by mouth Daily., Disp: , Rfl:   •  TRULICITY 1.5 MG/0.5ML solution pen-injector, Inject 1.5 mg as directed 1 (One) Time Per Week., Disp: , Rfl:      Objective:     Vitals:    10/31/18 1548   BP: 120/70   Pulse: 74   Weight: 124 kg (274 lb)   Height: 182.9 cm (72\")     Body mass index is 37.16 kg/m².    Physical Exam   Constitutional: He is oriented to person, place, and time. He appears well-developed and well-nourished.   overweight   HENT:   Head: Normocephalic.   Eyes: No scleral icterus.   Neck: No JVD present. No thyromegaly present.   Cardiovascular: Normal rate, regular rhythm and normal heart sounds.  Exam reveals no gallop and no friction rub.    No murmur heard.  Pulmonary/Chest: Effort normal and breath sounds normal. He has no wheezes. He has no rales.   Abdominal: Soft. There is no hepatosplenomegaly. There is no tenderness.   Musculoskeletal: Normal range of motion. He exhibits no edema.   Lymphadenopathy:     He has no cervical adenopathy.   Neurological: He is alert and oriented to person, place, and time.   Skin: Skin is warm and dry. No rash noted.   Psychiatric: He has a normal mood and affect.         ECG 12 Lead  Date/Time: 10/31/2018 4:57 PM  Performed by: EDITH DEL VALLE  Authorized by: EDITH DEL VALLE   Comparison: compared with previous ECG   Rhythm: sinus rhythm  Clinical impression: normal ECG             Assessment:       Diagnosis Plan   1. Paroxysmal atrial fibrillation (CMS/HCC)     2. Non morbid obesity due to excess calories            Plan:       I think in general the patient is doing okay. I am going to make changes. I am stopping his Xarelto. He has a [id number]-OAJq score of 0. I do not think he is having much " AFib; so I think the risk of bleeding actually goes up with him versus the benefit. I am going to also recommend that we stop his lisinopril. His blood pressure is lowish. He feels like it gets low at times and we are going to see how he does off of that. I am going to have him come back and see me in a year. I did encourage him to try and lose some more weight. I think if he recurs, I am going to have send him to see the EP service.         As always, it has been a pleasure to participate in your patient's care.    Atrial Fibrillation and Atrial Flutter  Assessment  • The patient has paroxysmal atrial fibrillation  • This is non-valvular in etiology  • The patient's CHADS2-VASc score is 0  • A [id number] score of 0 is considered a low risk for a thromboembolic event    Plan  • Attempt to maintain sinus rhythm  • Continue flecainide for rhythm control      Sincerely,       Alan Iniguez MD

## 2022-02-14 ENCOUNTER — TELEPHONE (OUTPATIENT)
Dept: CARDIOLOGY | Facility: CLINIC | Age: 62
End: 2022-02-14

## 2022-02-14 RX ORDER — OMEPRAZOLE 20 MG/1
CAPSULE, DELAYED RELEASE ORAL
Qty: 90 CAPSULE | Refills: 0 | Status: SHIPPED | OUTPATIENT
Start: 2022-02-14 | End: 2022-06-22

## 2022-02-14 RX ORDER — FLECAINIDE ACETATE 50 MG/1
TABLET ORAL
Qty: 180 TABLET | Refills: 0 | Status: SHIPPED | OUTPATIENT
Start: 2022-02-14 | End: 2022-06-22

## 2022-02-14 RX ORDER — MONTELUKAST SODIUM 10 MG/1
TABLET ORAL
Qty: 90 TABLET | Refills: 0 | Status: SHIPPED | OUTPATIENT
Start: 2022-02-14 | End: 2022-07-06

## 2022-03-16 NOTE — PROGRESS NOTES
Chief Complaint: Urologic complaint    Subjective         History of Present Illness  Dallas Hurt is a 61 y.o. M    ED    Patient can get partial erections but they do not last.  This has been getting worse for the last few years.    Voiding without issue.  He does have some frequency he voids about every 45 minutes to 1 hour.    Tadalafil 10 mg daily-helps some, but still not working well enough.  Was taking 10 by mistake and he was doing good with no side effects was left on this dose.    2015 Viagra - caused severe headache    no gross hematuria, patient had 2 kidney stones, passed potentially.    No urologic family history,   Has never had any urologic surgery.    History of A. fib status post ablation. No CAD.  Patient does not smoke.  ASA 81    12/21 0.98, GFR 78    PSA    2/21   0.31  6/19   0.37  Objective     Past Medical History:   Diagnosis Date   • Atrial fibrillation (HCC)    • Atrial flutter (HCC)    • Diabetes mellitus (HCC)    • Hyperlipidemia    • Low testosterone        Past Surgical History:   Procedure Laterality Date   • ABLATION OF DYSRHYTHMIC FOCUS     • COLONOSCOPY           Current Outpatient Medications:   •  Accu-Chek Softclix Lancets lancets, 1 each by Other route 3 (Three) Times a Day. Use as instructed two to three times daily, Disp: 100 each, Rfl: 2  •  aspirin 81 MG EC tablet, Take 81 mg by mouth Daily., Disp: , Rfl:   •  atorvastatin (LIPITOR) 20 MG tablet, Take 1 tablet by mouth once daily, Disp: 90 tablet, Rfl: 0  •  azithromycin (Zithromax Z-Jose D) 250 MG tablet, Take 2 tablets by mouth on day 1, then 1 tablet daily on days 2-5, Disp: 6 tablet, Rfl: 0  •  benzonatate (Tessalon Perles) 100 MG capsule, Take 1 capsule by mouth 3 (Three) Times a Day As Needed for Cough., Disp: 30 capsule, Rfl: 0  •  benzoyl peroxide 10 % external wash, , Disp: , Rfl:   •  betamethasone dipropionate (DIPROLENE) 0.05 % lotion, Apply 0.5 application topically to the appropriate area as directed As  Needed., Disp: , Rfl:   •  cetirizine (zyrTEC) 10 MG tablet, Take 10 mg by mouth Daily., Disp: , Rfl:   •  chlorhexidine (Peridex) 0.12 % solution, Apply 15 mL to the mouth or throat 2 (Two) Times a Day. Swish and spit 15 mls by mouth twice daily, Disp: 118 mL, Rfl: 1  •  cyclobenzaprine (FLEXERIL) 10 MG tablet, Take 10 mg by mouth 3 (Three) Times a Day As Needed for muscle spasms., Disp: , Rfl:   •  Dexilant 60 MG capsule, Take 60 capsules by mouth Daily., Disp: , Rfl:   •  diphenhydrAMINE (BENADRYL) 25 MG tablet, Take 25 mg by mouth As Needed for Itching., Disp: , Rfl:   •  docusate sodium (COLACE) 100 MG capsule, Take 100 mg by mouth Daily., Disp: , Rfl:   •  Dulaglutide (Trulicity) 3 MG/0.5ML solution pen-injector, Inject 0.5 mL under the skin into the appropriate area as directed Every 7 (Seven) Days., Disp: 12 mL, Rfl: 1  •  empagliflozin (Jardiance) 25 MG tablet tablet, Take 1 tablet by mouth Daily., Disp: 90 tablet, Rfl: 1  •  flecainide (TAMBOCOR) 50 MG tablet, Take 1 tablet by mouth twice daily, Disp: 180 tablet, Rfl: 0  •  glucose blood (Accu-Chek Kaylee Plus) test strip, 1 each by Other route 3 (Three) Times a Day. Use as instructed two to three times daily, Disp: 90 each, Rfl: 1  •  glucose blood test strip, Use as instructed, Disp: 200 each, Rfl: 3  •  glucose monitor monitoring kit, 1 each 2 (Two) Times a Day As Needed (to test blood sugar). DX: E11.9, Disp: 1 each, Rfl: 0  •  ketoconazole (NIZORAL) 2 % cream, Apply 1 application topically to the appropriate area as directed Daily., Disp: , Rfl:   •  metFORMIN ER (GLUCOPHAGE-XR) 500 MG 24 hr tablet, TAKE 1 TABLET BY MOUTH TWICE DAILY WITH MEALS, Disp: 180 tablet, Rfl: 0  •  montelukast (SINGULAIR) 10 MG tablet, TAKE 1 TABLET BY MOUTH ONCE DAILY IN THE EVENING, Disp: 90 tablet, Rfl: 0  •  mupirocin (Bactroban) 2 % ointment, Apply 1 application topically to the appropriate area as directed 3 (Three) Times a Day., Disp: 1 g, Rfl: 1  •  naproxen sodium  (ALEVE) 220 MG tablet, Take 320 mg by mouth 2 (Two) Times a Day As Needed for Mild Pain . TAKING 2 TABS BID, Disp: , Rfl:   •  omeprazole (priLOSEC) 20 MG capsule, TAKE 1 CAPSULE BY MOUTH ONCE DAILY BEFORE A MEAL, Disp: 90 capsule, Rfl: 0  •  SSD 1 % cream, APPLY TOPICALLY TO THE APPROPRIATE AREA(S) AS DIRECTED DAILY., Disp: 85 g, Rfl: 0  •  tadalafil (CIALIS) 10 MG tablet, Take 1 tablet by mouth once daily, Disp: 90 tablet, Rfl: 0    Allergies   Allergen Reactions   • Morphine And Related         Family History   Problem Relation Age of Onset   • Diabetes Other    • Diabetes Mother    • Atrial fibrillation Father    • Diabetes Father    • Cancer Father    • Atrial fibrillation Brother    • Hypertension Brother    • Aneurysm Paternal Grandmother    • No Known Problems Maternal Grandmother    • No Known Problems Maternal Grandfather    • No Known Problems Paternal Grandfather        Social History     Socioeconomic History   • Marital status:    Tobacco Use   • Smoking status: Former Smoker     Packs/day: 2.00     Years: 33.00     Pack years: 66.00     Types: Cigarettes     Quit date: 3/8/2008     Years since quittin.0   • Smokeless tobacco: Never Used   • Tobacco comment: CAFFEINE USE: 2 CUPS COFFEE DAILY/ 2 LITER COKE ZERO DAILY   Substance and Sexual Activity   • Alcohol use: Yes     Alcohol/week: 3.0 standard drinks     Types: 3 Shots of liquor per week   • Drug use: No   • Sexual activity: Defer       Vital Signs:   There were no vitals taken for this visit.     Physical exam    Alert and orient x3  Well appearing, well developed, in no acute distress   Unlabored respirations  Nontender/nondistended      Grossly oriented to person, place and time, judgment is intact, normal mood and affect    Results for orders placed or performed in visit on 22   Beta Strep Culture, Throat - Swab, Throat    Specimen: Throat; Swab   Result Value Ref Range    Throat Culture, Beta Strep No Beta Hemolytic  Streptococcus Isolated    POC Rapid Strep A    Specimen: Swab   Result Value Ref Range    Rapid Strep A Screen Negative Negative, VALID, INVALID, Not Performed    Internal Control Passed Passed    Lot Number [id number]     Expiration Date 5,232,023    POCT SARS-CoV-2 Antigen VERNON    Specimen: Swab   Result Value Ref Range    SARS Antigen Not Detected Not Detected    Internal Control Passed Passed    Lot Number [id number]     Expiration Date 9,122,023    POC Influenza A / B    Specimen: Swab   Result Value Ref Range    Rapid Influenza A Ag Negative Negative    Rapid Influenza B Ag Negative Negative    Internal Control Passed Passed    Lot Number [id number]     Expiration Date 5,052,022              Assessment and Plan    Diagnoses and all orders for this visit:    1. Erectile dysfunction, unspecified erectile dysfunction type (Primary)      Patient is bothered we did discuss the algorithm for treatment erectile dysfunction.  He will stop daily tadalafil. start taking tadalafil 20 mg as needed.  Risk and benefits discussed.        He has never tried this and we will see if this helps.  If this does not help we will follow back up in 1 month for teaching of Trimix.  Trial vial ordered today    Patient understands if he has an erection greater than 4 hours he should go to the emergency room or risk never having erection again    We will also have to see if stopping tadalafil makes any hard for him to void and see how he does with his frequency    We did discuss we could consider an alpha-blocker if things get worse    PVR at f/u

## 2022-03-18 ENCOUNTER — OFFICE VISIT (OUTPATIENT)
Dept: UROLOGY | Facility: CLINIC | Age: 62
End: 2022-03-18

## 2022-03-18 VITALS — BODY MASS INDEX: 35.76 KG/M2 | HEIGHT: 72 IN | WEIGHT: 264 LBS | RESPIRATION RATE: 16 BRPM

## 2022-03-18 DIAGNOSIS — N52.9 ERECTILE DYSFUNCTION, UNSPECIFIED ERECTILE DYSFUNCTION TYPE: Primary | ICD-10-CM

## 2022-03-18 PROCEDURE — 99204 OFFICE O/P NEW MOD 45 MIN: CPT | Performed by: UROLOGY

## 2022-03-18 RX ORDER — KETOCONAZOLE 20 MG/ML
SHAMPOO TOPICAL
COMMUNITY
Start: 2022-03-14 | End: 2022-12-29 | Stop reason: SDUPTHER

## 2022-04-04 DIAGNOSIS — L98.9 SKIN LESION: ICD-10-CM

## 2022-04-04 RX ORDER — ATORVASTATIN CALCIUM 20 MG/1
TABLET, FILM COATED ORAL
Qty: 90 TABLET | Refills: 0 | Status: SHIPPED | OUTPATIENT
Start: 2022-04-04 | End: 2022-07-22

## 2022-04-04 RX ORDER — KETOCONAZOLE 20 MG/G
CREAM TOPICAL
Qty: 60 G | Refills: 0 | Status: SHIPPED | OUTPATIENT
Start: 2022-04-04 | End: 2022-05-16

## 2022-04-04 RX ORDER — FLUCONAZOLE 150 MG/1
TABLET ORAL
Qty: 2 TABLET | Refills: 0 | OUTPATIENT
Start: 2022-04-04 | End: 2022-11-10

## 2022-04-04 RX ORDER — METFORMIN HYDROCHLORIDE 500 MG/1
TABLET, EXTENDED RELEASE ORAL
Qty: 180 TABLET | Refills: 0 | Status: SHIPPED | OUTPATIENT
Start: 2022-04-04 | End: 2022-07-01 | Stop reason: SDUPTHER

## 2022-04-05 RX ORDER — SILVER SULFADIAZINE 1 %
CREAM (GRAM) TOPICAL
Qty: 85 G | Refills: 0 | Status: SHIPPED | OUTPATIENT
Start: 2022-04-05 | End: 2022-05-17 | Stop reason: SDUPTHER

## 2022-04-15 ENCOUNTER — TELEPHONE (OUTPATIENT)
Dept: UROLOGY | Facility: CLINIC | Age: 62
End: 2022-04-15

## 2022-04-15 NOTE — TELEPHONE ENCOUNTER
CALLED PT TO RS CX APPT TO NEXT AVAILABLE REYES/[facility] FOR TRIMIX TEACHING/ONCE APPT IS RS PLEASE INFORM DALI SO SHE CAN REORDER TRIMIX/LMOM

## 2022-05-16 RX ORDER — KETOCONAZOLE 20 MG/G
CREAM TOPICAL
Qty: 60 G | Refills: 0 | Status: SHIPPED | OUTPATIENT
Start: 2022-05-16 | End: 2022-06-22

## 2022-06-17 ENCOUNTER — OFFICE VISIT (OUTPATIENT)
Dept: CARDIOLOGY | Facility: CLINIC | Age: 62
End: 2022-06-17

## 2022-06-17 VITALS
SYSTOLIC BLOOD PRESSURE: 115 MMHG | OXYGEN SATURATION: 97 % | HEART RATE: 75 BPM | BODY MASS INDEX: 34.27 KG/M2 | HEIGHT: 72 IN | DIASTOLIC BLOOD PRESSURE: 73 MMHG | WEIGHT: 253 LBS

## 2022-06-17 DIAGNOSIS — I48.0 PAROXYSMAL ATRIAL FIBRILLATION: Primary | ICD-10-CM

## 2022-06-17 DIAGNOSIS — E78.5 HYPERLIPIDEMIA LDL GOAL <100: ICD-10-CM

## 2022-06-17 DIAGNOSIS — E11.59 TYPE 2 DIABETES MELLITUS WITH OTHER CIRCULATORY COMPLICATION, WITHOUT LONG-TERM CURRENT USE OF INSULIN: ICD-10-CM

## 2022-06-17 PROCEDURE — 99214 OFFICE O/P EST MOD 30 MIN: CPT | Performed by: NURSE PRACTITIONER

## 2022-06-17 PROCEDURE — 93000 ELECTROCARDIOGRAM COMPLETE: CPT | Performed by: NURSE PRACTITIONER

## 2022-06-17 NOTE — PROGRESS NOTES
[facility] Follow Up Office Note     Encounter Date:22  Patient:Dallas Hurt  :1960  MRN:[id number]      Chief Complaint:   Chief Complaint   Patient presents with   • Follow-up         History of Presenting Illness:        Dallas Hurt is a 62 y.o. male who is here for follow-up of atrial fibrillation. He is a patient of Dr Iniguez.    Patient has PAF and had previous ablation. He had recurrence and is now on flecainide without recurrence.  He has been off of anticoagulation. When he was seen by Dr. Iniguez it was recommended to continue off unless recurs.    Patient was last seen by Suha NATHAN 2020 via telehealth. He was doing well from a cardiac perspective at this time. His blood sugars were poorly controlled and his PCP was managing this.  Increasing activity level was recommended.    Patient states he has been doing well.  His largest concern is his glucose control and he plans to follow-up with PCP soon. He has lost weight and is below 250 lbs for the first time since he was in his 30s.  He denies palpitations and reports he has not had these in a very long time. He tells me flecainide tastes really bad if he does not take it quickly enough.    Denies shortness of breath, chest pain, palpitations, lightheadedness, RISHABH, orthopnea.  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  He reports he tries to watch what he eats but he eats poorly when he travels.    Review of Systems:  Review of Systems   Cardiovascular: Negative for chest pain, dyspnea on exertion, leg swelling, orthopnea and palpitations.   Respiratory: Negative for shortness of breath.        Current Outpatient Medications on File Prior to Visit   Medication Sig Dispense Refill   • Accu-Chek Softclix Lancets lancets 1 each by Other route 3 (Three) Times a Day. Use as instructed two to three times daily 100 each 2   • aspirin 81 MG  EC tablet Take 81 mg by mouth Daily.     • atorvastatin (LIPITOR) 20 MG tablet Take 1 tablet by mouth once daily 90 tablet 0   • azithromycin (Zithromax Z-Jose D) 250 MG tablet Take 2 tablets by mouth on day 1, then 1 tablet daily on days 2-5 6 tablet 0   • benzonatate (Tessalon Perles) 100 MG capsule Take 1 capsule by mouth 3 (Three) Times a Day As Needed for Cough. 30 capsule 0   • benzoyl peroxide 10 % external wash      • betamethasone dipropionate (DIPROLENE) 0.05 % lotion Apply 0.5 application topically to the appropriate area as directed As Needed.     • cetirizine (zyrTEC) 10 MG tablet Take 10 mg by mouth Daily.     • chlorhexidine (Peridex) 0.12 % solution Apply 15 mL to the mouth or throat 2 (Two) Times a Day. Swish and spit 15 mls by mouth twice daily 118 mL 1   • cyclobenzaprine (FLEXERIL) 10 MG tablet Take 10 mg by mouth 3 (Three) Times a Day As Needed for muscle spasms.     • Dexilant 60 MG capsule Take 60 capsules by mouth Daily.     • diphenhydrAMINE (BENADRYL) 25 MG tablet Take 25 mg by mouth As Needed for Itching.     • docusate sodium (COLACE) 100 MG capsule Take 100 mg by mouth Daily.     • Dulaglutide (Trulicity) 3 MG/0.5ML solution pen-injector Inject 0.5 mL under the skin into the appropriate area as directed Every 7 (Seven) Days. 12 mL 1   • empagliflozin (Jardiance) 25 MG tablet tablet Take 1 tablet by mouth Daily. 90 tablet 1   • flecainide (TAMBOCOR) 50 MG tablet Take 1 tablet by mouth twice daily 180 tablet 0   • fluconazole (DIFLUCAN) 150 MG tablet TAKE 1 TABLET BY MOUTH ONCE. REPEAT IN 3 DAYS 2 tablet 0   • glucose blood (Accu-Chek Kaylee Plus) test strip 1 each by Other route 3 (Three) Times a Day. Use as instructed two to three times daily 90 each 1   • glucose blood test strip Use as instructed 200 each 3   • glucose monitor monitoring kit 1 each 2 (Two) Times a Day As Needed (to test blood sugar). DX: E11.9 1 each 0   • ketoconazole (NIZORAL) 2 % cream APPLY TOPICALLY TO THE AFFECTED  AREA(S) TWICE DAILY 60 g 0   • ketoconazole (NIZORAL) 2 % shampoo SHAMPOO ONE TO TWO TIMES PER WEEK. LEAVE IN FOR 2 TO 3 MINUTES BEFORE RINSING.     • metFORMIN ER (GLUCOPHAGE-XR) 500 MG 24 hr tablet TAKE 1 TABLET BY MOUTH TWICE DAILY WITH MEALS 180 tablet 0   • montelukast (SINGULAIR) 10 MG tablet TAKE 1 TABLET BY MOUTH ONCE DAILY IN THE EVENING 90 tablet 0   • mupirocin (Bactroban) 2 % ointment Apply 1 application topically to the appropriate area as directed 3 (Three) Times a Day. 1 g 1   • naproxen sodium (ALEVE) 220 MG tablet Take 320 mg by mouth 2 (Two) Times a Day As Needed for Mild Pain . TAKING 2 TABS BID     • omeprazole (priLOSEC) 20 MG capsule TAKE 1 CAPSULE BY MOUTH ONCE DAILY BEFORE A MEAL 90 capsule 0   • silver sulfadiazine (SSD) 1 % cream Apply  topically to the appropriate area as directed 2 (Two) Times a Day. 400 g 1   • tadalafil (CIALIS) 10 MG tablet Take 1 tablet by mouth once daily 90 tablet 0     No current facility-administered medications on file prior to visit.       Allergies   Allergen Reactions   • Morphine And Related        Past Medical History:   Diagnosis Date   • Atrial fibrillation (HCC)    • Atrial flutter (HCC)    • Diabetes mellitus (HCC)    • Hyperlipidemia    • Low testosterone        Past Surgical History:   Procedure Laterality Date   • ABLATION OF DYSRHYTHMIC FOCUS     • COLONOSCOPY         Social History     Socioeconomic History   • Marital status:    Tobacco Use   • Smoking status: Former Smoker     Packs/day: 2.00     Years: 33.00     Pack years: 66.00     Types: Cigarettes     Quit date: 3/8/2008     Years since quittin.2   • Smokeless tobacco: Never Used   • Tobacco comment: CAFFEINE USE: 2 CUPS COFFEE DAILY/ 2 LITER COKE ZERO DAILY   Substance and Sexual Activity   • Alcohol use: Yes     Alcohol/week: 3.0 standard drinks     Types: 3 Shots of liquor per week   • Drug use: No   • Sexual activity: Defer       Family History   Problem Relation Age of Onset  "  • Diabetes Other    • Diabetes Mother    • Atrial fibrillation Father    • Diabetes Father    • Cancer Father    • Atrial fibrillation Brother    • Hypertension Brother    • Aneurysm Paternal Grandmother    • No Known Problems Maternal Grandmother    • No Known Problems Maternal Grandfather    • No Known Problems Paternal Grandfather        The following portions of the patient's history were reviewed and updated as appropriate: allergies, current medications, past family history, past medical history, past social history, past surgical history and problem list.       Objective:       Vitals:    06/17/22 1254   BP: 115/73   BP Location: Left arm   Patient Position: Sitting   Cuff Size: Adult   Pulse: 75   SpO2: 97%   Weight: 115 kg (253 lb)   Height: 182.9 cm (72\")         Physical Exam:  Constitutional: Well appearing, well developed, no acute distress   HENT: Oropharynx clear and membrane moist  Eyes: Normal conjunctiva, no sclera icterus  Neck: Supple, no carotid bruit bilaterally  Cardiovascular: Regular rate and rhythm, No Murmur, No bilateral lower extremity edema  Pulmonary: Normal respiratory effort, normal lung sounds, no wheezing  Neurological: Alert and orient x 3  Skin: Warm, dry, no ecchymosis, no rash  Psych: Appropriate mood and affect. Normal judgment and insight         Lab Results   Component Value Date     (L) 12/14/2021     (L) 10/25/2021    K 4.6 12/14/2021    K 4.3 10/25/2021    CL 98 12/14/2021    CL 99 10/25/2021    CO2 23.9 12/14/2021    CO2 22.9 10/25/2021    BUN 11 12/14/2021    BUN 11 10/25/2021    CREATININE 0.98 12/14/2021    CREATININE 0.79 10/25/2021    EGFRIFNONA 78 12/14/2021    EGFRIFNONA 100 10/25/2021    GLUCOSE 288 (H) 12/14/2021    GLUCOSE 203 (H) 10/25/2021    CALCIUM 9.9 12/14/2021    CALCIUM 9.8 10/25/2021    ALBUMIN 4.30 12/14/2021    ALBUMIN 4.50 10/25/2021    BILITOT 0.4 12/14/2021    BILITOT 0.5 10/25/2021    AST 23 12/14/2021    AST 23 10/25/2021    ALT 37 " 12/14/2021    ALT 42 (H) 10/25/2021     Lab Results   Component Value Date    WBC 7.33 12/14/2021    WBC 7.22 10/25/2021    HGB 16.4 12/14/2021    HGB 16.8 10/25/2021    HCT 48.8 12/14/2021    HCT 50.6 10/25/2021    MCV 86.8 12/14/2021    MCV 87.8 10/25/2021     12/14/2021     10/25/2021     Lab Results   Component Value Date    CHOL 156 12/14/2021    CHOL 174 10/25/2021    TRIG 239 (H) 12/14/2021    TRIG 168 (H) 10/25/2021    HDL 34 (L) 12/14/2021    HDL 40 10/25/2021    LDL 82 12/14/2021     (H) 10/25/2021     No results found for: PROBNP, BNP  No results found for: CKTOTAL, CKMB, CKMBINDEX, TROPONINI, TROPONINT  Lab Results   Component Value Date    TSH 2.010 12/14/2021    TSH 1.260 10/25/2021           ECG 12 Lead    Date/Time: 6/17/2022 1:53 PM  Performed by: Elena Swift APRN  Authorized by: Elena Swift APRN   Comparison: compared with previous ECG from 12/11/2022  Similar to previous ECG  Rhythm: sinus rhythm  Rate: normal  Conduction: 1st degree AV block  QRS axis: normal  Other findings: non-specific ST-T wave changes               Assessment:          Diagnosis Plan   1. Paroxysmal atrial fibrillation (HCC)  ECG 12 Lead   2. Type 2 diabetes mellitus with other circulatory complication, without long-term current use of insulin (HCC)     3. Hyperlipidemia LDL goal <100          Plan:       PAF:  - currently sinus. Has been well controlled on flecainide and has not had any palpitations  - off anticoagulation. Instructed to call with palpitations.  Will resume with recurrent atrial fibrillation    HLD:  - lipids at goal 12/2021  - continue atorvastatin    DM II:  - managed by PCP  - lost weight. Discussed lifestyle modifications    Patient is doing well from a cardiac perspective. He denies palpitations and is sinus today. Continue flecainide. He has lost weight.  He tells me in the past he could feel when he was in A fib.  Instructed to call with palpitations and otherwise  will follow-up in one year with Dr. Inigeuz.    Orders Placed This Encounter   Procedures   • ECG 12 Lead     This order was created via procedure documentation     Order Specific Question:   Release to patient     Answer:   Immediate            VENITA Vasquez  [facility]  06/17/22  13:59 EDT

## 2022-06-22 RX ORDER — OMEPRAZOLE 20 MG/1
CAPSULE, DELAYED RELEASE ORAL
Qty: 90 CAPSULE | Refills: 0 | Status: SHIPPED | OUTPATIENT
Start: 2022-06-22 | End: 2022-06-29 | Stop reason: ALTCHOICE

## 2022-06-22 RX ORDER — TADALAFIL 10 MG/1
TABLET ORAL
Qty: 90 TABLET | Refills: 0 | Status: SHIPPED | OUTPATIENT
Start: 2022-06-22 | End: 2022-09-20

## 2022-06-22 RX ORDER — KETOCONAZOLE 20 MG/G
CREAM TOPICAL
Qty: 60 G | Refills: 0 | Status: SHIPPED | OUTPATIENT
Start: 2022-06-22 | End: 2022-09-20

## 2022-06-22 RX ORDER — FLECAINIDE ACETATE 50 MG/1
TABLET ORAL
Qty: 180 TABLET | Refills: 2 | Status: SHIPPED | OUTPATIENT
Start: 2022-06-22

## 2022-06-29 ENCOUNTER — OFFICE VISIT (OUTPATIENT)
Dept: INTERNAL MEDICINE | Facility: CLINIC | Age: 62
End: 2022-06-29

## 2022-06-29 VITALS
BODY MASS INDEX: 34.48 KG/M2 | RESPIRATION RATE: 16 BRPM | DIASTOLIC BLOOD PRESSURE: 66 MMHG | OXYGEN SATURATION: 97 % | HEIGHT: 72 IN | WEIGHT: 254.6 LBS | SYSTOLIC BLOOD PRESSURE: 138 MMHG | HEART RATE: 84 BPM

## 2022-06-29 DIAGNOSIS — E78.5 HYPERLIPIDEMIA LDL GOAL <100: ICD-10-CM

## 2022-06-29 DIAGNOSIS — L60.0 INGROWN TOENAIL: ICD-10-CM

## 2022-06-29 DIAGNOSIS — E11.59 TYPE 2 DIABETES MELLITUS WITH OTHER CIRCULATORY COMPLICATION, WITHOUT LONG-TERM CURRENT USE OF INSULIN: Primary | ICD-10-CM

## 2022-06-29 DIAGNOSIS — E87.1 HYPONATREMIA: ICD-10-CM

## 2022-06-29 PROCEDURE — 80050 GENERAL HEALTH PANEL: CPT | Performed by: INTERNAL MEDICINE

## 2022-06-29 PROCEDURE — 99214 OFFICE O/P EST MOD 30 MIN: CPT | Performed by: INTERNAL MEDICINE

## 2022-06-29 PROCEDURE — 80061 LIPID PANEL: CPT | Performed by: INTERNAL MEDICINE

## 2022-06-29 PROCEDURE — 83036 HEMOGLOBIN GLYCOSYLATED A1C: CPT | Performed by: INTERNAL MEDICINE

## 2022-06-29 RX ORDER — DEXLANSOPRAZOLE 60 MG/1
60 CAPSULE, DELAYED RELEASE ORAL DAILY
Qty: 30 CAPSULE | Refills: 2 | Status: SHIPPED | OUTPATIENT
Start: 2022-06-29 | End: 2022-09-19

## 2022-06-30 LAB
ALBUMIN SERPL-MCNC: 4.4 G/DL (ref 3.5–5.2)
ALBUMIN/GLOB SERPL: 1.8 G/DL
ALP SERPL-CCNC: 68 U/L (ref 39–117)
ALT SERPL W P-5'-P-CCNC: 44 U/L (ref 1–41)
ANION GAP SERPL CALCULATED.3IONS-SCNC: 13 MMOL/L (ref 5–15)
AST SERPL-CCNC: 26 U/L (ref 1–40)
BASOPHILS # BLD AUTO: 0.09 10*3/MM3 (ref 0–0.2)
BASOPHILS NFR BLD AUTO: 1.2 % (ref 0–1.5)
BILIRUB SERPL-MCNC: 0.6 MG/DL (ref 0–1.2)
BUN SERPL-MCNC: 7 MG/DL (ref 8–23)
BUN/CREAT SERPL: 8.4 (ref 7–25)
CALCIUM SPEC-SCNC: 9.8 MG/DL (ref 8.6–10.5)
CHLORIDE SERPL-SCNC: 98 MMOL/L (ref 98–107)
CHOLEST SERPL-MCNC: 159 MG/DL (ref 0–200)
CO2 SERPL-SCNC: 20 MMOL/L (ref 22–29)
CREAT SERPL-MCNC: 0.83 MG/DL (ref 0.76–1.27)
DEPRECATED RDW RBC AUTO: 38.7 FL (ref 37–54)
EGFRCR SERPLBLD CKD-EPI 2021: 99 ML/MIN/1.73
EOSINOPHIL # BLD AUTO: 0.13 10*3/MM3 (ref 0–0.4)
EOSINOPHIL NFR BLD AUTO: 1.7 % (ref 0.3–6.2)
ERYTHROCYTE [DISTWIDTH] IN BLOOD BY AUTOMATED COUNT: 12.3 % (ref 12.3–15.4)
GLOBULIN UR ELPH-MCNC: 2.5 GM/DL
GLUCOSE SERPL-MCNC: 231 MG/DL (ref 65–99)
HBA1C MFR BLD: 11.9 % (ref 4.8–5.6)
HCT VFR BLD AUTO: 52.5 % (ref 37.5–51)
HDLC SERPL-MCNC: 40 MG/DL (ref 40–60)
HGB BLD-MCNC: 17.6 G/DL (ref 13–17.7)
IMM GRANULOCYTES # BLD AUTO: 0.03 10*3/MM3 (ref 0–0.05)
IMM GRANULOCYTES NFR BLD AUTO: 0.4 % (ref 0–0.5)
LDLC SERPL CALC-MCNC: 89 MG/DL (ref 0–100)
LDLC/HDLC SERPL: 2.1 {RATIO}
LYMPHOCYTES # BLD AUTO: 2.41 10*3/MM3 (ref 0.7–3.1)
LYMPHOCYTES NFR BLD AUTO: 31.3 % (ref 19.6–45.3)
MCH RBC QN AUTO: 29.3 PG (ref 26.6–33)
MCHC RBC AUTO-ENTMCNC: 33.5 G/DL (ref 31.5–35.7)
MCV RBC AUTO: 87.4 FL (ref 79–97)
MONOCYTES # BLD AUTO: 0.56 10*3/MM3 (ref 0.1–0.9)
MONOCYTES NFR BLD AUTO: 7.3 % (ref 5–12)
NEUTROPHILS NFR BLD AUTO: 4.47 10*3/MM3 (ref 1.7–7)
NEUTROPHILS NFR BLD AUTO: 58.1 % (ref 42.7–76)
NRBC BLD AUTO-RTO: 0 /100 WBC (ref 0–0.2)
PLATELET # BLD AUTO: 226 10*3/MM3 (ref 140–450)
PMV BLD AUTO: 10.9 FL (ref 6–12)
POTASSIUM SERPL-SCNC: 4.5 MMOL/L (ref 3.5–5.2)
PROT SERPL-MCNC: 6.9 G/DL (ref 6–8.5)
RBC # BLD AUTO: 6.01 10*6/MM3 (ref 4.14–5.8)
SODIUM SERPL-SCNC: 131 MMOL/L (ref 136–145)
TRIGL SERPL-MCNC: 175 MG/DL (ref 0–150)
TSH SERPL DL<=0.05 MIU/L-ACNC: 1.52 UIU/ML (ref 0.27–4.2)
VLDLC SERPL-MCNC: 30 MG/DL (ref 5–40)
WBC NRBC COR # BLD: 7.69 10*3/MM3 (ref 3.4–10.8)

## 2022-07-01 RX ORDER — DULAGLUTIDE 4.5 MG/.5ML
4.5 INJECTION, SOLUTION SUBCUTANEOUS WEEKLY
Qty: 2 ML | Refills: 2 | Status: SHIPPED | OUTPATIENT
Start: 2022-07-01 | End: 2022-07-26 | Stop reason: SDUPTHER

## 2022-07-01 RX ORDER — METFORMIN HYDROCHLORIDE 500 MG/1
1000 TABLET, EXTENDED RELEASE ORAL 2 TIMES DAILY WITH MEALS
Qty: 120 TABLET | Refills: 2 | Status: SHIPPED | OUTPATIENT
Start: 2022-07-01 | End: 2022-07-26 | Stop reason: SDUPTHER

## 2022-07-06 RX ORDER — MONTELUKAST SODIUM 10 MG/1
TABLET ORAL
Qty: 90 TABLET | Refills: 1 | Status: SHIPPED | OUTPATIENT
Start: 2022-07-06 | End: 2023-01-03

## 2022-07-22 RX ORDER — ATORVASTATIN CALCIUM 20 MG/1
TABLET, FILM COATED ORAL
Qty: 90 TABLET | Refills: 0 | Status: SHIPPED | OUTPATIENT
Start: 2022-07-22 | End: 2022-10-24

## 2022-07-26 DIAGNOSIS — E11.59 TYPE 2 DIABETES MELLITUS WITH OTHER CIRCULATORY COMPLICATION, WITHOUT LONG-TERM CURRENT USE OF INSULIN: Primary | ICD-10-CM

## 2022-07-26 RX ORDER — DULAGLUTIDE 4.5 MG/.5ML
4.5 INJECTION, SOLUTION SUBCUTANEOUS WEEKLY
Qty: 12 PEN | Refills: 0 | Status: SHIPPED | OUTPATIENT
Start: 2022-07-26 | End: 2022-07-26 | Stop reason: SDUPTHER

## 2022-07-26 RX ORDER — DULAGLUTIDE 4.5 MG/.5ML
4.5 INJECTION, SOLUTION SUBCUTANEOUS WEEKLY
Qty: 12 PEN | Refills: 0 | Status: SHIPPED | OUTPATIENT
Start: 2022-07-26 | End: 2022-11-29 | Stop reason: SDUPTHER

## 2022-07-26 RX ORDER — METFORMIN HYDROCHLORIDE 500 MG/1
1000 TABLET, EXTENDED RELEASE ORAL 2 TIMES DAILY WITH MEALS
Qty: 360 TABLET | Refills: 0 | Status: SHIPPED | OUTPATIENT
Start: 2022-07-26 | End: 2022-12-29 | Stop reason: SDUPTHER

## 2022-07-26 NOTE — TELEPHONE ENCOUNTER
Patient called in stating that he needs his trulicity refilled. Patient was given 2mL with 2 refills on 07/01/22. I advised patient of that and he stated the pharmacy stated that was not enough to get him through, so I informed patient that I would give him a call back I was going to call the pharmacy. I called the pharmacy spoke with Linda. She stated that he would need 12 pens to get him through, but the 2 mL with 2 refills was enough. She was not sure why he was calling. I went ahead and sent the refill over so that he has it already waiting when he goes to get his refills. Called patient to inform him that a 90 day supply was sent over to the pharmacy.

## 2022-08-16 ENCOUNTER — OFFICE VISIT (OUTPATIENT)
Dept: PODIATRY | Facility: CLINIC | Age: 62
End: 2022-08-16

## 2022-08-16 VITALS
SYSTOLIC BLOOD PRESSURE: 130 MMHG | BODY MASS INDEX: 34.27 KG/M2 | HEART RATE: 74 BPM | DIASTOLIC BLOOD PRESSURE: 83 MMHG | TEMPERATURE: 97.5 F | OXYGEN SATURATION: 98 % | WEIGHT: 253 LBS | HEIGHT: 72 IN

## 2022-08-16 DIAGNOSIS — E11.8 DIABETIC FOOT: ICD-10-CM

## 2022-08-16 DIAGNOSIS — E11.9 NON-INSULIN DEPENDENT TYPE 2 DIABETES MELLITUS: Primary | ICD-10-CM

## 2022-08-16 PROCEDURE — 99203 OFFICE O/P NEW LOW 30 MIN: CPT | Performed by: PODIATRIST

## 2022-08-16 NOTE — PROGRESS NOTES
[facility] - PODIATRY    Today's Date: 22    Patient Name: Dallas Hurt  MRN: [id number]  [facility]: [id number]  PCP: Ana Moreno MD, Last PCP Visit:  2022  Referring Provider: Aria Coffey*    SUBJECTIVE     Chief Complaint   Patient presents with   • Left Foot - Establish Care, Ingrown Toenail, Annual Exam, Diabetes   • Right Foot - Establish Care, Annual Exam, Diabetes     HPI: Dallas Hurt, a 62 y.o.male, presents to clinic for a diabetic foot evaluation.    New, Established, New Problem:  new    Onset: Insidious    Nature:  NIDDM    Stable, worsening, improving:  Improving    Patient controlling diabetes via:  Oral meds    Patient states there last blood glucose was:  180    Patient denies any fevers, chills, nausea, vomiting, shortness of breath, nor any other constitutional signs nor symptoms.    No other pedal complaints at this time.    Past Medical History:   Diagnosis Date   • Atrial fibrillation (HCC)    • Atrial flutter (HCC)    • Diabetes mellitus (HCC)    • Hyperlipidemia    • Ingrown toenail    • Low testosterone      Past Surgical History:   Procedure Laterality Date   • ABLATION OF DYSRHYTHMIC FOCUS     • COLONOSCOPY       Family History   Problem Relation Age of Onset   • Diabetes Other    • Diabetes Mother    • Atrial fibrillation Father    • Diabetes Father    • Cancer Father    • Atrial fibrillation Brother    • Hypertension Brother    • Aneurysm Paternal Grandmother    • No Known Problems Maternal Grandmother    • No Known Problems Maternal Grandfather    • No Known Problems Paternal Grandfather      Social History     Socioeconomic History   • Marital status:    Tobacco Use   • Smoking status: Former Smoker     Packs/day: 2.00     Years: 33.00     Pack years: 66.00     Types: Cigarettes     Quit date: 3/8/2008     Years since quittin.4   • Smokeless tobacco: Never Used   • Tobacco comment: CAFFEINE USE: 2 CUPS COFFEE DAILY/ 2 LITER COKE  ZERO DAILY   Vaping Use   • Vaping Use: Never used   Substance and Sexual Activity   • Alcohol use: Yes     Alcohol/week: 3.0 standard drinks     Types: 3 Shots of liquor per week   • Drug use: No   • Sexual activity: Defer     Allergies   Allergen Reactions   • Morphine And Related      Current Outpatient Medications   Medication Sig Dispense Refill   • Accu-Chek Softclix Lancets lancets 1 each by Other route 3 (Three) Times a Day. Use as instructed two to three times daily 100 each 2   • aspirin 81 MG EC tablet Take 81 mg by mouth Daily.     • atorvastatin (LIPITOR) 20 MG tablet Take 1 tablet by mouth once daily 90 tablet 0   • azithromycin (Zithromax Z-Jose D) 250 MG tablet Take 2 tablets by mouth on day 1, then 1 tablet daily on days 2-5 6 tablet 0   • benzonatate (Tessalon Perles) 100 MG capsule Take 1 capsule by mouth 3 (Three) Times a Day As Needed for Cough. 30 capsule 0   • benzoyl peroxide 10 % external wash      • betamethasone dipropionate (DIPROLENE) 0.05 % lotion Apply 0.5 application topically to the appropriate area as directed As Needed.     • cetirizine (zyrTEC) 10 MG tablet Take 10 mg by mouth Daily.     • chlorhexidine (Peridex) 0.12 % solution Apply 15 mL to the mouth or throat 2 (Two) Times a Day. Swish and spit 15 mls by mouth twice daily 118 mL 1   • cyclobenzaprine (FLEXERIL) 10 MG tablet Take 10 mg by mouth 3 (Three) Times a Day As Needed for muscle spasms.     • Dexilant 60 MG capsule Take 1 capsule by mouth Daily. 30 capsule 2   • diphenhydrAMINE (BENADRYL) 25 MG tablet Take 25 mg by mouth As Needed for Itching.     • docusate sodium (COLACE) 100 MG capsule Take 100 mg by mouth Daily.     • Dulaglutide (Trulicity) 4.5 MG/0.5ML solution pen-injector Inject 0.5 mL under the skin into the appropriate area as directed 1 (One) Time Per Week. 12 pen 0   • empagliflozin (Jardiance) 25 MG tablet tablet Take 1 tablet by mouth Daily. 90 tablet 1   • flecainide (TAMBOCOR) 50 MG tablet Take 1 tablet by  mouth twice daily 180 tablet 2   • fluconazole (DIFLUCAN) 150 MG tablet TAKE 1 TABLET BY MOUTH ONCE. REPEAT IN 3 DAYS 2 tablet 0   • glucose blood (Accu-Chek Kaylee Plus) test strip 1 each by Other route 3 (Three) Times a Day. Use as instructed two to three times daily 90 each 1   • glucose blood test strip Use as instructed 200 each 3   • glucose monitor monitoring kit 1 each 2 (Two) Times a Day As Needed (to test blood sugar). DX: E11.9 1 each 0   • ketoconazole (NIZORAL) 2 % cream APPLY TOPICALLY TO THE AFFECTED AREA(S) TWICE DAILY. 60 g 0   • ketoconazole (NIZORAL) 2 % shampoo SHAMPOO ONE TO TWO TIMES PER WEEK. LEAVE IN FOR 2 TO 3 MINUTES BEFORE RINSING.     • metFORMIN ER (GLUCOPHAGE-XR) 500 MG 24 hr tablet Take 2 tablets by mouth 2 (Two) Times a Day With Meals. 360 tablet 0   • montelukast (SINGULAIR) 10 MG tablet TAKE 1 TABLET BY MOUTH ONCE DAILY IN THE EVENING 90 tablet 1   • mupirocin (Bactroban) 2 % ointment Apply 1 application topically to the appropriate area as directed 3 (Three) Times a Day. 1 g 1   • naproxen sodium (ALEVE) 220 MG tablet Take 320 mg by mouth 2 (Two) Times a Day As Needed for Mild Pain . TAKING 2 TABS BID     • silver sulfadiazine (SSD) 1 % cream Apply  topically to the appropriate area as directed 2 (Two) Times a Day. 400 g 1   • tadalafil (CIALIS) 10 MG tablet Take 1 tablet by mouth once daily (Patient taking differently: 10 mg 2 (Two) Times a Day As Needed.) 90 tablet 0     No current facility-administered medications for this visit.     Review of Systems   Constitutional: Negative.    All other systems reviewed and are negative.      OBJECTIVE     Vitals:    08/16/22 1317   BP: 130/83   Pulse: 74   Temp: 97.5 °F (36.4 °C)   SpO2: 98%       Body mass index is 34.31 kg/m².    Lab Results   Component Value Date    HGBA1C 11.90 (H) 06/29/2022       Lab Results   Component Value Date    GLUCOSE 231 (H) 06/29/2022    CALCIUM 9.8 06/29/2022     (L) 06/29/2022    K 4.5 06/29/2022     CO2 20.0 (L) 06/29/2022    CL 98 06/29/2022    BUN 7 (L) 06/29/2022    CREATININE 0.83 06/29/2022    EGFRIFNONA 78 12/14/2021    BCR 8.4 06/29/2022    ANIONGAP 13.0 06/29/2022       Patient seen in no apparent distress.      PHYSICAL EXAM:     Foot/Ankle Exam:       General:   Diabetic Foot Exam Performed    Appearance: obesity    Orientation: AAOx3    Affect: appropriate    Gait: unimpaired    Shoe Gear:  Casual shoes    VASCULAR      Right Foot Vascularity   Normal vascular exam    Dorsalis pedis:  2+  Posterior tibial:  2+  Skin Temperature: warm    Edema Grading:  None  CFT:  < 3 seconds  Pedal Hair Growth:  Present  Varicosities: none       Left Foot Vascularity   Normal vascular exam    Dorsalis pedis:  2+  Posterior tibial:  2+  Skin Temperature: warm    Edema Grading:  None  CFT:  < 3 seconds  Pedal Hair Growth:  Present  Varicosities: none        NEUROLOGIC     Right Foot Neurologic   Normal sensation    Light touch sensation:  Normal  Vibratory sensation:  Normal  Hot/Cold sensation: normal    Protective Sensation using Valders-Marylu Monofilament:  10     Left Foot Neurologic   Normal sensation    Light touch sensation:  Normal  Vibratory sensation:  Normal  Hot/cold sensation: normal    Protective Sensation using Valders-Marylu Monofilament:  10     MUSCLE STRENGTH     Right Foot Muscle Strength   Foot dorsiflexion:  4  Foot plantar flexion:  4  Foot inversion:  4  Foot eversion:  4     Left Foot Muscle Strength   Foot dorsiflexion:  4  Foot plantar flexion:  4  Foot inversion:  4  Foot eversion:  4     RANGE OF MOTION      Right Foot Range of Motion   Foot and ankle ROM within normal limits       Left Foot Range of Motion   Foot and ankle ROM within normal limits       DERMATOLOGIC     Right Foot Dermatologic   Skin: skin intact    Nails: normal       Left Foot Dermatologic   Skin: skin intact    Nails: normal        Diabetic Foot Exam Performed      ASSESSMENT/PLAN     Diagnoses and all orders for  this visit:    1. Non-insulin dependent type 2 diabetes mellitus (HCC) (Primary)    2. Diabetic foot (HCC)        Comprehensive lower extremity examination and evaluation was performed.    Discussed findings and treatment plan including risks, benefits, and treatment options with patient in detail. Patient agreed with treatment plan.    Medications and allergies reviewed.  Reviewed available blood glucose and HgB A1C lab values along with other pertinent labs.  These were discussed with the patient as to their importance of diabetic maintenance.    Diabetic foot exam performed and documented this date, compliant with CQM required standards. Detail of findings as noted in physical exam.  Lower extremity Neurologic exam for diabetic patient performed and documented this date, compliant with PQRS required standards. Detail of findings as noted in physical exam.  Advised patient importance of good routine lower extremity hygiene. Advised patient importance of evaluating for intact skin and pain free nail borders.  Advised patient to use mirror to evaluate plantar/ soles of feet for better visualization. Advised patient monitor and phone office to be seen if any cracking to skin, open lesions, painful nail borders or if nails become elongated prior to next visit. Advised patient importance of daily cleansing of lower extremities, followed by good skin cream to maintain normal hydration of skin. Also advised patient importance of close daily monitoring of blood sugar. Advised to regulate diet and medications to maintain control of blood sugar in optimal range. Contact primary care provider if difficulties maintaining blood sugar levels.  Advised Patient of presence of Diabetes Mellitus condition.  Advised Patient risk of progression and worsening or improvement, then return of condition.  Will monitor condition for any change in future. Treat with most appropriate treatment pending status of condition.  Counseled and  advised patient extensively on nature and ramifications of diabetes. Standard instructions given to patient for good diabetic foot care and maintenance. Advised importance of careful monitoring to avoid break down and complications secondary to diabetes. Advised patient importance of strict maintenance of blood sugar control. Advised patient of possible ominous results from neglect of condition, i.e.: amputation/ loss of digits, feet and legs, or even death.  Patient states understands counseling, will monitor closely, continue good hygiene and routine diabetic foot care. Patient will contact office is questions or problems.      An After Visit Summary was printed and given to the patient at discharge, including (if requested) any available informative/educational handouts regarding diagnosis, treatment, or medications. All questions were answered to patient/family satisfaction. Should symptoms fail to improve or worsen they agree to call or return to clinic or to go to the Emergency Department. Discussed the importance of following up with any needed screening tests/labs/specialist appointments and any requested follow-up recommended by me today. Importance of maintaining follow-up discussed and patient accepts that missed appointments can delay diagnosis and potentially lead to worsening of conditions.    Return in about 1 year (around 8/16/2023) for Podiatry Diabetic Foot Exam., or sooner if acute issues arise.    This document has been electronically signed by Alonso Garcia DPM on August 16, 2022 13:41 EDT

## 2022-08-23 ENCOUNTER — TELEPHONE (OUTPATIENT)
Dept: INTERNAL MEDICINE | Facility: CLINIC | Age: 62
End: 2022-08-23

## 2022-08-23 NOTE — TELEPHONE ENCOUNTER
Red rule verified and correct.    Pt had an extraction on 8/15/22 and is having delayed healing.    The DDS wants to put him on a Zpak but the pt told them his A1c is too high.    Is there an issue with this?

## 2022-08-23 NOTE — TELEPHONE ENCOUNTER
Alan Huff APRN  to Me    AN      8/23/22 3:18 PM   He should not have any issues with a Z-Jose D, let him know that Z-Jose D is azithromycin, not to be confused with Medrol Dosepak, which is oral steroids, which would affect his A1c and blood sugar levels.

## 2022-09-19 RX ORDER — DEXLANSOPRAZOLE 60 MG/1
CAPSULE, DELAYED RELEASE ORAL
Qty: 30 CAPSULE | Refills: 0 | Status: SHIPPED | OUTPATIENT
Start: 2022-09-19 | End: 2022-12-29 | Stop reason: SDUPTHER

## 2022-09-20 RX ORDER — KETOCONAZOLE 20 MG/G
CREAM TOPICAL
Qty: 60 G | Refills: 0 | Status: SHIPPED | OUTPATIENT
Start: 2022-09-20 | End: 2022-11-11

## 2022-09-20 RX ORDER — TADALAFIL 10 MG/1
TABLET ORAL
Qty: 90 TABLET | Refills: 0 | Status: SHIPPED | OUTPATIENT
Start: 2022-09-20 | End: 2022-12-29 | Stop reason: SDUPTHER

## 2022-09-27 ENCOUNTER — TELEPHONE (OUTPATIENT)
Dept: INTERNAL MEDICINE | Facility: CLINIC | Age: 62
End: 2022-09-27

## 2022-09-27 DIAGNOSIS — E11.59 TYPE 2 DIABETES MELLITUS WITH OTHER CIRCULATORY COMPLICATION, WITHOUT LONG-TERM CURRENT USE OF INSULIN: Primary | ICD-10-CM

## 2022-10-04 NOTE — TELEPHONE ENCOUNTER
Patient called in today wanting to know if he can get these labs done the BMP is placed however the A1c is not he leaves town tomorrow but can come in today or Thursday to have them done. Order pended.

## 2022-10-06 ENCOUNTER — CLINICAL SUPPORT (OUTPATIENT)
Dept: INTERNAL MEDICINE | Facility: CLINIC | Age: 62
End: 2022-10-06

## 2022-10-06 DIAGNOSIS — E87.1 HYPONATREMIA: ICD-10-CM

## 2022-10-06 DIAGNOSIS — E78.5 HYPERLIPIDEMIA LDL GOAL <100: ICD-10-CM

## 2022-10-06 DIAGNOSIS — Z23 NEED FOR INFLUENZA VACCINATION: Primary | ICD-10-CM

## 2022-10-06 DIAGNOSIS — E11.59 TYPE 2 DIABETES MELLITUS WITH OTHER CIRCULATORY COMPLICATION, WITHOUT LONG-TERM CURRENT USE OF INSULIN: ICD-10-CM

## 2022-10-06 LAB
ANION GAP SERPL CALCULATED.3IONS-SCNC: 14.3 MMOL/L (ref 5–15)
BUN SERPL-MCNC: 15 MG/DL (ref 8–23)
BUN/CREAT SERPL: 11 (ref 7–25)
CALCIUM SPEC-SCNC: 10.3 MG/DL (ref 8.6–10.5)
CHLORIDE SERPL-SCNC: 97 MMOL/L (ref 98–107)
CO2 SERPL-SCNC: 22.7 MMOL/L (ref 22–29)
CREAT SERPL-MCNC: 1.36 MG/DL (ref 0.76–1.27)
EGFRCR SERPLBLD CKD-EPI 2021: 58.8 ML/MIN/1.73
GLUCOSE SERPL-MCNC: 278 MG/DL (ref 65–99)
HBA1C MFR BLD: 8.6 % (ref 4.8–5.6)
POTASSIUM SERPL-SCNC: 4.1 MMOL/L (ref 3.5–5.2)
SODIUM SERPL-SCNC: 134 MMOL/L (ref 136–145)

## 2022-10-06 PROCEDURE — 90686 IIV4 VACC NO PRSV 0.5 ML IM: CPT | Performed by: INTERNAL MEDICINE

## 2022-10-06 PROCEDURE — 36415 COLL VENOUS BLD VENIPUNCTURE: CPT | Performed by: INTERNAL MEDICINE

## 2022-10-06 PROCEDURE — 83036 HEMOGLOBIN GLYCOSYLATED A1C: CPT | Performed by: INTERNAL MEDICINE

## 2022-10-06 PROCEDURE — 80048 BASIC METABOLIC PNL TOTAL CA: CPT | Performed by: INTERNAL MEDICINE

## 2022-10-06 PROCEDURE — 90471 IMMUNIZATION ADMIN: CPT | Performed by: INTERNAL MEDICINE

## 2022-10-06 NOTE — TELEPHONE ENCOUNTER
Left vm letting patient know labs have been placed and that they can come in from mon-fri 8-4:30 to get them drawn.

## 2022-10-13 ENCOUNTER — TELEPHONE (OUTPATIENT)
Dept: INTERNAL MEDICINE | Facility: CLINIC | Age: 62
End: 2022-10-13

## 2022-10-13 NOTE — TELEPHONE ENCOUNTER
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

## 2022-10-24 RX ORDER — ATORVASTATIN CALCIUM 20 MG/1
TABLET, FILM COATED ORAL
Qty: 90 TABLET | Refills: 0 | Status: SHIPPED | OUTPATIENT
Start: 2022-10-24 | End: 2022-11-11

## 2022-11-11 RX ORDER — ATORVASTATIN CALCIUM 20 MG/1
TABLET, FILM COATED ORAL
Qty: 90 TABLET | Refills: 0 | Status: SHIPPED | OUTPATIENT
Start: 2022-11-11 | End: 2022-12-29 | Stop reason: SDUPTHER

## 2022-11-11 RX ORDER — KETOCONAZOLE 20 MG/G
CREAM TOPICAL
Qty: 60 G | Refills: 0 | Status: SHIPPED | OUTPATIENT
Start: 2022-11-11 | End: 2022-12-29 | Stop reason: SDUPTHER

## 2022-11-29 DIAGNOSIS — E11.59 TYPE 2 DIABETES MELLITUS WITH OTHER CIRCULATORY COMPLICATION, WITHOUT LONG-TERM CURRENT USE OF INSULIN: ICD-10-CM

## 2022-11-29 RX ORDER — DULAGLUTIDE 4.5 MG/.5ML
4.5 INJECTION, SOLUTION SUBCUTANEOUS WEEKLY
Qty: 6 ML | Refills: 2 | Status: SHIPPED | OUTPATIENT
Start: 2022-11-29 | End: 2022-12-13 | Stop reason: ALTCHOICE

## 2022-12-08 ENCOUNTER — TELEPHONE (OUTPATIENT)
Dept: INTERNAL MEDICINE | Facility: CLINIC | Age: 62
End: 2022-12-08

## 2022-12-08 DIAGNOSIS — E11.59 TYPE 2 DIABETES MELLITUS WITH OTHER CIRCULATORY COMPLICATION, WITHOUT LONG-TERM CURRENT USE OF INSULIN: ICD-10-CM

## 2022-12-08 NOTE — TELEPHONE ENCOUNTER
Can either call in the trulicity 3mg to [facility] or we can try the new mounjaro that is like trulicity, but a little bit better.  He would start on the 2.5mg weekly and increase levels each month.  Up to him.  The new medicine works the same as trulicity but also works on one more receptor so is a little stronger.  Biggest side effects are nausea and constipation.  Please call in whatever he prefers.

## 2022-12-08 NOTE — TELEPHONE ENCOUNTER
Caller: Dallas Hurt    Relationship: Self    Best call back number: [phone]    Requested Prescriptions:   Requested Prescriptions      No prescriptions requested or ordered in this encounter      [facility] 3.0 MG     Pharmacy where request should be sent: [facility] [address] [place] [address] [phone] [facility] [phone] FX     Additional details provided by patient: PATIENT STATED ALL PHARMACIES HE HAD REACHED OUT TO WERE OUT OF THE 4.5 MG BUT [facility] DID HAVE A 3 MODESTA SUPPLY OF THE 3.0 MG. PLEASE ADVISE     Does the patient have less than a 3 day supply:  [x] Yes  [] No    Would you like a call back once the refill request has been completed: [x] Yes [] No    If the office needs to give you a call back, can they leave a voicemail: [x] Yes [] No    Melba Martins Rep   12/08/22 09:10 EST

## 2022-12-09 NOTE — TELEPHONE ENCOUNTER
Pt would be willing to try mounjaro because he is desperate and need help now. Please send into [facility] and pt is requesting a 3Mo supply if possible because its cheaper with his ins. Please advise

## 2022-12-13 ENCOUNTER — TELEPHONE (OUTPATIENT)
Dept: INTERNAL MEDICINE | Facility: CLINIC | Age: 62
End: 2022-12-13

## 2022-12-13 RX ORDER — TIRZEPATIDE 2.5 MG/.5ML
2.5 INJECTION, SOLUTION SUBCUTANEOUS WEEKLY
Qty: 10 ML | Refills: 2 | Status: SHIPPED | OUTPATIENT
Start: 2022-12-13 | End: 2022-12-29

## 2022-12-13 RX ORDER — TIRZEPATIDE 2.5 MG/.5ML
2.5 INJECTION, SOLUTION SUBCUTANEOUS WEEKLY
Qty: 10 ML | Refills: 2 | Status: SHIPPED | OUTPATIENT
Start: 2022-12-13 | End: 2022-12-13

## 2022-12-15 ENCOUNTER — PRIOR AUTHORIZATION (OUTPATIENT)
Dept: INTERNAL MEDICINE | Facility: CLINIC | Age: 62
End: 2022-12-15

## 2022-12-29 ENCOUNTER — OFFICE VISIT (OUTPATIENT)
Dept: INTERNAL MEDICINE | Facility: CLINIC | Age: 62
End: 2022-12-29

## 2022-12-29 VITALS
SYSTOLIC BLOOD PRESSURE: 122 MMHG | HEART RATE: 81 BPM | BODY MASS INDEX: 35.21 KG/M2 | HEIGHT: 72 IN | DIASTOLIC BLOOD PRESSURE: 72 MMHG | WEIGHT: 260 LBS | TEMPERATURE: 97.8 F | OXYGEN SATURATION: 99 %

## 2022-12-29 DIAGNOSIS — N52.9 ERECTILE DYSFUNCTION, UNSPECIFIED ERECTILE DYSFUNCTION TYPE: ICD-10-CM

## 2022-12-29 DIAGNOSIS — E11.59 TYPE 2 DIABETES MELLITUS WITH OTHER CIRCULATORY COMPLICATION, WITHOUT LONG-TERM CURRENT USE OF INSULIN: Primary | ICD-10-CM

## 2022-12-29 DIAGNOSIS — J06.9 UPPER RESPIRATORY TRACT INFECTION, UNSPECIFIED TYPE: ICD-10-CM

## 2022-12-29 LAB — ALBUMIN UR-MCNC: 1.3 MG/DL

## 2022-12-29 PROCEDURE — 99214 OFFICE O/P EST MOD 30 MIN: CPT | Performed by: NURSE PRACTITIONER

## 2022-12-29 PROCEDURE — 82043 UR ALBUMIN QUANTITATIVE: CPT | Performed by: NURSE PRACTITIONER

## 2022-12-29 RX ORDER — DEXLANSOPRAZOLE 60 MG/1
1 CAPSULE, DELAYED RELEASE ORAL DAILY
Qty: 90 CAPSULE | Refills: 0 | Status: SHIPPED | OUTPATIENT
Start: 2022-12-29

## 2022-12-29 RX ORDER — METFORMIN HYDROCHLORIDE 500 MG/1
1000 TABLET, EXTENDED RELEASE ORAL 2 TIMES DAILY WITH MEALS
Qty: 360 TABLET | Refills: 0 | Status: SHIPPED | OUTPATIENT
Start: 2022-12-29

## 2022-12-29 RX ORDER — KETOCONAZOLE 20 MG/G
CREAM TOPICAL 2 TIMES DAILY PRN
Qty: 60 G | Refills: 0 | Status: SHIPPED | OUTPATIENT
Start: 2022-12-29 | End: 2023-02-06

## 2022-12-29 RX ORDER — BENZONATATE 100 MG/1
100 CAPSULE ORAL 3 TIMES DAILY PRN
Qty: 30 CAPSULE | Refills: 0 | Status: SHIPPED | OUTPATIENT
Start: 2022-12-29 | End: 2023-03-31

## 2022-12-29 RX ORDER — KETOCONAZOLE 20 MG/ML
SHAMPOO TOPICAL 2 TIMES WEEKLY
Qty: 120 ML | Refills: 2 | Status: SHIPPED | OUTPATIENT
Start: 2022-12-29

## 2022-12-29 RX ORDER — ATORVASTATIN CALCIUM 20 MG/1
20 TABLET, FILM COATED ORAL DAILY
Qty: 90 TABLET | Refills: 0 | Status: SHIPPED | OUTPATIENT
Start: 2022-12-29

## 2022-12-29 RX ORDER — TADALAFIL 10 MG/1
10 TABLET ORAL DAILY
Qty: 90 TABLET | Refills: 0 | Status: SHIPPED | OUTPATIENT
Start: 2022-12-29

## 2022-12-29 RX ORDER — FLUCONAZOLE 150 MG/1
150 TABLET ORAL ONCE
Qty: 1 TABLET | Refills: 0 | Status: SHIPPED | OUTPATIENT
Start: 2022-12-29 | End: 2022-12-29

## 2022-12-29 RX ORDER — BETAMETHASONE DIPROPIONATE 0.5 MG/G
LOTION TOPICAL 2 TIMES DAILY
Qty: 60 ML | Refills: 0 | Status: SHIPPED | OUTPATIENT
Start: 2022-12-29 | End: 2023-03-09 | Stop reason: SDUPTHER

## 2022-12-29 RX ORDER — DULAGLUTIDE 4.5 MG/.5ML
4.5 INJECTION, SOLUTION SUBCUTANEOUS WEEKLY
Qty: 8 ML | Refills: 1 | Status: SHIPPED | OUTPATIENT
Start: 2022-12-29 | End: 2023-03-31

## 2022-12-29 NOTE — PROGRESS NOTES
"Chief Complaint  Diabetes (6 month follow up )    Subjective          Dallas Hurt presents to [facility]  History of Present Illness  He has had issues getting mounjaro recently due to insurance coverage.  He has a letter today stating that insurance coverage excludes Jasmin until he is tried at least 3 other GLP-1's.  Was doing well on trulicity prior to Jasmin but the pharmacy had an issue with getting this    He has several concerns about medications and refills    ED-Previously tried viagra and had headaches  He reports improvement in symptoms with Cialis when taken daily  Urology had him only try this with sex but he states it does not work as well and he has had more issues with infections  He also reports some bph side effects when he is not taking cialis daily, difficulty starting stream, incomplete bladder emptying  Denies having chest pain, headache or flushing with use.  He reports taking a second 10 mg tab prior to sex which helps with ED    Objective   Vital Signs:   /72   Pulse 81   Temp 97.8 °F (36.6 °C) (Temporal)   Ht 182.9 cm (72\")   Wt 118 kg (260 lb)   SpO2 99%   BMI 35.26 kg/m²     Physical Exam  Vitals and nursing note reviewed.   Constitutional:       General: He is not in acute distress.     Appearance: Normal appearance.   HENT:      Head: Normocephalic and atraumatic.      Right Ear: External ear normal.      Left Ear: External ear normal.      Nose: Nose normal.      Mouth/Throat:      Mouth: Mucous membranes are moist.   Eyes:      Conjunctiva/sclera: Conjunctivae normal.   Cardiovascular:      Rate and Rhythm: Normal rate and regular rhythm.      Pulses: Normal pulses.      Heart sounds: Normal heart sounds. No murmur heard.    No friction rub. No gallop.   Pulmonary:      Effort: Pulmonary effort is normal. No respiratory distress.      Breath sounds: No wheezing, rhonchi or rales.   Musculoskeletal:      Cervical " back: Neck supple.      Right lower leg: No edema.      Left lower leg: No edema.   Skin:     General: Skin is warm and dry.   Neurological:      General: No focal deficit present.      Mental Status: He is alert and oriented to person, place, and time.   Psychiatric:         Mood and Affect: Mood normal.         Behavior: Behavior normal.        Result Review :          Procedures      Assessment and Plan    Diagnoses and all orders for this visit:    1. Type 2 diabetes mellitus with other circulatory complication, without long-term current use of insulin ([facility]) (Primary)  Comments:  Restarting Trulicity 4.5 mg.  Call to make sure [facility] has this in stock.  Restarting Trulicity at 4.5 mg dose.  Patient will call with concerns and f/u in 6 wks  Orders:  -     MicroAlbumin, Urine, Random - Urine, Clean Catch; Future  -     MicroAlbumin, Urine, Random - Urine, Clean Catch    2. Upper respiratory tract infection, unspecified type  Comments:  Reports resolved at this time but would like to have Tessalon Perles refilled in case he has return of symptoms while working  Orders:  -     benzonatate (TESSALON) 100 MG capsule; Take 1 capsule by mouth 3 (Three) Times a Day As Needed for Cough.  Dispense: 30 capsule; Refill: 0    3. Erectile dysfunction, unspecified erectile dysfunction type  Comments:  Discussed returning to Cialis 10 mg daily.  Patient agreeable with this plan.  He will call with chest pain, headache, flushing    Other orders  -     Dulaglutide (Trulicity) 4.5 MG/0.5ML solution pen-injector; Inject 0.5 mL under the skin into the appropriate area as directed 1 (One) Time Per Week.  Dispense: 8 mL; Refill: 1  -     ketoconazole (NIZORAL) 2 % shampoo; Apply  topically to the appropriate area as directed 2 (Two) Times a Week.  Dispense: 120 mL; Refill: 2  -     fluconazole (Diflucan) 150 MG tablet; Take 1 tablet by mouth 1 (One) Time for 1 dose.  Dispense: 1 tablet; Refill: 0  -     ketoconazole (NIZORAL) 2 % cream;  Apply  topically to the appropriate area as directed 2 (Two) Times a Day As Needed for Itching.  Dispense: 60 g; Refill: 0  -     metFORMIN ER (GLUCOPHAGE-XR) 500 MG 24 hr tablet; Take 2 tablets by mouth 2 (Two) Times a Day With Meals.  Dispense: 360 tablet; Refill: 0  -     Dexilant 60 MG capsule; Take 1 capsule by mouth Daily.  Dispense: 90 capsule; Refill: 0  -     atorvastatin (LIPITOR) 20 MG tablet; Take 1 tablet by mouth Daily.  Dispense: 90 tablet; Refill: 0  -     tadalafil (CIALIS) 10 MG tablet; Take 1 tablet by mouth Daily.  Dispense: 90 tablet; Refill: 0  -     betamethasone dipropionate 0.05 % lotion; Apply  topically to the appropriate area as directed 2 (Two) Times a Day.  Dispense: 60 mL; Refill: 0              Follow Up   Return for follow up in 6wks with Dr. Moreno.  Patient was given instructions and counseling regarding his condition or for health maintenance advice. Please see specific information pulled into the AVS if appropriate.

## 2022-12-31 DIAGNOSIS — E11.59 TYPE 2 DIABETES MELLITUS WITH OTHER CIRCULATORY COMPLICATION, WITHOUT LONG-TERM CURRENT USE OF INSULIN: ICD-10-CM

## 2023-01-03 RX ORDER — LANCETS
EACH MISCELLANEOUS
Qty: 100 EACH | Refills: 0 | Status: SHIPPED | OUTPATIENT
Start: 2023-01-03

## 2023-01-03 RX ORDER — BLOOD SUGAR DIAGNOSTIC
STRIP MISCELLANEOUS
Qty: 100 EACH | Refills: 0 | Status: SHIPPED | OUTPATIENT
Start: 2023-01-03

## 2023-01-03 RX ORDER — MONTELUKAST SODIUM 10 MG/1
TABLET ORAL
Qty: 90 TABLET | Refills: 0 | Status: SHIPPED | OUTPATIENT
Start: 2023-01-03 | End: 2023-03-31 | Stop reason: SDUPTHER

## 2023-01-06 ENCOUNTER — TELEPHONE (OUTPATIENT)
Dept: INTERNAL MEDICINE | Facility: CLINIC | Age: 63
End: 2023-01-06
Payer: COMMERCIAL

## 2023-01-06 NOTE — TELEPHONE ENCOUNTER
PA from [facility] for Shampoo prescription    Spoke with pharmacy and no PA should be needed. The pharmacist stated that the issue may be because the insurance is stating that it is too early too fill when that is wrong. It is saying that the earliest fill date is 1/23/23. Pharmacist stated that she will call the insurance and see if they will fill now. Left detailed message for patient about medication.    The patient does get the Shampoo and Cream so that is possible another issue that could be stopping the prescription to be filled.

## 2023-02-06 RX ORDER — KETOCONAZOLE 20 MG/G
CREAM TOPICAL
Qty: 60 G | Refills: 0 | Status: SHIPPED | OUTPATIENT
Start: 2023-02-06

## 2023-02-13 ENCOUNTER — OFFICE VISIT (OUTPATIENT)
Dept: INTERNAL MEDICINE | Facility: CLINIC | Age: 63
End: 2023-02-13
Payer: COMMERCIAL

## 2023-02-13 VITALS
WEIGHT: 254 LBS | RESPIRATION RATE: 18 BRPM | OXYGEN SATURATION: 97 % | DIASTOLIC BLOOD PRESSURE: 70 MMHG | TEMPERATURE: 97.6 F | BODY MASS INDEX: 34.4 KG/M2 | SYSTOLIC BLOOD PRESSURE: 122 MMHG | HEIGHT: 72 IN | HEART RATE: 73 BPM

## 2023-02-13 DIAGNOSIS — N52.9 ERECTILE DYSFUNCTION, UNSPECIFIED ERECTILE DYSFUNCTION TYPE: ICD-10-CM

## 2023-02-13 DIAGNOSIS — E66.09 CLASS 1 OBESITY DUE TO EXCESS CALORIES WITH SERIOUS COMORBIDITY AND BODY MASS INDEX (BMI) OF 34.0 TO 34.9 IN ADULT: ICD-10-CM

## 2023-02-13 DIAGNOSIS — M54.9 UPPER BACK PAIN: ICD-10-CM

## 2023-02-13 DIAGNOSIS — E11.9 TYPE 2 DIABETES MELLITUS WITHOUT COMPLICATION, WITHOUT LONG-TERM CURRENT USE OF INSULIN: Primary | ICD-10-CM

## 2023-02-13 LAB
BASOPHILS # BLD AUTO: 0.09 10*3/MM3 (ref 0–0.2)
BASOPHILS NFR BLD AUTO: 1.2 % (ref 0–1.5)
DEPRECATED RDW RBC AUTO: 38.4 FL (ref 37–54)
EOSINOPHIL # BLD AUTO: 0.11 10*3/MM3 (ref 0–0.4)
EOSINOPHIL NFR BLD AUTO: 1.4 % (ref 0.3–6.2)
ERYTHROCYTE [DISTWIDTH] IN BLOOD BY AUTOMATED COUNT: 12.4 % (ref 12.3–15.4)
HBA1C MFR BLD: 9.2 % (ref 4.8–5.6)
HCT VFR BLD AUTO: 49.5 % (ref 37.5–51)
HGB BLD-MCNC: 17.1 G/DL (ref 13–17.7)
IMM GRANULOCYTES # BLD AUTO: 0.03 10*3/MM3 (ref 0–0.05)
IMM GRANULOCYTES NFR BLD AUTO: 0.4 % (ref 0–0.5)
LYMPHOCYTES # BLD AUTO: 1.99 10*3/MM3 (ref 0.7–3.1)
LYMPHOCYTES NFR BLD AUTO: 26 % (ref 19.6–45.3)
MCH RBC QN AUTO: 29.6 PG (ref 26.6–33)
MCHC RBC AUTO-ENTMCNC: 34.5 G/DL (ref 31.5–35.7)
MCV RBC AUTO: 85.8 FL (ref 79–97)
MONOCYTES # BLD AUTO: 0.56 10*3/MM3 (ref 0.1–0.9)
MONOCYTES NFR BLD AUTO: 7.3 % (ref 5–12)
NEUTROPHILS NFR BLD AUTO: 4.86 10*3/MM3 (ref 1.7–7)
NEUTROPHILS NFR BLD AUTO: 63.7 % (ref 42.7–76)
NRBC BLD AUTO-RTO: 0 /100 WBC (ref 0–0.2)
PLATELET # BLD AUTO: 215 10*3/MM3 (ref 140–450)
PMV BLD AUTO: 10.6 FL (ref 6–12)
RBC # BLD AUTO: 5.77 10*6/MM3 (ref 4.14–5.8)
WBC NRBC COR # BLD: 7.64 10*3/MM3 (ref 3.4–10.8)

## 2023-02-13 PROCEDURE — 80061 LIPID PANEL: CPT | Performed by: NURSE PRACTITIONER

## 2023-02-13 PROCEDURE — 83036 HEMOGLOBIN GLYCOSYLATED A1C: CPT | Performed by: NURSE PRACTITIONER

## 2023-02-13 PROCEDURE — 80050 GENERAL HEALTH PANEL: CPT | Performed by: NURSE PRACTITIONER

## 2023-02-13 PROCEDURE — 99214 OFFICE O/P EST MOD 30 MIN: CPT | Performed by: NURSE PRACTITIONER

## 2023-02-13 PROCEDURE — 84403 ASSAY OF TOTAL TESTOSTERONE: CPT | Performed by: NURSE PRACTITIONER

## 2023-02-13 PROCEDURE — 84402 ASSAY OF FREE TESTOSTERONE: CPT | Performed by: NURSE PRACTITIONER

## 2023-02-13 RX ORDER — BUPROPION HYDROCHLORIDE 100 MG/1
100 TABLET, EXTENDED RELEASE ORAL 2 TIMES DAILY
Qty: 180 TABLET | Refills: 0 | Status: SHIPPED | OUTPATIENT
Start: 2023-02-13

## 2023-02-13 NOTE — PROGRESS NOTES
"Chief Complaint  Diabetes (6 week follow up ), Erectile Dysfunction, and Shoulder Pain (Pain between shoulders for 2-3 weeks)    Subjective          Dallas Hurt presents to [facility]  History of Present Illness  DM- back on trulicity. Doing well  No lows. BS fasting <200, trending down since back on trulicity    Pain in his middle upper back between should blades x2 wks--mostly on the right side. He reports spending a lot of time on social media and looking at a phone or tablet. He denies chest pain and sob. He denies injury. He reports pain will improve with massage in his upper middle back. Takes motrin prn for arthritis     ED-reports taking cialis daily still-urinary sx are improved with daily use. He has seen urology as does not wish to go back. He previously took testosterone and would like to restart this as he is still having issues with ED. Requesting labs today    Obesity-he is also concerned about his weight. He reports previously taking a medication that gave him energy and helped him to lose weight. Unsure of medication.   No hx of seizures  Objective   Vital Signs:   /70 (BP Location: Right arm, Patient Position: Sitting, Cuff Size: Adult)   Pulse 73   Temp 97.6 °F (36.4 °C)   Resp 18   Ht 182.9 cm (72\")   Wt 115 kg (254 lb)   SpO2 97%   BMI 34.45 kg/m²     Physical Exam  Vitals and nursing note reviewed.   Constitutional:       General: He is not in acute distress.     Appearance: Normal appearance.   HENT:      Head: Normocephalic and atraumatic.      Right Ear: External ear normal.      Left Ear: External ear normal.      Nose: Nose normal.      Mouth/Throat:      Mouth: Mucous membranes are moist.   Eyes:      Conjunctiva/sclera: Conjunctivae normal.   Cardiovascular:      Rate and Rhythm: Normal rate and regular rhythm.      Pulses: Normal pulses.      Heart sounds: Normal heart sounds. No murmur heard.    No friction rub. No gallop. "   Pulmonary:      Effort: Pulmonary effort is normal. No respiratory distress.      Breath sounds: No wheezing, rhonchi or rales.   Musculoskeletal:      Cervical back: Neck supple.      Thoracic back: Tenderness present. No spasms or bony tenderness. Normal range of motion.        Back:       Right lower leg: No edema.      Left lower leg: No edema.   Skin:     General: Skin is warm and dry.   Neurological:      General: No focal deficit present.      Mental Status: He is alert and oriented to person, place, and time.   Psychiatric:         Mood and Affect: Mood normal.         Behavior: Behavior normal.        Result Review :          Procedures      Assessment and Plan    Diagnoses and all orders for this visit:    1. Type 2 diabetes mellitus without complication, without long-term current use of insulin (HCC) (Primary)  Comments:  Expected poor control.  We will check labs in the office today.  Will review and adjust medications as needed.  Recently restarted Trulicity  Orders:  -     CBC & Differential  -     Comprehensive Metabolic Panel  -     Hemoglobin A1c  -     Lipid Panel  -     TSH    2. Erectile dysfunction, unspecified erectile dysfunction type  Comments:  Checking testosterone per patient request.Discussed need for repeat prior to 10 AM x2 if low.May discuss appropriateness of restarting supplement with PCP @ f/u  Orders:  -     Testosterone, Free, Total    3. Class 1 obesity due to excess calories with serious comorbidity and body mass index (BMI) of 34.0 to 34.9 in adult  Comments:  Encouraged diet modification and exercise.  Continue Trulicity and will start Wellbutrin twice daily    4. Upper back pain  Comments:  likely muscular. he will work on exercises and call if not improving    Other orders  -     buPROPion SR (Wellbutrin SR) 100 MG 12 hr tablet; Take 1 tablet by mouth 2 (Two) Times a Day.  Dispense: 180 tablet; Refill: 0              Follow Up   Return for schedule follow up with Dr. Moreno  in 6 wks.  Patient was given instructions and counseling regarding his condition or for health maintenance advice. Please see specific information pulled into the AVS if appropriate.

## 2023-02-14 LAB
ALBUMIN SERPL-MCNC: 4.7 G/DL (ref 3.5–5.2)
ALBUMIN/GLOB SERPL: 2 G/DL
ALP SERPL-CCNC: 56 U/L (ref 39–117)
ALT SERPL W P-5'-P-CCNC: 29 U/L (ref 1–41)
ANION GAP SERPL CALCULATED.3IONS-SCNC: 9.7 MMOL/L (ref 5–15)
AST SERPL-CCNC: 25 U/L (ref 1–40)
BILIRUB SERPL-MCNC: 0.4 MG/DL (ref 0–1.2)
BUN SERPL-MCNC: 9 MG/DL (ref 8–23)
BUN/CREAT SERPL: 9.5 (ref 7–25)
CALCIUM SPEC-SCNC: 10.1 MG/DL (ref 8.6–10.5)
CHLORIDE SERPL-SCNC: 100 MMOL/L (ref 98–107)
CHOLEST SERPL-MCNC: 150 MG/DL (ref 0–200)
CO2 SERPL-SCNC: 23.3 MMOL/L (ref 22–29)
CREAT SERPL-MCNC: 0.95 MG/DL (ref 0.76–1.27)
EGFRCR SERPLBLD CKD-EPI 2021: 90.5 ML/MIN/1.73
GLOBULIN UR ELPH-MCNC: 2.3 GM/DL
GLUCOSE SERPL-MCNC: 166 MG/DL (ref 65–99)
HDLC SERPL-MCNC: 49 MG/DL (ref 40–60)
LDLC SERPL CALC-MCNC: 73 MG/DL (ref 0–100)
LDLC/HDLC SERPL: 1.39 {RATIO}
POTASSIUM SERPL-SCNC: 4.5 MMOL/L (ref 3.5–5.2)
PROT SERPL-MCNC: 7 G/DL (ref 6–8.5)
SODIUM SERPL-SCNC: 133 MMOL/L (ref 136–145)
TRIGL SERPL-MCNC: 164 MG/DL (ref 0–150)
TSH SERPL DL<=0.05 MIU/L-ACNC: 1.29 UIU/ML (ref 0.27–4.2)
VLDLC SERPL-MCNC: 28 MG/DL (ref 5–40)

## 2023-02-19 LAB
TESTOST FREE SERPL-MCNC: 6.6 PG/ML (ref 6.6–18.1)
TESTOST SERPL-MCNC: 488 NG/DL (ref 264–916)

## 2023-03-09 RX ORDER — BETAMETHASONE DIPROPIONATE 0.5 MG/G
LOTION TOPICAL 2 TIMES DAILY
Qty: 60 ML | Refills: 0 | Status: SHIPPED | OUTPATIENT
Start: 2023-03-09

## 2023-03-09 NOTE — TELEPHONE ENCOUNTER
Caller: [facility] [address] [place] [address] [address] [phone] [place] [phone] FX    Relationship: Pharmacy    Best call back number: [phone]    Requested Prescriptions:   Requested Prescriptions     Pending Prescriptions Disp Refills   • betamethasone dipropionate 0.05 % lotion 60 mL 0     Sig: Apply  topically to the appropriate area as directed 2 (Two) Times a Day.        Pharmacy where request should be sent: [facility] [address] [place] [address] [address] [phone] [place] [phone] FX         Does the patient have less than a 3 day supply:  [x] Yes  [] No    Would you like a call back once the refill request has been completed: [x] Yes [] No    If the office needs to give you a call back, can they leave a voicemail: [x] Yes [] No    Melba Jalloh Rep   03/09/23 11:59 EST

## 2023-03-31 ENCOUNTER — OFFICE VISIT (OUTPATIENT)
Dept: INTERNAL MEDICINE | Facility: CLINIC | Age: 63
End: 2023-03-31
Payer: COMMERCIAL

## 2023-03-31 VITALS
BODY MASS INDEX: 33.86 KG/M2 | SYSTOLIC BLOOD PRESSURE: 138 MMHG | DIASTOLIC BLOOD PRESSURE: 68 MMHG | TEMPERATURE: 98.2 F | OXYGEN SATURATION: 96 % | HEART RATE: 85 BPM | HEIGHT: 72 IN | RESPIRATION RATE: 16 BRPM | WEIGHT: 250 LBS

## 2023-03-31 DIAGNOSIS — Z12.11 ENCOUNTER FOR SCREENING COLONOSCOPY: ICD-10-CM

## 2023-03-31 DIAGNOSIS — M25.511 CHRONIC RIGHT SHOULDER PAIN: ICD-10-CM

## 2023-03-31 DIAGNOSIS — E66.09 NON MORBID OBESITY DUE TO EXCESS CALORIES: ICD-10-CM

## 2023-03-31 DIAGNOSIS — G89.29 CHRONIC RIGHT SHOULDER PAIN: ICD-10-CM

## 2023-03-31 DIAGNOSIS — E78.5 HYPERLIPIDEMIA LDL GOAL <100: ICD-10-CM

## 2023-03-31 DIAGNOSIS — N52.9 ERECTILE DYSFUNCTION, UNSPECIFIED ERECTILE DYSFUNCTION TYPE: ICD-10-CM

## 2023-03-31 DIAGNOSIS — I48.0 PAROXYSMAL ATRIAL FIBRILLATION: ICD-10-CM

## 2023-03-31 DIAGNOSIS — M25.521 RIGHT ELBOW PAIN: ICD-10-CM

## 2023-03-31 DIAGNOSIS — R19.7 DIARRHEA, UNSPECIFIED TYPE: ICD-10-CM

## 2023-03-31 DIAGNOSIS — K59.00 CONSTIPATION, UNSPECIFIED CONSTIPATION TYPE: ICD-10-CM

## 2023-03-31 DIAGNOSIS — E11.59 TYPE 2 DIABETES MELLITUS WITH OTHER CIRCULATORY COMPLICATION, WITHOUT LONG-TERM CURRENT USE OF INSULIN: Primary | ICD-10-CM

## 2023-03-31 PROBLEM — J06.9 VIRAL URI WITH COUGH: Status: RESOLVED | Noted: 2022-02-02 | Resolved: 2023-03-31

## 2023-03-31 RX ORDER — LACTOBACILLUS ACIDOPHILUS 20B CELL
1 CAPSULE ORAL DAILY
Qty: 90 CAPSULE | Refills: 1 | Status: SHIPPED | OUTPATIENT
Start: 2023-03-31

## 2023-03-31 RX ORDER — FINASTERIDE 5 MG/1
5 TABLET, FILM COATED ORAL DAILY
Qty: 90 TABLET | Refills: 1 | Status: SHIPPED | OUTPATIENT
Start: 2023-03-31

## 2023-03-31 RX ORDER — SEMAGLUTIDE 1.34 MG/ML
1 INJECTION, SOLUTION SUBCUTANEOUS WEEKLY
Qty: 9 ML | Refills: 1 | Status: SHIPPED | OUTPATIENT
Start: 2023-03-31

## 2023-03-31 RX ORDER — KETOROLAC TROMETHAMINE 30 MG/ML
30 INJECTION, SOLUTION INTRAMUSCULAR; INTRAVENOUS EVERY 6 HOURS PRN
Status: SHIPPED | OUTPATIENT
Start: 2023-03-31 | End: 2023-04-05

## 2023-03-31 RX ORDER — MONTELUKAST SODIUM 10 MG/1
10 TABLET ORAL EVERY EVENING
Qty: 90 TABLET | Refills: 1 | Status: SHIPPED | OUTPATIENT
Start: 2023-03-31

## 2023-03-31 RX ORDER — POLYETHYLENE GLYCOL 3350 17 G/17G
17 POWDER, FOR SOLUTION ORAL DAILY
Qty: 100 EACH | Refills: 1 | Status: SHIPPED | OUTPATIENT
Start: 2023-03-31

## 2023-03-31 NOTE — PROGRESS NOTES
"Chief Complaint  Diabetes (Fasting  today), Back Pain, Shoulder Pain, elbow pain, and Constipation    Subjective          Dallas Hurt presents to [facility]  History of Present Illness     Sugar is running well recently  Has been on the road a lot  This am his sugar was 170  He is still having a little trouble with his weight, trying to lose his weight  States that he is trying to decrease his portion sizes    He is still having quite a bit of stomach upset    He feels like he is doing well on the wellbutrin  He has been losing weight and thinks that it might be helping  Maybe just a touch better on his wellbutrin    Has been having some constipation, but also having some diarrhea  Tried a suppository    Significant right shoulder pain  Trouble with starting [employer], etc due to pain    Was out working in his yard  Bird bath tumbled on his  Now with right elbow pain  Didn't hurt instantly  However but the end o fht enight it was really bothering him  Tired a brace  A little better this week    Objective   Vital Signs:   /68   Pulse 85   Temp 98.2 °F (36.8 °C)   Resp 16   Ht 182.9 cm (72\")   Wt 113 kg (250 lb)   SpO2 96%   BMI 33.91 kg/m²     Physical Exam  Vitals reviewed.   Constitutional:       Appearance: Normal appearance. He is well-developed.   HENT:      Head: Normocephalic and atraumatic.      Right Ear: External ear normal.      Left Ear: External ear normal.   Eyes:      Conjunctiva/sclera: Conjunctivae normal.      Pupils: Pupils are equal, round, and reactive to light.   Cardiovascular:      Rate and Rhythm: Normal rate and regular rhythm.      Heart sounds: No murmur heard.    No friction rub. No gallop.   Pulmonary:      Effort: Pulmonary effort is normal.      Breath sounds: Normal breath sounds. No wheezing or rhonchi.   Skin:     General: Skin is warm and dry.   Neurological:      Mental Status: He is alert and oriented to person, " place, and time.   Psychiatric:         Mood and Affect: Affect normal.         Behavior: Behavior normal.         Thought Content: Thought content normal.        Result Review :       Common labs    Common Labs 10/6/22 10/6/22 12/29/22 2/13/23 2/13/23 2/13/23 2/13/23    1529 1529  1139 1139 1139 1139   Glucose 278 (A)      166 (A)   BUN 15      9   Creatinine 1.36 (A)      0.95   Sodium 134 (A)      133 (A)   Potassium 4.1      4.5   Chloride 97 (A)      100   Calcium 10.3      10.1   Albumin       4.7   Total Bilirubin       0.4   Alkaline Phosphatase       56   AST (SGOT)       25   ALT (SGPT)       29   WBC     7.64     Hemoglobin     17.1     Hematocrit     49.5     Platelets     215     Total Cholesterol      150    Triglycerides      164 (A)    HDL Cholesterol      49    LDL Cholesterol       73    Hemoglobin A1C  8.60 (A)  9.20 (A)      Microalbumin, Urine   1.3       (A) Abnormal value              Results for orders placed or performed in visit on 02/13/23   Comprehensive Metabolic Panel    Specimen: Arm, Left; Blood   Result Value Ref Range    Glucose 166 (H) 65 - 99 mg/dL    BUN 9 8 - 23 mg/dL    Creatinine 0.95 0.76 - 1.27 mg/dL    Sodium 133 (L) 136 - 145 mmol/L    Potassium 4.5 3.5 - 5.2 mmol/L    Chloride 100 98 - 107 mmol/L    CO2 23.3 22.0 - 29.0 mmol/L    Calcium 10.1 8.6 - 10.5 mg/dL    Total Protein 7.0 6.0 - 8.5 g/dL    Albumin 4.7 3.5 - 5.2 g/dL    ALT (SGPT) 29 1 - 41 U/L    AST (SGOT) 25 1 - 40 U/L    Alkaline Phosphatase 56 39 - 117 U/L    Total Bilirubin 0.4 0.0 - 1.2 mg/dL    Globulin 2.3 gm/dL    A/G Ratio 2.0 g/dL    BUN/Creatinine Ratio 9.5 7.0 - 25.0    Anion Gap 9.7 5.0 - 15.0 mmol/L    eGFR 90.5 >60.0 mL/min/1.73   Hemoglobin A1c    Specimen: Arm, Left; Blood   Result Value Ref Range    Hemoglobin A1C 9.20 (H) 4.80 - 5.60 %   Lipid Panel    Specimen: Arm, Left; Blood   Result Value Ref Range    Total Cholesterol 150 0 - 200 mg/dL    Triglycerides 164 (H) 0 - 150 mg/dL    HDL  Cholesterol 49 40 - 60 mg/dL    LDL Cholesterol  73 0 - 100 mg/dL    VLDL Cholesterol 28 5 - 40 mg/dL    LDL/HDL Ratio 1.39    TSH    Specimen: Arm, Left; Blood   Result Value Ref Range    TSH 1.290 0.270 - 4.200 uIU/mL   Testosterone, Free, Total    Specimen: Arm, Left; Blood   Result Value Ref Range    Testosterone, Total 488 264 - 916 ng/dL    Testosterone, Free 6.6 6.6 - 18.1 pg/mL   CBC Auto Differential    Specimen: Arm, Left; Blood   Result Value Ref Range    WBC 7.64 3.40 - 10.80 10*3/mm3    RBC 5.77 4.14 - 5.80 10*6/mm3    Hemoglobin 17.1 13.0 - 17.7 g/dL    Hematocrit 49.5 37.5 - 51.0 %    MCV 85.8 79.0 - 97.0 fL    MCH 29.6 26.6 - 33.0 pg    MCHC 34.5 31.5 - 35.7 g/dL    RDW 12.4 12.3 - 15.4 %    RDW-SD 38.4 37.0 - 54.0 fl    MPV 10.6 6.0 - 12.0 fL    Platelets 215 140 - 450 10*3/mm3    Neutrophil % 63.7 42.7 - 76.0 %    Lymphocyte % 26.0 19.6 - 45.3 %    Monocyte % 7.3 5.0 - 12.0 %    Eosinophil % 1.4 0.3 - 6.2 %    Basophil % 1.2 0.0 - 1.5 %    Immature Grans % 0.4 0.0 - 0.5 %    Neutrophils, Absolute 4.86 1.70 - 7.00 10*3/mm3    Lymphocytes, Absolute 1.99 0.70 - 3.10 10*3/mm3    Monocytes, Absolute 0.56 0.10 - 0.90 10*3/mm3    Eosinophils, Absolute 0.11 0.00 - 0.40 10*3/mm3    Basophils, Absolute 0.09 0.00 - 0.20 10*3/mm3    Immature Grans, Absolute 0.03 0.00 - 0.05 10*3/mm3    nRBC 0.0 0.0 - 0.2 /100 WBC            Procedures        Assessment and Plan    Diagnoses and all orders for this visit:    1. Type 2 diabetes mellitus with other circulatory complication, without long-term current use of insulin (HCC) (Primary)  Comments:  will try switching to ozempic    2. Paroxysmal atrial fibrillation (HCC)  Comments:  stable, cont current meds    3. Hyperlipidemia LDL goal <100  Comments:  doing well    4. Non morbid obesity due to excess calories  Comments:  will see if ozempic helps    5. Encounter for screening colonoscopy  -     Ambulatory Referral to General Surgery    6. Constipation, unspecified  constipation type    7. Diarrhea, unspecified type  Comments:  Will try to come down on metformin as sugars are more well controlled    8. Chronic right shoulder pain  Comments:  X-ray today and proceed from there  Orders:  -     XR Shoulder 2+ View Right; Future  -     ketorolac (TORADOL) injection 30 mg    9. Right elbow pain  Comments:  Likely strain continue wearing brace will ice regularly Toradol shot today  Orders:  -     ketorolac (TORADOL) injection 30 mg    10. Erectile dysfunction, unspecified erectile dysfunction type  Comments:  Will try finasteride    Other orders  -     montelukast (SINGULAIR) 10 MG tablet; Take 1 tablet by mouth Every Evening.  Dispense: 90 tablet; Refill: 1  -     Semaglutide, 1 MG/DOSE, (Ozempic, 1 MG/DOSE,) 4 MG/3ML solution pen-injector; Inject 1 mg under the skin into the appropriate area as directed 1 (One) Time Per Week.  Dispense: 9 mL; Refill: 1  -     finasteride (PROSCAR) 5 MG tablet; Take 1 tablet by mouth Daily.  Dispense: 90 tablet; Refill: 1  -     Lactobacillus (Florajen Acidophilus) capsule; Take 1 tablet by mouth Daily.  Dispense: 90 capsule; Refill: 1  -     polyethylene glycol (MIRALAX) 17 g packet; Take 17 g by mouth Daily.  Dispense: 100 each; Refill: 1        BMI is >= 30 and <35. (Class 1 Obesity). The following options were offered after discussion;: exercise counseling/recommendations and nutrition counseling/recommendations            Follow Up   Return in about 6 weeks (around 5/12/2023).  Patient was given instructions and counseling regarding his condition or for health maintenance advice. Please see specific information pulled into the AVS if appropriate.

## 2023-04-04 ENCOUNTER — TELEPHONE (OUTPATIENT)
Dept: INTERNAL MEDICINE | Facility: CLINIC | Age: 63
End: 2023-04-04
Payer: COMMERCIAL

## 2023-04-04 NOTE — TELEPHONE ENCOUNTER
pharmacy  does not have the packets so I switched it to bottle same mg same dose  Still 6 month supply including refills. Only difference is  He will have to measure each dose out instead of it being in a package.

## 2023-04-05 ENCOUNTER — TELEPHONE (OUTPATIENT)
Dept: INTERNAL MEDICINE | Facility: CLINIC | Age: 63
End: 2023-04-05
Payer: COMMERCIAL

## 2023-04-05 NOTE — TELEPHONE ENCOUNTER
PT would like to discuss new medication and its side effects, pt is worried it will counter act his Cialis. PT is requesting a direct phone call from provider.

## 2023-04-10 ENCOUNTER — TELEPHONE (OUTPATIENT)
Dept: INTERNAL MEDICINE | Facility: CLINIC | Age: 63
End: 2023-04-10
Payer: COMMERCIAL

## 2023-04-10 NOTE — TELEPHONE ENCOUNTER
I think maybe he means the finasteride.  It is safe to use with the cialis and won't counteract anything.  I am on spring break this week.  If it is urgent maybe Sharla would be willing to call him.  If he has further questions I can contact him once I am back.

## 2023-04-10 NOTE — TELEPHONE ENCOUNTER
Caller: Dallas Hurt    Relationship: Self    Best call back number: [phone]    What is the best time to reach you: ANYTIME    Who are you requesting to speak with (clinical staff, provider,  specific staff member): CLINICAL    Do you know the name of the person who called: SHANNON    What was the call regarding: UNKNOWN    Do you require a callback: CALL BACK

## 2023-04-12 ENCOUNTER — OFFICE VISIT (OUTPATIENT)
Dept: INTERNAL MEDICINE | Facility: CLINIC | Age: 63
End: 2023-04-12
Payer: COMMERCIAL

## 2023-04-12 VITALS
OXYGEN SATURATION: 98 % | HEIGHT: 72 IN | HEART RATE: 77 BPM | SYSTOLIC BLOOD PRESSURE: 128 MMHG | RESPIRATION RATE: 16 BRPM | DIASTOLIC BLOOD PRESSURE: 82 MMHG | BODY MASS INDEX: 33.64 KG/M2 | WEIGHT: 248.4 LBS | TEMPERATURE: 98.1 F

## 2023-04-12 DIAGNOSIS — M25.511 CHRONIC RIGHT SHOULDER PAIN: Primary | ICD-10-CM

## 2023-04-12 DIAGNOSIS — G89.29 CHRONIC RIGHT SHOULDER PAIN: Primary | ICD-10-CM

## 2023-04-12 DIAGNOSIS — N52.9 ERECTILE DYSFUNCTION, UNSPECIFIED ERECTILE DYSFUNCTION TYPE: ICD-10-CM

## 2023-04-12 RX ORDER — METFORMIN HYDROCHLORIDE 500 MG/1
1000 TABLET, EXTENDED RELEASE ORAL 2 TIMES DAILY WITH MEALS
Qty: 360 TABLET | Refills: 0 | Status: SHIPPED | OUTPATIENT
Start: 2023-04-12

## 2023-04-12 RX ORDER — LIDOCAINE HYDROCHLORIDE 10 MG/ML
5 INJECTION, SOLUTION INFILTRATION; PERINEURAL
Status: COMPLETED | OUTPATIENT
Start: 2023-04-12 | End: 2023-04-12

## 2023-04-12 RX ORDER — CYCLOBENZAPRINE HCL 10 MG
10 TABLET ORAL 3 TIMES DAILY PRN
Qty: 90 TABLET | Refills: 0 | Status: SHIPPED | OUTPATIENT
Start: 2023-04-12

## 2023-04-12 RX ORDER — TRIAMCINOLONE ACETONIDE 40 MG/ML
80 INJECTION, SUSPENSION INTRA-ARTICULAR; INTRAMUSCULAR
Status: COMPLETED | OUTPATIENT
Start: 2023-04-12 | End: 2023-04-12

## 2023-04-12 RX ADMIN — TRIAMCINOLONE ACETONIDE 80 MG: 40 INJECTION, SUSPENSION INTRA-ARTICULAR; INTRAMUSCULAR at 15:03

## 2023-04-12 RX ADMIN — LIDOCAINE HYDROCHLORIDE 5 ML: 10 INJECTION, SOLUTION INFILTRATION; PERINEURAL at 15:03

## 2023-04-12 NOTE — PROGRESS NOTES
"Chief Complaint  Medication Problem (Pt states that he has questions about his finasteride 5mg that Dr. Moreno gave him on 3/31/23.) and Shoulder Pain (Wants steroid shot for his shoulder pain. )    Subjective       Dallas Hurt presents to [facility]    HPI   Wants to discuss what the Finasteride was prescribed for. Thought it was for ED, but when he read the drug info from pharmacy he didn't see anything about that.     Would like to have a shoulder injection as recommended by Dr. Moreno based on his xray results.     Objective     Vitals:    04/12/23 1304   BP: 128/82   BP Location: Left arm   Patient Position: Sitting   Cuff Size: Adult   Pulse: 77   Resp: 16   Temp: 98.1 °F (36.7 °C)   SpO2: 98%   Weight: 113 kg (248 lb 6.4 oz)   Height: 182.9 cm (72.01\")      Wt Readings from Last 3 Encounters:   04/12/23 113 kg (248 lb 6.4 oz)   03/31/23 113 kg (250 lb)   02/13/23 115 kg (254 lb)      BP Readings from Last 3 Encounters:   04/12/23 128/82   03/31/23 138/68   02/13/23 122/70        Body mass index is 33.68 kg/m².           Physical Exam  Vitals reviewed.   Constitutional:       Appearance: Normal appearance. He is well-developed.   HENT:      Head: Normocephalic and atraumatic.      Mouth/Throat:      Pharynx: No oropharyngeal exudate.   Eyes:      Conjunctiva/sclera: Conjunctivae normal.      Pupils: Pupils are equal, round, and reactive to light.   Cardiovascular:      Rate and Rhythm: Normal rate and regular rhythm.      Heart sounds: No murmur heard.    No friction rub. No gallop.   Pulmonary:      Effort: Pulmonary effort is normal.      Breath sounds: Normal breath sounds. No wheezing or rhonchi.   Skin:     General: Skin is warm and dry.   Neurological:      Mental Status: He is alert and oriented to person, place, and time.   Psychiatric:         Mood and Affect: Affect normal.          Result Review :   The following data was reviewed by: Aria" Ever Coffey MD on 04/12/2023:        Arthrocentesis    Date/Time: 4/12/2023 3:03 PM  Performed by: Aria Coffey MD  Authorized by: Aria Coffey MD   Indications: pain   Body area: shoulder  Joint: right subacromial bursa  Local anesthesia used: no    Anesthesia:  Local anesthesia used: no    Sedation:  Patient sedated: no    Needle size: 22 G  Ultrasound guidance: no  Approach: posterior  Aspirate amount: 0 mL  Meds administered: 80 mg triamcinolone acetonide 40 MG/ML; 5 mL lidocaine 1 %  Patient tolerance: patient tolerated the procedure well with no immediate complications          Assessment and Plan   Diagnoses and all orders for this visit:    1. Chronic right shoulder pain (Primary)  -     Arthrocentesis    2. Erectile dysfunction, unspecified erectile dysfunction type    Other orders  -     cyclobenzaprine (FLEXERIL) 10 MG tablet; Take 1 tablet by mouth 3 (Three) Times a Day As Needed for Muscle Spasms.  Dispense: 90 tablet; Refill: 0  -     metFORMIN ER (GLUCOPHAGE-XR) 500 MG 24 hr tablet; Take 2 tablets by mouth 2 (Two) Times a Day With Meals.  Dispense: 360 tablet; Refill: 0          Follow Up   Return for Keep previously scheduled follow up appointment, with Dr. Moreno.  Patient was given instructions and counseling regarding his condition or for health maintenance advice. Please see specific information pulled into the AVS if appropriate.

## 2023-04-13 DIAGNOSIS — E11.59 TYPE 2 DIABETES MELLITUS WITH OTHER CIRCULATORY COMPLICATION, WITHOUT LONG-TERM CURRENT USE OF INSULIN: ICD-10-CM

## 2023-04-14 RX ORDER — LANCETS
EACH MISCELLANEOUS
Qty: 100 EACH | Refills: 0 | Status: SHIPPED | OUTPATIENT
Start: 2023-04-14

## 2023-04-14 RX ORDER — BLOOD SUGAR DIAGNOSTIC
STRIP MISCELLANEOUS
Qty: 100 EACH | Refills: 0 | Status: SHIPPED | OUTPATIENT
Start: 2023-04-14

## 2023-04-19 ENCOUNTER — TELEPHONE (OUTPATIENT)
Dept: INTERNAL MEDICINE | Facility: CLINIC | Age: 63
End: 2023-04-19
Payer: COMMERCIAL

## 2023-04-19 NOTE — TELEPHONE ENCOUNTER
Pharmacy Name: [facility] [id number]  [place] - [address] [facility] - [phone] [facility] [phone]      Pharmacy representative phone number: [phone]    What medication are you calling in regards to: TEST STRIPS AND LANCETS    What question does the pharmacy have: NEEDING TO CLARIFY INSTRUCTIONS AS THEY BOTH ARE DIFFERENT    Who is the provider that prescribed the medication: REHAN

## 2023-04-19 NOTE — TELEPHONE ENCOUNTER
Caller: Dallas Hurt    Relationship: Self    Best call back number: [phone]    What medication are you requesting: ANTIBIOTIC     What are your current symptoms: PAIN IN KIDNEYS, FREQUENT URINATION, BURNING WHILE URINATING     How long have you been experiencing symptoms: 2 DAYS     Have you had these symptoms before:    [x] Yes  [] No    Have you been treated for these symptoms before:   [x] Yes  [] No    If a prescription is needed, what is your preferred pharmacy and phone number: [facility] [address] [place], [place] [id number] [phone] [facility] [phone]

## 2023-04-20 RX ORDER — DEXLANSOPRAZOLE 60 MG/1
1 CAPSULE, DELAYED RELEASE ORAL DAILY
Qty: 90 CAPSULE | Refills: 0 | Status: SHIPPED | OUTPATIENT
Start: 2023-04-20

## 2023-04-20 RX ORDER — CEPHALEXIN 500 MG/1
500 CAPSULE ORAL 2 TIMES DAILY
Qty: 10 CAPSULE | Refills: 0 | Status: SHIPPED | OUTPATIENT
Start: 2023-04-20 | End: 2023-04-25

## 2023-04-20 NOTE — TELEPHONE ENCOUNTER
I will call something in but if pain worsens or he has bleeding, fever, etc he needs to be seen.. really it's best if he is able to come to the office to leave a walk in urine sample prior to taking the medications.

## 2023-04-21 NOTE — TELEPHONE ENCOUNTER
Spoke with patient and made him aware that a medication would be sent in however, if symptoms worsen patient would need to be seen patient agreed.

## 2023-04-21 NOTE — TELEPHONE ENCOUNTER
This call was in  Regards to the patient wanting to speak directly with the provider I was calling patient to make him aware that his provider was out of the office on vacation.

## 2023-04-25 RX ORDER — SULFAMETHOXAZOLE AND TRIMETHOPRIM 800; 160 MG/1; MG/1
1 TABLET ORAL 2 TIMES DAILY
Qty: 10 TABLET | Refills: 0 | Status: SHIPPED | OUTPATIENT
Start: 2023-04-25 | End: 2023-04-30

## 2023-05-03 RX ORDER — FLECAINIDE ACETATE 50 MG/1
TABLET ORAL
Qty: 180 TABLET | Refills: 3 | Status: SHIPPED | OUTPATIENT
Start: 2023-05-03

## 2023-05-03 RX ORDER — KETOCONAZOLE 20 MG/ML
SHAMPOO TOPICAL
Qty: 120 ML | Refills: 0 | Status: SHIPPED | OUTPATIENT
Start: 2023-05-03

## 2023-05-03 RX ORDER — KETOCONAZOLE 20 MG/G
CREAM TOPICAL
Qty: 60 G | Refills: 0 | OUTPATIENT
Start: 2023-05-03

## 2023-05-05 ENCOUNTER — OFFICE VISIT (OUTPATIENT)
Dept: INTERNAL MEDICINE | Facility: CLINIC | Age: 63
End: 2023-05-05
Payer: COMMERCIAL

## 2023-05-05 VITALS
DIASTOLIC BLOOD PRESSURE: 68 MMHG | TEMPERATURE: 97.5 F | HEART RATE: 65 BPM | BODY MASS INDEX: 33.59 KG/M2 | OXYGEN SATURATION: 97 % | WEIGHT: 248 LBS | SYSTOLIC BLOOD PRESSURE: 122 MMHG | HEIGHT: 72 IN

## 2023-05-05 DIAGNOSIS — E11.59 TYPE 2 DIABETES MELLITUS WITH OTHER CIRCULATORY COMPLICATION, WITHOUT LONG-TERM CURRENT USE OF INSULIN: ICD-10-CM

## 2023-05-05 DIAGNOSIS — E78.5 HYPERLIPIDEMIA LDL GOAL <100: ICD-10-CM

## 2023-05-05 DIAGNOSIS — R21 RASH: Primary | ICD-10-CM

## 2023-05-05 DIAGNOSIS — L60.0 INGROWN TOENAIL: ICD-10-CM

## 2023-05-05 LAB
ALBUMIN SERPL-MCNC: 4.1 G/DL (ref 3.5–5.2)
ALBUMIN UR-MCNC: 2 MG/DL
ALBUMIN/GLOB SERPL: 1.7 G/DL
ALP SERPL-CCNC: 55 U/L (ref 39–117)
ALT SERPL W P-5'-P-CCNC: 27 U/L (ref 1–41)
ANION GAP SERPL CALCULATED.3IONS-SCNC: 11 MMOL/L (ref 5–15)
AST SERPL-CCNC: 18 U/L (ref 1–40)
BASOPHILS # BLD AUTO: 0.07 10*3/MM3 (ref 0–0.2)
BASOPHILS NFR BLD AUTO: 0.9 % (ref 0–1.5)
BILIRUB SERPL-MCNC: 0.4 MG/DL (ref 0–1.2)
BUN SERPL-MCNC: 9 MG/DL (ref 8–23)
BUN/CREAT SERPL: 9.1 (ref 7–25)
CALCIUM SPEC-SCNC: 9.1 MG/DL (ref 8.6–10.5)
CHLORIDE SERPL-SCNC: 99 MMOL/L (ref 98–107)
CHOLEST SERPL-MCNC: 157 MG/DL (ref 0–200)
CO2 SERPL-SCNC: 25 MMOL/L (ref 22–29)
CREAT SERPL-MCNC: 0.99 MG/DL (ref 0.76–1.27)
DEPRECATED RDW RBC AUTO: 40 FL (ref 37–54)
EGFRCR SERPLBLD CKD-EPI 2021: 86.1 ML/MIN/1.73
EOSINOPHIL # BLD AUTO: 0.19 10*3/MM3 (ref 0–0.4)
EOSINOPHIL NFR BLD AUTO: 2.5 % (ref 0.3–6.2)
ERYTHROCYTE [DISTWIDTH] IN BLOOD BY AUTOMATED COUNT: 12.9 % (ref 12.3–15.4)
GLOBULIN UR ELPH-MCNC: 2.4 GM/DL
GLUCOSE SERPL-MCNC: 178 MG/DL (ref 65–99)
HBA1C MFR BLD: 8.9 % (ref 4.8–5.6)
HCT VFR BLD AUTO: 46.1 % (ref 37.5–51)
HDLC SERPL-MCNC: 47 MG/DL (ref 40–60)
HGB BLD-MCNC: 15.9 G/DL (ref 13–17.7)
IMM GRANULOCYTES # BLD AUTO: 0.03 10*3/MM3 (ref 0–0.05)
IMM GRANULOCYTES NFR BLD AUTO: 0.4 % (ref 0–0.5)
LDLC SERPL CALC-MCNC: 74 MG/DL (ref 0–100)
LDLC/HDLC SERPL: 1.4 {RATIO}
LYMPHOCYTES # BLD AUTO: 1.97 10*3/MM3 (ref 0.7–3.1)
LYMPHOCYTES NFR BLD AUTO: 25.5 % (ref 19.6–45.3)
MCH RBC QN AUTO: 29.9 PG (ref 26.6–33)
MCHC RBC AUTO-ENTMCNC: 34.5 G/DL (ref 31.5–35.7)
MCV RBC AUTO: 86.8 FL (ref 79–97)
MONOCYTES # BLD AUTO: 0.68 10*3/MM3 (ref 0.1–0.9)
MONOCYTES NFR BLD AUTO: 8.8 % (ref 5–12)
NEUTROPHILS NFR BLD AUTO: 4.8 10*3/MM3 (ref 1.7–7)
NEUTROPHILS NFR BLD AUTO: 61.9 % (ref 42.7–76)
NRBC BLD AUTO-RTO: 0 /100 WBC (ref 0–0.2)
PLATELET # BLD AUTO: 210 10*3/MM3 (ref 140–450)
PMV BLD AUTO: 10.3 FL (ref 6–12)
POTASSIUM SERPL-SCNC: 4.3 MMOL/L (ref 3.5–5.2)
PROT SERPL-MCNC: 6.5 G/DL (ref 6–8.5)
RBC # BLD AUTO: 5.31 10*6/MM3 (ref 4.14–5.8)
SODIUM SERPL-SCNC: 135 MMOL/L (ref 136–145)
TRIGL SERPL-MCNC: 221 MG/DL (ref 0–150)
TSH SERPL DL<=0.05 MIU/L-ACNC: 1.62 UIU/ML (ref 0.27–4.2)
VLDLC SERPL-MCNC: 36 MG/DL (ref 5–40)
WBC NRBC COR # BLD: 7.74 10*3/MM3 (ref 3.4–10.8)

## 2023-05-05 PROCEDURE — 83036 HEMOGLOBIN GLYCOSYLATED A1C: CPT | Performed by: INTERNAL MEDICINE

## 2023-05-05 PROCEDURE — 80050 GENERAL HEALTH PANEL: CPT | Performed by: INTERNAL MEDICINE

## 2023-05-05 PROCEDURE — 80061 LIPID PANEL: CPT | Performed by: INTERNAL MEDICINE

## 2023-05-05 PROCEDURE — 82043 UR ALBUMIN QUANTITATIVE: CPT | Performed by: INTERNAL MEDICINE

## 2023-05-05 RX ORDER — VALACYCLOVIR HYDROCHLORIDE 1 G/1
1000 TABLET, FILM COATED ORAL 2 TIMES DAILY
Qty: 20 TABLET | Refills: 0 | Status: SHIPPED | OUTPATIENT
Start: 2023-05-05 | End: 2023-05-15

## 2023-05-05 RX ORDER — METFORMIN HYDROCHLORIDE 500 MG/1
500 TABLET, EXTENDED RELEASE ORAL 2 TIMES DAILY WITH MEALS
Qty: 360 TABLET | Refills: 0
Start: 2023-05-05

## 2023-05-05 RX ORDER — CLINDAMYCIN PHOSPHATE 10 MG/G
1 GEL TOPICAL 2 TIMES DAILY
Qty: 60 G | Refills: 0 | Status: SHIPPED | OUTPATIENT
Start: 2023-05-05

## 2023-05-05 RX ORDER — METHYLPREDNISOLONE 4 MG/1
TABLET ORAL
Qty: 21 TABLET | Refills: 0 | Status: SHIPPED | OUTPATIENT
Start: 2023-05-05

## 2023-05-05 NOTE — PROGRESS NOTES
"Chief Complaint  rash    Subjective          Dallas Hurt presents to [facility]  History of Present Illness     States that he developed a rash a few days ago on his legs  Did sit on a couch at a hotel, however hasn't slept in new beds etc  Hasn't been outside  But does have a dog  No new sheet exposures or bites of which he is aware    Itchiness on the right posterior buttock around L4-L5 dermatome    Doing great on ozempic  Was able to decrease the amount he is eating  Has lost quite a bit of weight    Has noticed some increased anxiety and jitteriness lately, but right now doesn't want to adjust meds    Objective   Vital Signs:   /68   Pulse 65   Temp 97.5 °F (36.4 °C)   Ht 182.9 cm (72\")   Wt 112 kg (248 lb)   SpO2 97%   BMI 33.63 kg/m²     Physical Exam  Vitals reviewed.   Constitutional:       Appearance: Normal appearance. He is well-developed.   HENT:      Head: Normocephalic and atraumatic.      Right Ear: External ear normal.      Left Ear: External ear normal.   Eyes:      Conjunctiva/sclera: Conjunctivae normal.      Pupils: Pupils are equal, round, and reactive to light.   Cardiovascular:      Rate and Rhythm: Normal rate and regular rhythm.      Heart sounds: No murmur heard.    No friction rub. No gallop.   Pulmonary:      Effort: Pulmonary effort is normal.      Breath sounds: Normal breath sounds. No wheezing or rhonchi.   Skin:     General: Skin is warm and dry.   Neurological:      Mental Status: He is alert and oriented to person, place, and time.   Psychiatric:         Mood and Affect: Affect normal.         Behavior: Behavior normal.         Thought Content: Thought content normal.        Result Review :       Common labs        10/6/2022    15:29 12/29/2022    12:39 2/13/2023    11:39   Common Labs   Glucose 278    166     BUN 15    9     Creatinine 1.36    0.95     Sodium 134    133     Potassium 4.1    4.5     Chloride 97    100  "    Calcium 10.3    10.1     Albumin   4.7     Total Bilirubin   0.4     Alkaline Phosphatase   56     AST (SGOT)   25     ALT (SGPT)   29     WBC   7.64     Hemoglobin   17.1     Hematocrit   49.5     Platelets   215     Total Cholesterol   150     Triglycerides   164     HDL Cholesterol   49     LDL Cholesterol    73     Hemoglobin A1C 8.60    9.20     Microalbumin, Urine  1.3          Results for orders placed or performed in visit on 02/13/23   Comprehensive Metabolic Panel    Specimen: Arm, Left; Blood   Result Value Ref Range    Glucose 166 (H) 65 - 99 mg/dL    BUN 9 8 - 23 mg/dL    Creatinine 0.95 0.76 - 1.27 mg/dL    Sodium 133 (L) 136 - 145 mmol/L    Potassium 4.5 3.5 - 5.2 mmol/L    Chloride 100 98 - 107 mmol/L    CO2 23.3 22.0 - 29.0 mmol/L    Calcium 10.1 8.6 - 10.5 mg/dL    Total Protein 7.0 6.0 - 8.5 g/dL    Albumin 4.7 3.5 - 5.2 g/dL    ALT (SGPT) 29 1 - 41 U/L    AST (SGOT) 25 1 - 40 U/L    Alkaline Phosphatase 56 39 - 117 U/L    Total Bilirubin 0.4 0.0 - 1.2 mg/dL    Globulin 2.3 gm/dL    A/G Ratio 2.0 g/dL    BUN/Creatinine Ratio 9.5 7.0 - 25.0    Anion Gap 9.7 5.0 - 15.0 mmol/L    eGFR 90.5 >60.0 mL/min/1.73   Hemoglobin A1c    Specimen: Arm, Left; Blood   Result Value Ref Range    Hemoglobin A1C 9.20 (H) 4.80 - 5.60 %   Lipid Panel    Specimen: Arm, Left; Blood   Result Value Ref Range    Total Cholesterol 150 0 - 200 mg/dL    Triglycerides 164 (H) 0 - 150 mg/dL    HDL Cholesterol 49 40 - 60 mg/dL    LDL Cholesterol  73 0 - 100 mg/dL    VLDL Cholesterol 28 5 - 40 mg/dL    LDL/HDL Ratio 1.39    TSH    Specimen: Arm, Left; Blood   Result Value Ref Range    TSH 1.290 0.270 - 4.200 uIU/mL   Testosterone, Free, Total    Specimen: Arm, Left; Blood   Result Value Ref Range    Testosterone, Total 488 264 - 916 ng/dL    Testosterone, Free 6.6 6.6 - 18.1 pg/mL   CBC Auto Differential    Specimen: Arm, Left; Blood   Result Value Ref Range    WBC 7.64 3.40 - 10.80 10*3/mm3    RBC 5.77 4.14 - 5.80 10*6/mm3     Hemoglobin 17.1 13.0 - 17.7 g/dL    Hematocrit 49.5 37.5 - 51.0 %    MCV 85.8 79.0 - 97.0 fL    MCH 29.6 26.6 - 33.0 pg    MCHC 34.5 31.5 - 35.7 g/dL    RDW 12.4 12.3 - 15.4 %    RDW-SD 38.4 37.0 - 54.0 fl    MPV 10.6 6.0 - 12.0 fL    Platelets 215 140 - 450 10*3/mm3    Neutrophil % 63.7 42.7 - 76.0 %    Lymphocyte % 26.0 19.6 - 45.3 %    Monocyte % 7.3 5.0 - 12.0 %    Eosinophil % 1.4 0.3 - 6.2 %    Basophil % 1.2 0.0 - 1.5 %    Immature Grans % 0.4 0.0 - 0.5 %    Neutrophils, Absolute 4.86 1.70 - 7.00 10*3/mm3    Lymphocytes, Absolute 1.99 0.70 - 3.10 10*3/mm3    Monocytes, Absolute 0.56 0.10 - 0.90 10*3/mm3    Eosinophils, Absolute 0.11 0.00 - 0.40 10*3/mm3    Basophils, Absolute 0.09 0.00 - 0.20 10*3/mm3    Immature Grans, Absolute 0.03 0.00 - 0.05 10*3/mm3    nRBC 0.0 0.0 - 0.2 /100 WBC            Procedures        Assessment and Plan    Diagnoses and all orders for this visit:    1. Rash (Primary)  Comments:  shingles appearing rash, not classic vesicular, but did get shingles vaccination; will treat with steroids and valtrex; no gabapentin as no severe pain    2. Ingrown toenail  Comments:  will send clinda cream per his request; he due it out and just wants an abx cream to apply to prevent infection    3. Type 2 diabetes mellitus with other circulatory complication, without long-term current use of insulin  Comments:  doing great on the ozempic; it has helped him adjust his diet; will decrease metformin to BID after steriods  Orders:  -     CBC & Differential  -     Comprehensive Metabolic Panel  -     Lipid Panel  -     TSH  -     MicroAlbumin, Urine, Random - Urine, Random Void  -     Hemoglobin A1c    4. Hyperlipidemia LDL goal <100  Comments:  stable, ciwll check labs  Orders:  -     CBC & Differential  -     Comprehensive Metabolic Panel  -     Lipid Panel  -     TSH  -     MicroAlbumin, Urine, Random - Urine, Random Void  -     Hemoglobin A1c    Other orders  -     methylPREDNISolone (MEDROL) 4 MG dose  pack; Take as directed on package instructions.  Dispense: 21 tablet; Refill: 0  -     valACYclovir (Valtrex) 1000 MG tablet; Take 1 tablet by mouth 2 (Two) Times a Day for 10 days.  Dispense: 20 tablet; Refill: 0  -     clindamycin (CLINDAGEL) 1 % gel; Apply 1 application topically to the appropriate area as directed 2 (Two) Times a Day.  Dispense: 60 g; Refill: 0  -     metFORMIN ER (GLUCOPHAGE-XR) 500 MG 24 hr tablet; Take 1 tablet by mouth 2 (Two) Times a Day With Meals.  Dispense: 360 tablet; Refill: 0        {BMI is >= 30 and <35. (Class 1 Obesity). The following options were offered after discussion;: exercise counseling/recommendations and nutrition counseling/recommendations            Follow Up   No follow-ups on file.  Patient was given instructions and counseling regarding his condition or for health maintenance advice. Please see specific information pulled into the AVS if appropriate.

## 2023-05-06 ENCOUNTER — DOCUMENTATION (OUTPATIENT)
Dept: INTERNAL MEDICINE | Facility: CLINIC | Age: 63
End: 2023-05-06
Payer: COMMERCIAL

## 2023-05-06 RX ORDER — KETOCONAZOLE 20 MG/G
CREAM TOPICAL DAILY
Qty: 60 G | Refills: 0 | Status: SHIPPED | OUTPATIENT
Start: 2023-05-06

## 2023-05-06 NOTE — PROGRESS NOTES
Mr. Dobson called concerned that his rash was potentially spreading onto his penis. He noticed 3 new bumps and said that he felt an odd sensation there. We discussed that if it is spreading there it would be less likely to be shingles however as I was not 100% convinced that the rash was shingles to begin with it is possible that it is still spreading for now he will use antifungal treatment for the penile rash and continue to monitor closely.

## 2023-05-22 ENCOUNTER — OFFICE VISIT (OUTPATIENT)
Dept: INTERNAL MEDICINE | Facility: CLINIC | Age: 63
End: 2023-05-22
Payer: COMMERCIAL

## 2023-05-22 VITALS
HEIGHT: 72 IN | RESPIRATION RATE: 18 BRPM | TEMPERATURE: 96.7 F | BODY MASS INDEX: 33.59 KG/M2 | DIASTOLIC BLOOD PRESSURE: 78 MMHG | WEIGHT: 248 LBS | SYSTOLIC BLOOD PRESSURE: 119 MMHG | OXYGEN SATURATION: 97 % | HEART RATE: 72 BPM

## 2023-05-22 DIAGNOSIS — B02.9 HERPES ZOSTER WITHOUT COMPLICATION: ICD-10-CM

## 2023-05-22 DIAGNOSIS — E11.59 TYPE 2 DIABETES MELLITUS WITH OTHER CIRCULATORY COMPLICATION, WITHOUT LONG-TERM CURRENT USE OF INSULIN: Primary | ICD-10-CM

## 2023-05-22 DIAGNOSIS — B37.9 YEAST INFECTION: ICD-10-CM

## 2023-05-22 DIAGNOSIS — F41.9 ANXIETY: ICD-10-CM

## 2023-05-22 PROCEDURE — 99214 OFFICE O/P EST MOD 30 MIN: CPT | Performed by: INTERNAL MEDICINE

## 2023-05-22 RX ORDER — BUPROPION HYDROCHLORIDE 100 MG/1
100 TABLET, EXTENDED RELEASE ORAL 2 TIMES DAILY
Qty: 180 TABLET | Refills: 0 | Status: SHIPPED | OUTPATIENT
Start: 2023-05-22

## 2023-05-22 RX ORDER — FLUCONAZOLE 100 MG/1
100 TABLET ORAL DAILY
Qty: 10 TABLET | Refills: 0 | Status: SHIPPED | OUTPATIENT
Start: 2023-05-22

## 2023-05-22 NOTE — PROGRESS NOTES
"Chief Complaint  Follow-up (6 week f/u-Shingles /Type 2 diabetes mellitus with other circulatory complication, without long-term current use of insulin/Patient would like to discuss medication.)    Subjective          Dallas Hurt presents to [facility]  History of Present Illness     Dm II-  Feels like ozmepic is helping  Has noticed bad gas since starting this  Sugars around 140-180  Was able to decrease metformin    Rash-  Shingles is much better  No severe pain, but has been itching    Still feeling stressed  Not as shaky  Would like to increase the wellbutrin back as it was helping  Not noticing any sleep issues        Objective   Vital Signs:   /78 (BP Location: Left arm, Patient Position: Sitting, Cuff Size: Adult)   Pulse 72   Temp 96.7 °F (35.9 °C) (Temporal)   Resp 18   Ht 182.9 cm (72\")   Wt 112 kg (248 lb)   SpO2 97%   BMI 33.63 kg/m²     Physical Exam  Vitals reviewed.   Constitutional:       Appearance: Normal appearance. He is well-developed.   HENT:      Head: Normocephalic and atraumatic.      Right Ear: External ear normal.      Left Ear: External ear normal.   Eyes:      Conjunctiva/sclera: Conjunctivae normal.      Pupils: Pupils are equal, round, and reactive to light.   Cardiovascular:      Rate and Rhythm: Normal rate and regular rhythm.      Heart sounds: No murmur heard.    No friction rub. No gallop.   Pulmonary:      Effort: Pulmonary effort is normal.      Breath sounds: Normal breath sounds. No wheezing or rhonchi.   Skin:     General: Skin is warm and dry.   Neurological:      Mental Status: He is alert and oriented to person, place, and time.   Psychiatric:         Mood and Affect: Affect normal.         Behavior: Behavior normal.         Thought Content: Thought content normal.        Result Review :       Common labs        12/29/2022    12:39 2/13/2023    11:39 5/5/2023    15:59   Common Labs   Glucose  166   178     BUN "  9   9     Creatinine  0.95   0.99     Sodium  133   135     Potassium  4.5   4.3     Chloride  100   99     Calcium  10.1   9.1     Albumin  4.7   4.1     Total Bilirubin  0.4   0.4     Alkaline Phosphatase  56   55     AST (SGOT)  25   18     ALT (SGPT)  29   27     WBC  7.64   7.74     Hemoglobin  17.1   15.9     Hematocrit  49.5   46.1     Platelets  215   210     Total Cholesterol  150   157     Triglycerides  164   221     HDL Cholesterol  49   47     LDL Cholesterol   73   74     Hemoglobin A1C  9.20   8.90     Microalbumin, Urine 1.3    2.0         Results for orders placed or performed in visit on 05/05/23   Comprehensive Metabolic Panel    Specimen: Arm, Left; Blood   Result Value Ref Range    Glucose 178 (H) 65 - 99 mg/dL    BUN 9 8 - 23 mg/dL    Creatinine 0.99 0.76 - 1.27 mg/dL    Sodium 135 (L) 136 - 145 mmol/L    Potassium 4.3 3.5 - 5.2 mmol/L    Chloride 99 98 - 107 mmol/L    CO2 25.0 22.0 - 29.0 mmol/L    Calcium 9.1 8.6 - 10.5 mg/dL    Total Protein 6.5 6.0 - 8.5 g/dL    Albumin 4.1 3.5 - 5.2 g/dL    ALT (SGPT) 27 1 - 41 U/L    AST (SGOT) 18 1 - 40 U/L    Alkaline Phosphatase 55 39 - 117 U/L    Total Bilirubin 0.4 0.0 - 1.2 mg/dL    Globulin 2.4 gm/dL    A/G Ratio 1.7 g/dL    BUN/Creatinine Ratio 9.1 7.0 - 25.0    Anion Gap 11.0 5.0 - 15.0 mmol/L    eGFR 86.1 >60.0 mL/min/1.73   Lipid Panel    Specimen: Arm, Left; Blood   Result Value Ref Range    Total Cholesterol 157 0 - 200 mg/dL    Triglycerides 221 (H) 0 - 150 mg/dL    HDL Cholesterol 47 40 - 60 mg/dL    LDL Cholesterol  74 0 - 100 mg/dL    VLDL Cholesterol 36 5 - 40 mg/dL    LDL/HDL Ratio 1.40    TSH    Specimen: Arm, Left; Blood   Result Value Ref Range    TSH 1.620 0.270 - 4.200 uIU/mL   MicroAlbumin, Urine, Random - Urine, Random Void    Specimen: Urine, Random Void   Result Value Ref Range    Microalbumin, Urine 2.0 mg/dL   Hemoglobin A1c    Specimen: Arm, Left; Blood   Result Value Ref Range    Hemoglobin A1C 8.90 (H) 4.80 - 5.60 %   CBC  Auto Differential    Specimen: Arm, Left; Blood   Result Value Ref Range    WBC 7.74 3.40 - 10.80 10*3/mm3    RBC 5.31 4.14 - 5.80 10*6/mm3    Hemoglobin 15.9 13.0 - 17.7 g/dL    Hematocrit 46.1 37.5 - 51.0 %    MCV 86.8 79.0 - 97.0 fL    MCH 29.9 26.6 - 33.0 pg    MCHC 34.5 31.5 - 35.7 g/dL    RDW 12.9 12.3 - 15.4 %    RDW-SD 40.0 37.0 - 54.0 fl    MPV 10.3 6.0 - 12.0 fL    Platelets 210 140 - 450 10*3/mm3    Neutrophil % 61.9 42.7 - 76.0 %    Lymphocyte % 25.5 19.6 - 45.3 %    Monocyte % 8.8 5.0 - 12.0 %    Eosinophil % 2.5 0.3 - 6.2 %    Basophil % 0.9 0.0 - 1.5 %    Immature Grans % 0.4 0.0 - 0.5 %    Neutrophils, Absolute 4.80 1.70 - 7.00 10*3/mm3    Lymphocytes, Absolute 1.97 0.70 - 3.10 10*3/mm3    Monocytes, Absolute 0.68 0.10 - 0.90 10*3/mm3    Eosinophils, Absolute 0.19 0.00 - 0.40 10*3/mm3    Basophils, Absolute 0.07 0.00 - 0.20 10*3/mm3    Immature Grans, Absolute 0.03 0.00 - 0.05 10*3/mm3    nRBC 0.0 0.0 - 0.2 /100 WBC            Procedures        Assessment and Plan    Diagnoses and all orders for this visit:    1. Type 2 diabetes mellitus with other circulatory complication, without long-term current use of insulin (Primary)  Comments:  increase ozempic to 2mg    2. Yeast infection  Comments:  wants to have diflucan on hand for as neede duse    3. Herpes zoster without complication  Comments:  almost totally resolved, doing well    4. Anxiety  Comments:  will increase the wellbutrin back to bid; he will montior for side effects    Other orders  -     Semaglutide, 2 MG/DOSE, (OZEMPIC) 8 MG/3ML solution pen-injector; Inject 2 mg under the skin into the appropriate area as directed 1 (One) Time Per Week.  Dispense: 3 mL; Refill: 2  -     buPROPion SR (Wellbutrin SR) 100 MG 12 hr tablet; Take 1 tablet by mouth 2 (Two) Times a Day.  Dispense: 180 tablet; Refill: 0  -     fluconazole (Diflucan) 100 MG tablet; Take 1 tablet by mouth Daily.  Dispense: 10 tablet; Refill: 0                Follow Up   Return in  about 6 weeks (around 7/3/2023).  Patient was given instructions and counseling regarding his condition or for health maintenance advice. Please see specific information pulled into the AVS if appropriate.

## 2023-05-24 RX ORDER — TADALAFIL 10 MG/1
TABLET ORAL
Qty: 90 TABLET | Refills: 0 | Status: SHIPPED | OUTPATIENT
Start: 2023-05-24

## 2023-08-10 RX ORDER — KETOCONAZOLE 20 MG/G
CREAM TOPICAL
Qty: 60 G | Refills: 0 | Status: SHIPPED | OUTPATIENT
Start: 2023-08-10

## 2023-08-14 RX ORDER — BUPROPION HYDROCHLORIDE 100 MG/1
TABLET, EXTENDED RELEASE ORAL
Qty: 180 TABLET | Refills: 0 | Status: SHIPPED | OUTPATIENT
Start: 2023-08-14

## 2023-08-22 RX ORDER — SEMAGLUTIDE 2.68 MG/ML
INJECTION, SOLUTION SUBCUTANEOUS
Qty: 9 ML | Refills: 0 | Status: SHIPPED | OUTPATIENT
Start: 2023-08-22

## 2023-08-22 RX ORDER — TADALAFIL 10 MG/1
TABLET ORAL
Qty: 90 TABLET | Refills: 0 | Status: SHIPPED | OUTPATIENT
Start: 2023-08-22

## 2023-08-22 RX ORDER — ATORVASTATIN CALCIUM 20 MG/1
TABLET, FILM COATED ORAL
Qty: 90 TABLET | Refills: 0 | Status: SHIPPED | OUTPATIENT
Start: 2023-08-22

## 2023-09-12 DIAGNOSIS — Z12.12 ENCOUNTER FOR COLORECTAL CANCER SCREENING: Primary | ICD-10-CM

## 2023-09-12 DIAGNOSIS — Z12.11 ENCOUNTER FOR COLORECTAL CANCER SCREENING: Primary | ICD-10-CM

## 2023-09-18 RX ORDER — KETOCONAZOLE 20 MG/G
CREAM TOPICAL
Qty: 60 G | Refills: 0 | Status: SHIPPED | OUTPATIENT
Start: 2023-09-18

## 2023-09-19 RX ORDER — KETOCONAZOLE 20 MG/ML
SHAMPOO TOPICAL
Qty: 120 ML | Refills: 0 | OUTPATIENT
Start: 2023-09-19

## 2023-09-19 RX ORDER — TADALAFIL 10 MG/1
TABLET ORAL
Qty: 90 TABLET | Refills: 0 | OUTPATIENT
Start: 2023-09-19

## 2023-09-19 RX ORDER — MONTELUKAST SODIUM 10 MG/1
TABLET ORAL
Qty: 90 TABLET | Refills: 0 | Status: SHIPPED | OUTPATIENT
Start: 2023-09-19

## 2023-09-19 RX ORDER — KETOCONAZOLE 20 MG/G
CREAM TOPICAL
Qty: 60 G | Refills: 0 | OUTPATIENT
Start: 2023-09-19

## 2023-09-19 RX ORDER — FLUCONAZOLE 100 MG/1
TABLET ORAL
Qty: 10 TABLET | Refills: 0 | Status: SHIPPED | OUTPATIENT
Start: 2023-09-19

## 2023-09-25 RX ORDER — KETOCONAZOLE 20 MG/ML
SHAMPOO TOPICAL
Qty: 120 ML | Refills: 0 | Status: SHIPPED | OUTPATIENT
Start: 2023-09-25

## 2023-10-10 RX ORDER — BETAMETHASONE DIPROPIONATE 0.5 MG/G
LOTION TOPICAL
Qty: 60 ML | Refills: 0 | Status: SHIPPED | OUTPATIENT
Start: 2023-10-10

## 2023-10-10 RX ORDER — TADALAFIL 10 MG/1
TABLET ORAL
Qty: 90 TABLET | Refills: 0 | Status: SHIPPED | OUTPATIENT
Start: 2023-10-10

## 2023-10-31 RX ORDER — BETAMETHASONE DIPROPIONATE 0.5 MG/G
LOTION TOPICAL
Qty: 60 ML | Refills: 0 | Status: SHIPPED | OUTPATIENT
Start: 2023-10-31

## 2023-11-06 RX ORDER — SEMAGLUTIDE 2.68 MG/ML
INJECTION, SOLUTION SUBCUTANEOUS
Qty: 9 ML | Refills: 0 | Status: SHIPPED | OUTPATIENT
Start: 2023-11-06

## 2023-12-05 ENCOUNTER — OFFICE VISIT (OUTPATIENT)
Dept: INTERNAL MEDICINE | Facility: CLINIC | Age: 63
End: 2023-12-05
Payer: COMMERCIAL

## 2023-12-05 VITALS
DIASTOLIC BLOOD PRESSURE: 80 MMHG | OXYGEN SATURATION: 97 % | BODY MASS INDEX: 32.78 KG/M2 | WEIGHT: 242 LBS | TEMPERATURE: 97.2 F | SYSTOLIC BLOOD PRESSURE: 132 MMHG | HEART RATE: 76 BPM | HEIGHT: 72 IN

## 2023-12-05 DIAGNOSIS — R91.1 LUNG NODULE: ICD-10-CM

## 2023-12-05 DIAGNOSIS — L60.3 NAIL BREAKING: ICD-10-CM

## 2023-12-05 DIAGNOSIS — Z87.891 PERSONAL HISTORY OF TOBACCO USE: ICD-10-CM

## 2023-12-05 DIAGNOSIS — E11.40 TYPE 2 DIABETES MELLITUS WITH DIABETIC NEUROPATHY, WITHOUT LONG-TERM CURRENT USE OF INSULIN: ICD-10-CM

## 2023-12-05 DIAGNOSIS — E78.5 HYPERLIPIDEMIA LDL GOAL <100: ICD-10-CM

## 2023-12-05 DIAGNOSIS — E11.59 TYPE 2 DIABETES MELLITUS WITH OTHER CIRCULATORY COMPLICATION, WITHOUT LONG-TERM CURRENT USE OF INSULIN: Primary | ICD-10-CM

## 2023-12-05 DIAGNOSIS — I48.0 PAROXYSMAL ATRIAL FIBRILLATION: ICD-10-CM

## 2023-12-05 LAB
25(OH)D3 SERPL-MCNC: 28.8 NG/ML (ref 30–100)
ALBUMIN SERPL-MCNC: 4.4 G/DL (ref 3.5–5.2)
ALBUMIN/GLOB SERPL: 1.6 G/DL
ALP SERPL-CCNC: 82 U/L (ref 39–117)
ALT SERPL W P-5'-P-CCNC: 50 U/L (ref 1–41)
ANION GAP SERPL CALCULATED.3IONS-SCNC: 12.2 MMOL/L (ref 5–15)
AST SERPL-CCNC: 28 U/L (ref 1–40)
BASOPHILS # BLD AUTO: 0.07 10*3/MM3 (ref 0–0.2)
BASOPHILS NFR BLD AUTO: 0.8 % (ref 0–1.5)
BILIRUB SERPL-MCNC: 0.5 MG/DL (ref 0–1.2)
BUN SERPL-MCNC: 12 MG/DL (ref 8–23)
BUN/CREAT SERPL: 11.8 (ref 7–25)
CALCIUM SPEC-SCNC: 10 MG/DL (ref 8.6–10.5)
CHLORIDE SERPL-SCNC: 100 MMOL/L (ref 98–107)
CHOLEST SERPL-MCNC: 179 MG/DL (ref 0–200)
CO2 SERPL-SCNC: 22.8 MMOL/L (ref 22–29)
CREAT SERPL-MCNC: 1.02 MG/DL (ref 0.76–1.27)
DEPRECATED RDW RBC AUTO: 39.1 FL (ref 37–54)
EGFRCR SERPLBLD CKD-EPI 2021: 82.6 ML/MIN/1.73
EOSINOPHIL # BLD AUTO: 0.18 10*3/MM3 (ref 0–0.4)
EOSINOPHIL NFR BLD AUTO: 2 % (ref 0.3–6.2)
ERYTHROCYTE [DISTWIDTH] IN BLOOD BY AUTOMATED COUNT: 12.3 % (ref 12.3–15.4)
FOLATE SERPL-MCNC: 18.4 NG/ML (ref 4.78–24.2)
GLOBULIN UR ELPH-MCNC: 2.8 GM/DL
GLUCOSE SERPL-MCNC: 250 MG/DL (ref 65–99)
HBA1C MFR BLD: 11.3 % (ref 4.8–5.6)
HCT VFR BLD AUTO: 53.2 % (ref 37.5–51)
HDLC SERPL-MCNC: 42 MG/DL (ref 40–60)
HGB BLD-MCNC: 18.3 G/DL (ref 13–17.7)
IMM GRANULOCYTES # BLD AUTO: 0.03 10*3/MM3 (ref 0–0.05)
IMM GRANULOCYTES NFR BLD AUTO: 0.3 % (ref 0–0.5)
IRON 24H UR-MRATE: 86 MCG/DL (ref 59–158)
IRON SATN MFR SERPL: 21 % (ref 20–50)
LDLC SERPL CALC-MCNC: 106 MG/DL (ref 0–100)
LDLC/HDLC SERPL: 2.42 {RATIO}
LYMPHOCYTES # BLD AUTO: 2.07 10*3/MM3 (ref 0.7–3.1)
LYMPHOCYTES NFR BLD AUTO: 23.5 % (ref 19.6–45.3)
MAGNESIUM SERPL-MCNC: 1.8 MG/DL (ref 1.6–2.4)
MCH RBC QN AUTO: 30.1 PG (ref 26.6–33)
MCHC RBC AUTO-ENTMCNC: 34.4 G/DL (ref 31.5–35.7)
MCV RBC AUTO: 87.5 FL (ref 79–97)
MONOCYTES # BLD AUTO: 0.67 10*3/MM3 (ref 0.1–0.9)
MONOCYTES NFR BLD AUTO: 7.6 % (ref 5–12)
NEUTROPHILS NFR BLD AUTO: 5.78 10*3/MM3 (ref 1.7–7)
NEUTROPHILS NFR BLD AUTO: 65.8 % (ref 42.7–76)
NRBC BLD AUTO-RTO: 0 /100 WBC (ref 0–0.2)
PLATELET # BLD AUTO: 215 10*3/MM3 (ref 140–450)
PMV BLD AUTO: 10.5 FL (ref 6–12)
POTASSIUM SERPL-SCNC: 4.6 MMOL/L (ref 3.5–5.2)
PROT SERPL-MCNC: 7.2 G/DL (ref 6–8.5)
RBC # BLD AUTO: 6.08 10*6/MM3 (ref 4.14–5.8)
SODIUM SERPL-SCNC: 135 MMOL/L (ref 136–145)
TIBC SERPL-MCNC: 410 MCG/DL (ref 298–536)
TRANSFERRIN SERPL-MCNC: 275 MG/DL (ref 200–360)
TRIGL SERPL-MCNC: 177 MG/DL (ref 0–150)
TSH SERPL DL<=0.05 MIU/L-ACNC: 1.39 UIU/ML (ref 0.27–4.2)
VIT B12 BLD-MCNC: 796 PG/ML (ref 211–946)
VLDLC SERPL-MCNC: 31 MG/DL (ref 5–40)
WBC NRBC COR # BLD AUTO: 8.8 10*3/MM3 (ref 3.4–10.8)

## 2023-12-05 PROCEDURE — 82746 ASSAY OF FOLIC ACID SERUM: CPT | Performed by: INTERNAL MEDICINE

## 2023-12-05 PROCEDURE — 83735 ASSAY OF MAGNESIUM: CPT | Performed by: INTERNAL MEDICINE

## 2023-12-05 PROCEDURE — 83036 HEMOGLOBIN GLYCOSYLATED A1C: CPT | Performed by: INTERNAL MEDICINE

## 2023-12-05 PROCEDURE — 82607 VITAMIN B-12: CPT | Performed by: INTERNAL MEDICINE

## 2023-12-05 PROCEDURE — 83540 ASSAY OF IRON: CPT | Performed by: INTERNAL MEDICINE

## 2023-12-05 PROCEDURE — 82306 VITAMIN D 25 HYDROXY: CPT | Performed by: INTERNAL MEDICINE

## 2023-12-05 PROCEDURE — 84466 ASSAY OF TRANSFERRIN: CPT | Performed by: INTERNAL MEDICINE

## 2023-12-05 PROCEDURE — 80061 LIPID PANEL: CPT | Performed by: INTERNAL MEDICINE

## 2023-12-05 PROCEDURE — 80050 GENERAL HEALTH PANEL: CPT | Performed by: INTERNAL MEDICINE

## 2023-12-05 RX ORDER — TADALAFIL 20 MG/1
20 TABLET ORAL DAILY PRN
Qty: 90 TABLET | Refills: 1 | Status: SHIPPED | OUTPATIENT
Start: 2023-12-05

## 2023-12-05 RX ORDER — TRIAMCINOLONE ACETONIDE 5 MG/G
1 OINTMENT TOPICAL 2 TIMES DAILY
Qty: 15 G | Refills: 0 | Status: SHIPPED | OUTPATIENT
Start: 2023-12-05

## 2023-12-05 NOTE — PROGRESS NOTES
"Chief Complaint  Diabetes, Mass, and Immunizations (Covid /)    Subjective      History of Present Illness  Dallas Hurt is a 63 y.o. 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    Wt Readings from Last 3 Encounters:   12/05/23 110 kg (242 lb)   06/28/23 108 kg (239 lb)   05/22/23 112 kg (248 lb)     BP Readings from Last 3 Encounters:   12/05/23 132/80   06/28/23 120/68   05/22/23 119/78       Health Maintenance   Topic Date Due    Pneumococcal Vaccine 0-64 (1 - PCV) Never done    ANNUAL PHYSICAL  Never done    DIABETIC EYE EXAM  Never done    TDAP/TD VACCINES (2 - Td or Tdap) 05/04/2021    DIABETIC FOOT EXAM  08/16/2023    URINE MICROALBUMIN  05/05/2024    BMI FOLLOWUP  05/05/2024    HEMOGLOBIN A1C  06/05/2024    LIPID PANEL  12/05/2024    COLORECTAL CANCER SCREENING  12/28/2026    HEPATITIS C SCREENING  Completed    COVID-19 Vaccine  Completed    INFLUENZA VACCINE  Completed    ZOSTER VACCINE  Completed       Physical Exam  Vitals reviewed.   Constitutional:       Appearance: Normal appearance. He is well-developed. He is obese.   HENT:      Head: Normocephalic and atraumatic.      Right Ear: External ear normal.      Left Ear: External ear normal.   Eyes:      Conjunctiva/sclera: Conjunctivae normal.      Pupils: Pupils are equal, round, and reactive to " light.   Cardiovascular:      Rate and Rhythm: Normal rate and regular rhythm.      Heart sounds: No murmur heard.     No friction rub. No gallop.   Pulmonary:      Effort: Pulmonary effort is normal.      Breath sounds: Normal breath sounds. No wheezing or rhonchi.   Skin:     General: Skin is warm and dry.   Neurological:      Mental Status: He is alert and oriented to person, place, and time.   Psychiatric:         Mood and Affect: Affect normal.         Behavior: Behavior normal.         Thought Content: Thought content normal.            Result Review :  The following data was reviewed by: Ana Moreno MD on 12/05/2023:      Procedures        Assessment and Plan   Diagnoses and all orders for this visit:    1. Type 2 diabetes mellitus with other circulatory complication, without long-term current use of insulin (Primary)  -     CBC & Differential  -     Comprehensive Metabolic Panel  -     Lipid Panel  -     TSH  -     Hemoglobin A1c  -     Vitamin D,25-Hydroxy  -     Iron Profile  -     Magnesium  -     Vitamin B12 & Folate    2. Paroxysmal atrial fibrillation  -     CBC & Differential  -     Comprehensive Metabolic Panel  -     Lipid Panel  -     TSH  -     Hemoglobin A1c  -     Vitamin D,25-Hydroxy  -     Iron Profile  -     Magnesium  -     Vitamin B12 & Folate    3. Hyperlipidemia LDL goal <100  -     CBC & Differential  -     Comprehensive Metabolic Panel  -     Lipid Panel  -     TSH  -     Hemoglobin A1c    4. Lung nodule  -      CT Chest Low Dose Cancer Screening WO; Future    5. Personal history of tobacco use  -      CT Chest Low Dose Cancer Screening WO; Future    6. Type 2 diabetes mellitus with diabetic neuropathy, without long-term current use of insulin    7. Nail breaking  -     Vitamin D,25-Hydroxy  -     Iron Profile  -     Magnesium  -     Vitamin B12 & Folate    Other orders  -     Tirzepatide (Mounjaro) 5 MG/0.5ML solution pen-injector; Inject 0.5 mL under the skin into the  appropriate area as directed 1 (One) Time Per Week.  Dispense: 2 mL; Refill: 1  -     triamcinolone (KENALOG) 0.5 % ointment; Apply 1 application  topically to the appropriate area as directed 2 (Two) Times a Day.  Dispense: 15 g; Refill: 0  -     tadalafil (CIALIS) 20 MG tablet; Take 1 tablet by mouth Daily As Needed for Erectile Dysfunction.  Dispense: 90 tablet; Refill: 1  -     COVID-19 F23 (Pfizer) 12yrs+ (COMIRNATY)         Will try mounjaro  Warned of side effects  Kenalog for skin rash  Will try increasing cilais and see if this helps  Ok with covid vaccine today  He will cont to monitor sugar and blood pressure closely and on a regular basis         FOLLOW UP  Return in about 3 months (around 3/5/2024).  Patient was given instructions and counseling regarding his condition or for health maintenance advice. Please see specific information pulled into the AVS if appropriate.       Ana Moreno MD  12/18/23  03:12 EST    CURRENT & DISCONTINUED MEDICATIONS  Current Outpatient Medications   Medication Instructions    Accu-Chek Guide test strip USE AS DIRECTED TO TEST BLOOD SUGAR TWICE DAILY AS NEEDED    Accu-Chek Softclix Lancets lancets USE AS DIRECTED THREE TIMES DAILY    atorvastatin (LIPITOR) 20 MG tablet Take 1 tablet by mouth once daily    benzonatate (TESSALON PERLES) 100 mg, Oral, 3 Times Daily PRN    betamethasone dipropionate 0.05 % lotion APPLY TOPICALLY TO THE APPROPRIATE AREA(S) AS DIRECTED TWO TIMES A DAY.    buPROPion SR (WELLBUTRIN SR) 100 MG 12 hr tablet Take 1 tablet by mouth twice daily    cetirizine (ZYRTEC) 10 mg, Oral, Daily    clindamycin (CLINDAGEL) 1 % gel 1 application , Topical, 2 Times Daily    cyclobenzaprine (FLEXERIL) 10 mg, Oral, 3 Times Daily PRN    docusate sodium (COLACE) 100 mg, Oral, Daily    flecainide (TAMBOCOR) 50 MG tablet Take 1 tablet by mouth twice daily    fluconazole (DIFLUCAN) 100 MG tablet Take 1 tablet by mouth once daily    glucose monitor monitoring kit 1  each, Does not apply, 2 Times Daily PRN, DX: E11.9    ketoconazole (NIZORAL) 2 % cream APPLY TO THE APPROPRIATE AREAS AS DIRECTED DAILY    ketoconazole (NIZORAL) 2 % shampoo APPLY TOPICALLY TO THE APPROPRIATE AREA AS DIRECTED TWO TIMES PER WEEK    montelukast (SINGULAIR) 10 MG tablet TAKE 1 TABLET BY MOUTH ONCE DAILY IN THE EVENING    naproxen sodium (ALEVE) 320 mg, Oral, 2 Times Daily PRN, TAKING 2 TABS BID    tadalafil (CIALIS) 20 mg, Oral, Daily PRN    Tirzepatide (MOUNJARO) 5 mg, Subcutaneous, Weekly    triamcinolone (KENALOG) 0.5 % ointment 1 application , Topical, 2 Times Daily       Medications Discontinued During This Encounter   Medication Reason    Dexilant 60 MG capsule     metFORMIN ER (GLUCOPHAGE-XR) 500 MG 24 hr tablet     mupirocin (Bactroban) 2 % ointment     Omega-3 Fatty Acids-Hemp Extra (Hemp MonoPure) capsule delayed-release     polyethylene glycol (MIRALAX) 17 g packet     Ozempic, 2 MG/DOSE, 8 MG/3ML solution pen-injector     tadalafil (CIALIS) 10 MG tablet Reorder

## 2023-12-06 ENCOUNTER — TELEPHONE (OUTPATIENT)
Dept: INTERNAL MEDICINE | Facility: CLINIC | Age: 63
End: 2023-12-06
Payer: COMMERCIAL

## 2023-12-06 NOTE — TELEPHONE ENCOUNTER
Called patient to give him his results. Patient stated that while he was in the appointment that he asked to get something for his cough. He stated that tessalon pearls were supposed to be called in.

## 2023-12-07 RX ORDER — BENZONATATE 100 MG/1
100 CAPSULE ORAL 3 TIMES DAILY PRN
Qty: 60 CAPSULE | Refills: 1 | Status: SHIPPED | OUTPATIENT
Start: 2023-12-07

## 2023-12-18 RX ORDER — BUPROPION HYDROCHLORIDE 100 MG/1
TABLET, EXTENDED RELEASE ORAL
Qty: 180 TABLET | Refills: 0 | Status: SHIPPED | OUTPATIENT
Start: 2023-12-18

## 2023-12-18 RX ORDER — ATORVASTATIN CALCIUM 20 MG/1
TABLET, FILM COATED ORAL
Qty: 90 TABLET | Refills: 0 | Status: SHIPPED | OUTPATIENT
Start: 2023-12-18

## 2023-12-18 RX ORDER — MONTELUKAST SODIUM 10 MG/1
TABLET ORAL
Qty: 90 TABLET | Refills: 0 | Status: SHIPPED | OUTPATIENT
Start: 2023-12-18

## 2023-12-21 ENCOUNTER — TELEPHONE (OUTPATIENT)
Dept: INTERNAL MEDICINE | Facility: CLINIC | Age: 63
End: 2023-12-21
Payer: COMMERCIAL

## 2023-12-21 RX ORDER — BLOOD SUGAR DIAGNOSTIC
STRIP MISCELLANEOUS
Qty: 100 EACH | Refills: 0 | Status: SHIPPED | OUTPATIENT
Start: 2023-12-21

## 2023-12-21 NOTE — TELEPHONE ENCOUNTER
Pt called into office today, pt stated today his sugar was 198, the past couple days he has been 240,240,240, Pt was wondering since his sugars are high if he should restart the metformin 500 mg 2 in the morning 2 in the evening, pt stated he has almost a 2 month supply of that. Pt stated with the mounjaro his stomach is feeling better, pt stated if provider wants to continue on the mounjaro then he would need a refill as he only has a week and a half of the medication. Pt also stated he is wanting to know what else he should do to get his sugars down, that's why he was curious if he should add his medication back in. I advised pt to not take any extra medications until I spoke with provider.

## 2023-12-21 NOTE — TELEPHONE ENCOUNTER
I think we should increase the mounjaro instead of restart the metformin, please confirm with him the dose of mounjaro he is taking.  I will send in the next highest dose for him.  I'm not sure if he is taking 2.5mg or 5mg right now.

## 2023-12-22 ENCOUNTER — HOSPITAL ENCOUNTER (OUTPATIENT)
Dept: CT IMAGING | Facility: HOSPITAL | Age: 63
Discharge: HOME | End: 2023-12-22
Admitting: INTERNAL MEDICINE
Payer: COMMERCIAL

## 2023-12-22 DIAGNOSIS — Z87.891 PERSONAL HISTORY OF TOBACCO USE: ICD-10-CM

## 2023-12-22 DIAGNOSIS — R91.1 LUNG NODULE: ICD-10-CM

## 2023-12-22 PROCEDURE — 71271 CT THORAX LUNG CANCER SCR C-: CPT

## 2024-01-18 ENCOUNTER — TELEPHONE (OUTPATIENT)
Dept: INTERNAL MEDICINE | Facility: CLINIC | Age: 64
End: 2024-01-18

## 2024-01-19 ENCOUNTER — TELEPHONE (OUTPATIENT)
Dept: INTERNAL MEDICINE | Facility: CLINIC | Age: 64
End: 2024-01-19
Payer: COMMERCIAL

## 2024-01-19 NOTE — TELEPHONE ENCOUNTER
"Pt called into clinic today about another mass that has appeared on the left lower side of the rib cage, Pt states it is tender when you push on it and the other two nodules appear to be bigger in size. I read pt results from the CT of the chest, but he said he wants to know \"what's growing in his chest\", I informed him about his medication and the increase, and that [facility]. I explained to pt I would put in a message to provider and see what providers thoughts are on the nodules are in his chest.   "

## 2024-01-19 NOTE — TELEPHONE ENCOUNTER
Called and spoke with pt, informed pt he needs a follow up apt to be evaluated. Offered to schedule pt apt for Monday, pt stated he has to go to [place] Monday and doesn't have his calendar in front of him so he will call back Monday and schedule an apt.

## 2024-01-19 NOTE — TELEPHONE ENCOUNTER
Unfortunately he needs to be seen for me to evaluate the chest lesions.  Can get him an appointment with anyone.

## 2024-01-22 ENCOUNTER — TELEPHONE (OUTPATIENT)
Dept: INTERNAL MEDICINE | Facility: CLINIC | Age: 64
End: 2024-01-22

## 2024-01-22 RX ORDER — BLOOD SUGAR DIAGNOSTIC
STRIP MISCELLANEOUS
Qty: 100 EACH | Refills: 0 | Status: SHIPPED | OUTPATIENT
Start: 2024-01-22

## 2024-01-22 NOTE — TELEPHONE ENCOUNTER
1) Plan is to increase each month unless he feels like sugars are way down or he has lost a ton of weight or he starts to have side effects.  Continue to push each month and when he gets to stable dose I am happy to prescribe for 3 months at a time.    2) Ct should have show if it was anything very concerning.  I will watch for a cancellation and can talk with Alan as needed.

## 2024-01-22 NOTE — TELEPHONE ENCOUNTER
Caller: Dallas Hurt    Relationship: Self    Best call back number: [phone]    What is the best time to reach you: ANYTIME     Who are you requesting to speak with (clinical staff, provider,  specific staff member): CLINICAL          What was the call regarding:       THE PATIENT SAID HE WAS CALLED AND TOLD TO CALL IN FOR AN APPOINTMENT FOR THE GROWTH ON HIS CHEST. THE PATIENT WAS SCHEDULE WITH PCP RICKY BUT IS WANTING TO BE SCHEDULED WITH PCP REHAN. THE PATIENT SAID HE WAS UPSET BECAUSE THINGS ARE NOT GETTING DONE LIKE IT IS SUPPOSED TO BE DONE      PLEASE CALL AND ADVSIE

## 2024-01-22 NOTE — TELEPHONE ENCOUNTER
"I spoke with patient and listened to his concerns.     1) he is curious about the Mounjaro and dosing. He has been gradually increasing dosage. If he gets it for a 3 month supply, it is cheaper for him. But, he cannot do that if the dosage will continue to gradually increase each month. He is ok with this, but just wants to know what the plan is. He has not had any side effects to date since switching to Mounjaro.     2) he has 3 palpable knots he is concerned with on his chest. He thought that the CT scan he had done would show something about these, but he still does not have any answers. He has an appt on Wednesday with Alan Huff to discuss. His concern is that Alan has never felt these previously and he feels like it's \"starting all over again\". I did let him know that I would voice his concerns to you.     I let him know that he's welcome to ask to speak to me anytime he is frustrated with processes so that I could be made aware and could try to help him out.   "

## 2024-01-23 ENCOUNTER — TELEPHONE (OUTPATIENT)
Dept: INTERNAL MEDICINE | Facility: CLINIC | Age: 64
End: 2024-01-23
Payer: COMMERCIAL

## 2024-01-24 ENCOUNTER — OFFICE VISIT (OUTPATIENT)
Dept: INTERNAL MEDICINE | Facility: CLINIC | Age: 64
End: 2024-01-24
Payer: COMMERCIAL

## 2024-01-24 ENCOUNTER — TELEPHONE (OUTPATIENT)
Dept: INTERNAL MEDICINE | Facility: CLINIC | Age: 64
End: 2024-01-24
Payer: COMMERCIAL

## 2024-01-24 VITALS
BODY MASS INDEX: 32.48 KG/M2 | TEMPERATURE: 97.2 F | HEIGHT: 72 IN | HEART RATE: 79 BPM | DIASTOLIC BLOOD PRESSURE: 68 MMHG | WEIGHT: 239.8 LBS | SYSTOLIC BLOOD PRESSURE: 132 MMHG | OXYGEN SATURATION: 95 %

## 2024-01-24 DIAGNOSIS — R22.2 CHEST MASS: ICD-10-CM

## 2024-01-24 DIAGNOSIS — K21.9 GASTROESOPHAGEAL REFLUX DISEASE WITHOUT ESOPHAGITIS: ICD-10-CM

## 2024-01-24 DIAGNOSIS — R05.3 CHRONIC COUGH: ICD-10-CM

## 2024-01-24 DIAGNOSIS — E11.40 TYPE 2 DIABETES MELLITUS WITH DIABETIC NEUROPATHY, WITHOUT LONG-TERM CURRENT USE OF INSULIN: Primary | ICD-10-CM

## 2024-01-24 DIAGNOSIS — E66.09 NON MORBID OBESITY DUE TO EXCESS CALORIES: ICD-10-CM

## 2024-01-24 DIAGNOSIS — D17.1 LIPOMA OF CHEST WALL: ICD-10-CM

## 2024-01-24 PROCEDURE — 99214 OFFICE O/P EST MOD 30 MIN: CPT | Performed by: NURSE PRACTITIONER

## 2024-01-24 RX ORDER — EMPAGLIFLOZIN AND METFORMIN HYDROCHLORIDE 12.5; 5 MG/1; MG/1
1 TABLET ORAL
Qty: 180 TABLET | Refills: 0 | Status: SHIPPED | OUTPATIENT
Start: 2024-01-24

## 2024-01-24 RX ORDER — BENZONATATE 100 MG/1
100 CAPSULE ORAL 3 TIMES DAILY PRN
Qty: 60 CAPSULE | Refills: 1 | Status: SHIPPED | OUTPATIENT
Start: 2024-01-24

## 2024-01-24 RX ORDER — DEXLANSOPRAZOLE 60 MG/1
60 CAPSULE, DELAYED RELEASE ORAL DAILY
Qty: 90 CAPSULE | Refills: 0 | Status: SHIPPED | OUTPATIENT
Start: 2024-01-24

## 2024-01-24 NOTE — TELEPHONE ENCOUNTER
PA was approved for mounjaro. Pt was texted of approval by [employer].     Your PA request has been approved. Additional information will be provided in the approval communication. (Message 1144)

## 2024-01-24 NOTE — PROGRESS NOTES
"Chief Complaint  Chest Injury (Chest lesion )    Subjective        Dallas Hurt presents to [facility] for follow-up for chest mass and diabetes.    Patient is reporting some abnormal masses to his chest.,  One is located at the top of his sternum, he reports has been there for several years, he has not erythematous, sore.  He also has 1 at the bottom of his sternum at the xiphoid process, he reports this has been growing over time, he just wanted to have it evaluated.  States if you do push significant enough it is tender.  He also has a small lump on his left lower chest, below the breast line.  This is new to him, about pea-sized.  Not significantly painful.  Recently had CT chest done, and wanted to discuss if there was any notation on these masses.    Also following up on his type 2 diabetes, he has been on Mounjaro, he was started on 5 mg he is up to the 7.5 mg dose, just recently sent a 10 mg dose into his pharmacy.  He wanted to understand the titration of this medication, it is better for him to get 90-day supplies if he can from a cost perspective.  His A1c was checked in December, greater than 11, he is actively working on bringing this down.  He has had significant amount of weight loss over the last 12 months, is trying to eat somewhat better, admits to some unhealthy eating from time to time.    Objective   Vital Signs:   /68 (BP Location: Left arm, Patient Position: Sitting, Cuff Size: Adult)   Pulse 79   Temp 97.2 °F (36.2 °C) (Temporal)   Ht 182.9 cm (72.01\")   Wt 109 kg (239 lb 12.8 oz)   SpO2 95%   BMI 32.52 kg/m²     Physical Exam  Vitals and nursing note reviewed.   Constitutional:       Appearance: Normal appearance. He is obese.   Cardiovascular:      Rate and Rhythm: Normal rate.      Comments: Hard mass located to the top of the sternum, not painful, no redness.    Hard mass located at the tip of the xiphoid process, not painful, no redness.    Small " lump noted to the left lower chest, below breast line, pea-sized, consistent with lipoma  Pulmonary:      Effort: Pulmonary effort is normal.   Neurological:      Mental Status: He is alert.   Psychiatric:         Mood and Affect: Mood normal.        Result Review :  {The following data was reviewed by VENITA Bates on 01/24/24                Diagnoses and all orders for this visit:    1. Type 2 diabetes mellitus with diabetic neuropathy, without long-term current use of insulin (Primary)  -     Empagliflozin-metFORMIN HCl (Synjardy) 12.5-500 MG tablet; Take 1 tablet by mouth 2 (Two) Times a Day Before Meals.  Dispense: 180 tablet; Refill: 0  -     Tirzepatide (MOUNJARO) 12.5 MG/0.5ML solution pen-injector pen; Inject 0.5 mL under the skin into the appropriate area as directed 1 (One) Time Per Week.  Dispense: 6 mL; Refill: 0    2. Non morbid obesity due to excess calories    3. Chest mass  -     XR Chest PA & Lateral (In Office)    4. Chronic cough  -     benzonatate (Tessalon Perles) 100 MG capsule; Take 1 capsule by mouth 3 (Three) Times a Day As Needed for Cough.  Dispense: 60 capsule; Refill: 1  -     XR Chest PA & Lateral (In Office)    5. Gastroesophageal reflux disease without esophagitis  -     dexlansoprazole (Dexilant) 60 MG capsule; Take 1 capsule by mouth Daily.  Dispense: 90 capsule; Refill: 0    6. Lipoma of chest wall    For patient's diabetes, we discussed his elevation in A1c, and his current changes, A1c was 11, unlikely that Mounjaro alone would bring that down.  He was open to restarting Synjardy, we will start him back at the lower dose twice daily, and with the increase in Mounjaro, hopefully we can get much closer to goal.  He will continue to monitor diet, activity.  Monitor weight.    GERD, needed a refill on his Dexilant, sent today.    Lipoma, continue to monitor, if any increasing in size, would likely ultrasound to be certain.    His other chest masses are not overly concerning, we  will go ahead and get a chest x-ray and follow-up based on those results.    Chronic cough, he uses Tessalon, needed refills today, sent to pharmacy.    Should maintain his follow-up with his PCP in March as previously scheduled.      Follow Up   No follow-ups on file.  Patient was given instructions and counseling regarding his condition or for health maintenance advice. Please see specific information pulled into the AVS if appropriate.     Alan Huff, APRN  1/24/2024  This note was electronically signed.

## 2024-01-25 NOTE — TELEPHONE ENCOUNTER
I spoke with the patient and the information was given to him at his appointment yesterday with Alan NATHAN. Hw did state that he forgot to see if he could get something for his arthritis because the aleve is not helping anymore. Also he was wanting to see if he could get a joint injection for his right shoulder as he had gotten one in the past and it seemed to help,

## 2024-01-26 ENCOUNTER — TELEPHONE (OUTPATIENT)
Dept: INTERNAL MEDICINE | Facility: CLINIC | Age: 64
End: 2024-01-26
Payer: COMMERCIAL

## 2024-01-31 RX ORDER — ACETAMINOPHEN 500 MG
500 TABLET ORAL EVERY 6 HOURS PRN
Qty: 90 TABLET | Refills: 1 | Status: SHIPPED | OUTPATIENT
Start: 2024-01-31

## 2024-01-31 RX ORDER — IBUPROFEN 600 MG/1
600 TABLET ORAL EVERY 6 HOURS PRN
Qty: 90 TABLET | Refills: 1 | Status: SHIPPED | OUTPATIENT
Start: 2024-01-31

## 2024-01-31 NOTE — TELEPHONE ENCOUNTER
Called and spoke with pt, informed him provider has sent in medications to [facility], educated pt per provider on how to take those medications, offered an apt for shoulder injection, pt stated he is short on time, went to look for an apt and wasn't able to find anything that works with his schedule, pt stated he will wait for the shoulder injection until his next apt with provider. I encouraged pt to call the office if he didn't want to wait and we would find something that works, pt stated he would do that.

## 2024-01-31 NOTE — TELEPHONE ENCOUNTER
Please call and see if there is a time he would like to come in prior to March for the shoulder injection if so please forward message to Maria T for her to work on a time with him.  Otherwise I can do it in March.  Tell him to take a Tylenol arthritis once a day in the morning and to take and ibuprofen 600 mg if he needs it daily to help with his arthritis but only take the ibuprofen if it is a bad day.  I will call in both of these medicines for him.

## 2024-01-31 NOTE — TELEPHONE ENCOUNTER
I can see him this Friday to give him a shoulder injection if he would like.  If he cannot make that work we can look at a different time.  Probably needs talk to him in person about the arthritis medication because it just depends on what else he is doing.

## 2024-01-31 NOTE — TELEPHONE ENCOUNTER
"Spoke with pt this morning, he stated he will not be in town friday he is leaving for [place] and won't be back until next week. Also mentioned to pt provider has recommended an apt to discuss arthritis medication due to finding the appropriate medication for him with his current medications, pt then stated \"well yall got the list right there and I mentioned it last time I was in there, I don't have another apt until March.\"    "

## 2024-02-21 RX ORDER — KETOCONAZOLE 20 MG/ML
SHAMPOO TOPICAL 2 TIMES WEEKLY
Qty: 120 ML | Refills: 0 | Status: SHIPPED | OUTPATIENT
Start: 2024-02-22

## 2024-02-21 RX ORDER — KETOCONAZOLE 20 MG/G
CREAM TOPICAL DAILY
Qty: 60 G | Refills: 0 | Status: SHIPPED | OUTPATIENT
Start: 2024-02-21

## 2024-02-22 ENCOUNTER — TELEPHONE (OUTPATIENT)
Dept: INTERNAL MEDICINE | Facility: CLINIC | Age: 64
End: 2024-02-22
Payer: COMMERCIAL

## 2024-02-22 NOTE — TELEPHONE ENCOUNTER
Caller: Dallas Hurt    Relationship to patient: Self    Best call back number: [phone]    Chief complaint: SHOULDER PAIN, INJECTION TO RELIEVE PAIN     Type of visit: OFFICE     Requested date: TODAY 2.22.24 OR TOMORROW 2.23.24    Additional notes: PATIENT STATES HE IS NEEDING TO BE SEEN TODAY OR TOMORROW TO GET AN INJECTION FOR HIS SHOULDER PAIN. PATIENT STATES HE IS NEEDING SOMETHING IN THE MORNING TOMORROW. HUB EPIC UNABLE TO ACCOMMODATE OR WARM TRANSFER. PATIENT REQUESTING A CALL BACK ASAP.

## 2024-02-23 RX ORDER — BLOOD SUGAR DIAGNOSTIC
STRIP MISCELLANEOUS
Qty: 100 EACH | Refills: 0 | Status: SHIPPED | OUTPATIENT
Start: 2024-02-23

## 2024-02-23 NOTE — TELEPHONE ENCOUNTER
I called pt and offered a 745 apt or 10:00 for today. Pt stated he could not do either of those times. I offered for a day next week and pt stated he will be out of town next week and will call later

## 2024-03-04 ENCOUNTER — OFFICE VISIT (OUTPATIENT)
Dept: INTERNAL MEDICINE | Facility: CLINIC | Age: 64
End: 2024-03-04
Payer: COMMERCIAL

## 2024-03-04 VITALS
OXYGEN SATURATION: 94 % | BODY MASS INDEX: 32.56 KG/M2 | TEMPERATURE: 98.4 F | DIASTOLIC BLOOD PRESSURE: 74 MMHG | RESPIRATION RATE: 18 BRPM | WEIGHT: 240.4 LBS | HEIGHT: 72 IN | SYSTOLIC BLOOD PRESSURE: 142 MMHG | HEART RATE: 82 BPM

## 2024-03-04 DIAGNOSIS — G89.29 CHRONIC RIGHT SHOULDER PAIN: ICD-10-CM

## 2024-03-04 DIAGNOSIS — M79.9 SOFT TISSUE LESION: ICD-10-CM

## 2024-03-04 DIAGNOSIS — M79.672 LEFT FOOT PAIN: ICD-10-CM

## 2024-03-04 DIAGNOSIS — E78.5 HYPERLIPIDEMIA LDL GOAL <100: ICD-10-CM

## 2024-03-04 DIAGNOSIS — E11.40 TYPE 2 DIABETES MELLITUS WITH DIABETIC NEUROPATHY, WITHOUT LONG-TERM CURRENT USE OF INSULIN: Primary | ICD-10-CM

## 2024-03-04 DIAGNOSIS — M25.511 CHRONIC RIGHT SHOULDER PAIN: ICD-10-CM

## 2024-03-04 LAB
ALBUMIN SERPL-MCNC: 4.5 G/DL (ref 3.5–5.2)
ALBUMIN UR-MCNC: 1.4 MG/DL
ALBUMIN/GLOB SERPL: 1.7 G/DL
ALP SERPL-CCNC: 62 U/L (ref 39–117)
ALT SERPL W P-5'-P-CCNC: 30 U/L (ref 1–41)
ANION GAP SERPL CALCULATED.3IONS-SCNC: 14.4 MMOL/L (ref 5–15)
AST SERPL-CCNC: 21 U/L (ref 1–40)
BASOPHILS # BLD AUTO: 0.06 10*3/MM3 (ref 0–0.2)
BASOPHILS NFR BLD AUTO: 0.7 % (ref 0–1.5)
BILIRUB SERPL-MCNC: 0.5 MG/DL (ref 0–1.2)
BUN SERPL-MCNC: 13 MG/DL (ref 8–23)
BUN/CREAT SERPL: 9.2 (ref 7–25)
CALCIUM SPEC-SCNC: 9.9 MG/DL (ref 8.6–10.5)
CHLORIDE SERPL-SCNC: 98 MMOL/L (ref 98–107)
CHOLEST SERPL-MCNC: 173 MG/DL (ref 0–200)
CO2 SERPL-SCNC: 23.6 MMOL/L (ref 22–29)
CREAT SERPL-MCNC: 1.42 MG/DL (ref 0.76–1.27)
DEPRECATED RDW RBC AUTO: 38.4 FL (ref 37–54)
EGFRCR SERPLBLD CKD-EPI 2021: 55.5 ML/MIN/1.73
EOSINOPHIL # BLD AUTO: 0.13 10*3/MM3 (ref 0–0.4)
EOSINOPHIL NFR BLD AUTO: 1.6 % (ref 0.3–6.2)
ERYTHROCYTE [DISTWIDTH] IN BLOOD BY AUTOMATED COUNT: 12.1 % (ref 12.3–15.4)
GLOBULIN UR ELPH-MCNC: 2.6 GM/DL
GLUCOSE SERPL-MCNC: 139 MG/DL (ref 65–99)
HBA1C MFR BLD: 9.9 % (ref 4.8–5.6)
HCT VFR BLD AUTO: 51 % (ref 37.5–51)
HDLC SERPL-MCNC: 44 MG/DL (ref 40–60)
HGB BLD-MCNC: 17.5 G/DL (ref 13–17.7)
IMM GRANULOCYTES # BLD AUTO: 0.04 10*3/MM3 (ref 0–0.05)
IMM GRANULOCYTES NFR BLD AUTO: 0.5 % (ref 0–0.5)
LDLC SERPL CALC-MCNC: 93 MG/DL (ref 0–100)
LDLC/HDLC SERPL: 1.97 {RATIO}
LYMPHOCYTES # BLD AUTO: 2.12 10*3/MM3 (ref 0.7–3.1)
LYMPHOCYTES NFR BLD AUTO: 25.5 % (ref 19.6–45.3)
MCH RBC QN AUTO: 29.7 PG (ref 26.6–33)
MCHC RBC AUTO-ENTMCNC: 34.3 G/DL (ref 31.5–35.7)
MCV RBC AUTO: 86.6 FL (ref 79–97)
MONOCYTES # BLD AUTO: 0.64 10*3/MM3 (ref 0.1–0.9)
MONOCYTES NFR BLD AUTO: 7.7 % (ref 5–12)
NEUTROPHILS NFR BLD AUTO: 5.33 10*3/MM3 (ref 1.7–7)
NEUTROPHILS NFR BLD AUTO: 64 % (ref 42.7–76)
NRBC BLD AUTO-RTO: 0 /100 WBC (ref 0–0.2)
PLATELET # BLD AUTO: 226 10*3/MM3 (ref 140–450)
PMV BLD AUTO: 10.2 FL (ref 6–12)
POTASSIUM SERPL-SCNC: 4.4 MMOL/L (ref 3.5–5.2)
PROT SERPL-MCNC: 7.1 G/DL (ref 6–8.5)
RBC # BLD AUTO: 5.89 10*6/MM3 (ref 4.14–5.8)
SODIUM SERPL-SCNC: 136 MMOL/L (ref 136–145)
TRIGL SERPL-MCNC: 211 MG/DL (ref 0–150)
TSH SERPL DL<=0.05 MIU/L-ACNC: 1.24 UIU/ML (ref 0.27–4.2)
VLDLC SERPL-MCNC: 36 MG/DL (ref 5–40)
WBC NRBC COR # BLD AUTO: 8.32 10*3/MM3 (ref 3.4–10.8)

## 2024-03-04 PROCEDURE — 83036 HEMOGLOBIN GLYCOSYLATED A1C: CPT | Performed by: INTERNAL MEDICINE

## 2024-03-04 PROCEDURE — 80050 GENERAL HEALTH PANEL: CPT | Performed by: INTERNAL MEDICINE

## 2024-03-04 PROCEDURE — 80061 LIPID PANEL: CPT | Performed by: INTERNAL MEDICINE

## 2024-03-04 PROCEDURE — 82043 UR ALBUMIN QUANTITATIVE: CPT | Performed by: INTERNAL MEDICINE

## 2024-03-04 RX ORDER — TRIAMCINOLONE ACETONIDE 40 MG/ML
80 INJECTION, SUSPENSION INTRA-ARTICULAR; INTRAMUSCULAR
Status: COMPLETED | OUTPATIENT
Start: 2024-03-04 | End: 2024-03-04

## 2024-03-04 RX ADMIN — TRIAMCINOLONE ACETONIDE 80 MG: 40 INJECTION, SUSPENSION INTRA-ARTICULAR; INTRAMUSCULAR at 15:30
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
## 2024-03-07 ENCOUNTER — PRIOR AUTHORIZATION (OUTPATIENT)
Dept: INTERNAL MEDICINE | Facility: CLINIC | Age: 64
End: 2024-03-07
Payer: COMMERCIAL

## 2024-03-07 NOTE — TELEPHONE ENCOUNTER
PA was approved for Diclofenac Sodium 1% gel    Approved today  Your PA request has been approved. Additional information will be provided in the approval communication. (Message 1144)  Authorization Expiration Date: 7/5/2024    [employer] message sent to pt.

## 2024-03-18 RX ORDER — KETOCONAZOLE 20 MG/G
CREAM TOPICAL
Qty: 60 G | Refills: 0 | Status: SHIPPED | OUTPATIENT
Start: 2024-03-18

## 2024-03-18 RX ORDER — BUPROPION HYDROCHLORIDE 100 MG/1
TABLET, EXTENDED RELEASE ORAL
Qty: 180 TABLET | Refills: 0 | Status: SHIPPED | OUTPATIENT
Start: 2024-03-18

## 2024-03-18 RX ORDER — BETAMETHASONE DIPROPIONATE 0.5 MG/G
LOTION TOPICAL
Qty: 60 ML | Refills: 0 | Status: SHIPPED | OUTPATIENT
Start: 2024-03-18

## 2024-03-18 RX ORDER — CYCLOBENZAPRINE HCL 10 MG
10 TABLET ORAL 3 TIMES DAILY PRN
Qty: 90 TABLET | Refills: 0 | Status: SHIPPED | OUTPATIENT
Start: 2024-03-18

## 2024-03-21 ENCOUNTER — TELEPHONE (OUTPATIENT)
Dept: INTERNAL MEDICINE | Facility: CLINIC | Age: 64
End: 2024-03-21

## 2024-03-21 NOTE — TELEPHONE ENCOUNTER
Caller: Dallas Hurt    Relationship: Self    Best call back number: [phone]    What medication are you requesting: NEUROPATHY PAIN MEDICATION    What are your current symptoms: SEVERE PAIN IN HAND AND FEET THAT LASTS ABOUT 5-10 MINS AT A TIME.     How long have you been experiencing symptoms: 2 WEEKS    Have you had these symptoms before:    [x] Yes  [] No    Have you been treated for these symptoms before:   [x] Yes  [] No    If a prescription is needed, what is your preferred pharmacy and phone number: [facility] [address] [place] [address] [phone] [facility] [phone]      Additional notes:

## 2024-03-25 ENCOUNTER — TELEPHONE (OUTPATIENT)
Dept: INTERNAL MEDICINE | Facility: CLINIC | Age: 64
End: 2024-03-25
Payer: COMMERCIAL

## 2024-03-25 NOTE — TELEPHONE ENCOUNTER
"Received call from patient stating that he was following up on a call he made on 3/21/2024.  He said he had called in to see if Dr. Moreno would order something else for him and the pain that he described himself as being \"neuropathy, at least that's what I think it is.  It feels like pins and needles when I go to bed at night.\"  Patient stated that he was NOT able to use the Voltaren gel four times a day as written - he confirmed only being able to use once daily.  Told him I would send message over for Dr. Moreno to review.    "

## 2024-03-26 RX ORDER — GABAPENTIN 300 MG/1
300 CAPSULE ORAL 3 TIMES DAILY
Qty: 90 CAPSULE | Refills: 1 | Status: SHIPPED | OUTPATIENT
Start: 2024-03-26

## 2024-03-26 NOTE — TELEPHONE ENCOUNTER
See other message about gabapentin.  Need more info on the shingles to know what he needs and how to help, please ask him more about that.

## 2024-03-26 NOTE — TELEPHONE ENCOUNTER
Typically gabapentin is the medicine with would use for this.  It is technically controlled, and can make people a bit tired.  Would he like to try this or does he have a lot of questions?

## 2024-03-26 NOTE — TELEPHONE ENCOUNTER
PATIENT STATES THAT HE HAS CALLED 4 TIMES ASKING ABOUT THIS AND HAS NOT GOTTEN A CALL BACK TO ADVISE. PATIENT STATES HE NEEDS A CALL BACK AS SOON AS POSSIBLE.     PATIENT HAS ADVISED THAT HE ALSO HAS AND ISSUE NOW WITH SHINGLES. PATIENT NEEDS A CALL BACK AS SOON AS POSSIBLE TO ADVISE.

## 2024-03-27 ENCOUNTER — TELEPHONE (OUTPATIENT)
Dept: INTERNAL MEDICINE | Facility: CLINIC | Age: 64
End: 2024-03-27

## 2024-03-27 RX ORDER — FLUCONAZOLE 100 MG/1
TABLET ORAL
Qty: 10 TABLET | Refills: 0 | Status: SHIPPED | OUTPATIENT
Start: 2024-03-27

## 2024-03-27 NOTE — TELEPHONE ENCOUNTER
Caller: Dallas Hurt    Relationship: Self    Best call back number:     [phone]       What orders are you requesting (i.e. lab or imaging): ORDER FOR DIABETIC SHOES    Additional notes: PATIENT STATES THAT HE HAD BEEN TOLD THAT HE COULD GET THESE THROUGH HIS INSURANCE. PATIENT STATES THAT HE WOULD LIKE TO SPEAK WITH SOMEONE ABOUT HOW THIS WORKS.

## 2024-03-27 NOTE — TELEPHONE ENCOUNTER
Patient states he has blisters on bilateral legs, both sides of abdomen, and bilateral arms, he states it has slow down a little he has been using the A&D and that has help some but he would like to get something that would work better, he states he got this last year and was prescribe a type of cream but he can't remember the name of it but he would like to get this, please advise

## 2024-03-28 RX ORDER — TRIAMCINOLONE ACETONIDE 5 MG/G
1 OINTMENT TOPICAL 2 TIMES DAILY
Qty: 15 G | Refills: 0 | Status: SHIPPED | OUTPATIENT
Start: 2024-03-28

## 2024-03-28 NOTE — TELEPHONE ENCOUNTER
"Called and spoke with pt, informed pt he would need to have a podiatrist order the shoes, I explained to pt I see he had a podiatrist, pt stated he was \"not going back to that foot doctor, he saw him for an ingrown toe nail and he wasn't going back\", pt stated he was going to see someone in [place], asked pt if he needed a referral to see them, pt stated he will call us if he does but right now that gave him all the information on where to start.   "

## 2024-03-28 NOTE — TELEPHONE ENCOUNTER
I sent in some triamcinolone cream for him, but if symptoms continue he should come in to be seen.

## 2024-03-29 RX ORDER — ATORVASTATIN CALCIUM 20 MG/1
TABLET, FILM COATED ORAL
Qty: 90 TABLET | Refills: 0 | Status: SHIPPED | OUTPATIENT
Start: 2024-03-29

## 2024-04-01 RX ORDER — BLOOD SUGAR DIAGNOSTIC
STRIP MISCELLANEOUS
Qty: 100 EACH | Refills: 0 | Status: SHIPPED | OUTPATIENT
Start: 2024-04-01

## 2024-04-15 DIAGNOSIS — K21.9 GASTROESOPHAGEAL REFLUX DISEASE WITHOUT ESOPHAGITIS: ICD-10-CM

## 2024-04-15 RX ORDER — DEXLANSOPRAZOLE 60 MG/1
1 CAPSULE, DELAYED RELEASE ORAL DAILY
Qty: 90 CAPSULE | Refills: 0 | Status: SHIPPED | OUTPATIENT
Start: 2024-04-15

## 2024-04-19 ENCOUNTER — PRIOR AUTHORIZATION (OUTPATIENT)
Dept: INTERNAL MEDICINE | Facility: CLINIC | Age: 64
End: 2024-04-19
Payer: COMMERCIAL

## 2024-04-19 NOTE — TELEPHONE ENCOUNTER
PA approved for dexilant 60mg.  Your PA request has been approved. Additional information will be provided in the approval communication. (Message 1141)  Authorization Expiration Date: 4/18/2027    [employer] message sent to pt.

## 2024-04-22 RX ORDER — MONTELUKAST SODIUM 10 MG/1
TABLET ORAL
Qty: 90 TABLET | Refills: 0 | Status: SHIPPED | OUTPATIENT
Start: 2024-04-22

## 2024-04-27 DIAGNOSIS — E11.40 TYPE 2 DIABETES MELLITUS WITH DIABETIC NEUROPATHY, WITHOUT LONG-TERM CURRENT USE OF INSULIN: ICD-10-CM

## 2024-04-29 RX ORDER — TIRZEPATIDE 12.5 MG/.5ML
INJECTION, SOLUTION SUBCUTANEOUS
Qty: 12 ML | Refills: 0 | Status: SHIPPED | OUTPATIENT
Start: 2024-04-29

## 2024-05-01 RX ORDER — BLOOD SUGAR DIAGNOSTIC
STRIP MISCELLANEOUS
Qty: 100 EACH | Refills: 0 | Status: SHIPPED | OUTPATIENT
Start: 2024-05-01

## 2024-05-03 DIAGNOSIS — E11.40 TYPE 2 DIABETES MELLITUS WITH DIABETIC NEUROPATHY, WITHOUT LONG-TERM CURRENT USE OF INSULIN: ICD-10-CM

## 2024-05-03 RX ORDER — EMPAGLIFLOZIN AND METFORMIN HYDROCHLORIDE 12.5; 5 MG/1; MG/1
1 TABLET ORAL
Qty: 180 TABLET | Refills: 0 | Status: SHIPPED | OUTPATIENT
Start: 2024-05-03

## 2024-05-22 DIAGNOSIS — E11.40 TYPE 2 DIABETES MELLITUS WITH DIABETIC NEUROPATHY, WITHOUT LONG-TERM CURRENT USE OF INSULIN: Primary | ICD-10-CM

## 2024-05-22 NOTE — TELEPHONE ENCOUNTER
Caller: Dallas Hurt    Relationship: Self    Best call back number: [phone]    Requested Prescriptions:   Mounjaro 12.5 MG/0.5ML solution pen-injector pen   PATIENT IS REQUESTING A 90 DAY QTY PLEASE       Pharmacy where request should be sent:    [facility] [address] [place] [address] - [phone] [facility] [phone]  [phone]       Last office visit with prescribing clinician: 3/4/2024   Last telemedicine visit with prescribing clinician: Visit date not found   Next office visit with prescribing clinician: 6/4/2024     Additional details provided by patient: PATIENT IS REQUESTING A 90 DAY QUANTITY PLEASE. PATIENT SAID IT IS A LOT LESS EXPENSE IF HE GETS A 90 DAY QUANTITY    Does the patient have less than a 3 day supply:  [] Yes  [x] No    Would you like a call back once the refill request has been completed: [] Yes [] No    If the office needs to give you a call back, can they leave a voicemail: [] Yes [] No    Melba Wheatley Rep   05/22/24 09:11 EDT

## 2024-05-22 NOTE — TELEPHONE ENCOUNTER
Spoke with patient rely message, pt states he is doing really good on medication he states his fasting blood sugar was 170 and he will like to go to the 15mg dose, please advise

## 2024-05-23 ENCOUNTER — OFFICE VISIT (OUTPATIENT)
Dept: PODIATRY | Facility: CLINIC | Age: 64
End: 2024-05-23
Payer: COMMERCIAL

## 2024-05-23 VITALS
WEIGHT: 230 LBS | SYSTOLIC BLOOD PRESSURE: 109 MMHG | HEART RATE: 86 BPM | DIASTOLIC BLOOD PRESSURE: 72 MMHG | OXYGEN SATURATION: 96 % | TEMPERATURE: 98 F | BODY MASS INDEX: 31.15 KG/M2 | HEIGHT: 72 IN

## 2024-05-23 DIAGNOSIS — E11.8 DM FEET: ICD-10-CM

## 2024-05-23 DIAGNOSIS — E11.40 TYPE 2 DIABETES MELLITUS WITH DIABETIC NEUROPATHY, WITHOUT LONG-TERM CURRENT USE OF INSULIN: ICD-10-CM

## 2024-05-23 DIAGNOSIS — G62.9 NEUROPATHY: ICD-10-CM

## 2024-05-23 DIAGNOSIS — E11.42 TYPE 2 DIABETES MELLITUS WITH POLYNEUROPATHY: Primary | ICD-10-CM

## 2024-05-23 NOTE — TELEPHONE ENCOUNTER
Caller: Dallas Hurt    Relationship: Self    Best call back number: [phone]     Requested Prescriptions:   Requested Prescriptions     Pending Prescriptions Disp Refills    Tirzepatide (Mounjaro) 12.5 MG/0.5ML solution pen-injector pen 12 mL 0      Pharmacy where request should be sent: [email or web address] [address] [place] [address] [phone] [facility] [phone]      Last office visit with prescribing clinician: 3/4/2024   Last telemedicine visit with prescribing clinician: Visit date not found   Next office visit with prescribing clinician: 6/4/2024     Additional details provided by patient: PATIENT STATES THAT HE WAS SUPPOSED TO HAVE GOTTEN THIS FROM [email or web address] ALREADY    Does the patient have less than a 3 day supply:  [x] Yes  [] No    Would you like a call back once the refill request has been completed: [] Yes [] No    If the office needs to give you a call back, can they leave a voicemail: [] Yes [] No    Melba Bui Rep   05/23/24 16:01 EDT

## 2024-05-23 NOTE — PROGRESS NOTES
[facility] - PODIATRY    Today's Date: 05/23/24    Patient Name: Dallas Hurt  MRN: [id number]  CSN: [id number]  PCP: Ana Moreno MD, Last PCP Visit:  3/4/2024  Referring Provider: No ref. provider found    SUBJECTIVE     Chief Complaint   Patient presents with    Left Foot - Follow-up, Annual Exam, Diabetes, Tingling, Numbness     Neuropathy     Right Foot - Follow-up, Annual Exam, Diabetes, Tingling, Numbness     Neuropathy      HPI: Dallas Hurt, a 64 y.o.male, presents to clinic for a diabetic foot evaluation.    New, Established, New Problem:  est    Onset: Insidious    Nature:  NIDDM    Patient controlling diabetes via: Weekly injections    Patient states there last blood glucose was:  175    Patient denies any fevers, chills, nausea, vomiting, shortness of breath, nor any other constitutional signs nor symptoms.    Neuropathy    Past Medical History:   Diagnosis Date    Atrial fibrillation     Atrial flutter     Diabetes mellitus     Hyperlipidemia     Ingrown toenail     Low testosterone      Past Surgical History:   Procedure Laterality Date    ABLATION OF DYSRHYTHMIC FOCUS      COLONOSCOPY       Family History   Problem Relation Age of Onset    Diabetes Other     Diabetes Mother     Atrial fibrillation Father     Diabetes Father     Cancer Father     Atrial fibrillation Brother     Hypertension Brother     Aneurysm Paternal Grandmother     No Known Problems Maternal Grandmother     No Known Problems Maternal Grandfather     No Known Problems Paternal Grandfather      Social History     Socioeconomic History    Marital status:    Tobacco Use    Smoking status: Never     Passive exposure: Yes    Smokeless tobacco: Never    Tobacco comments:     CAFFEINE USE: 2 CUPS COFFEE DAILY/ 2 LITER COKE ZERO DAILY   Vaping Use    Vaping status: Never Used   Substance and Sexual Activity    Alcohol use: Yes     Alcohol/week: 3.0 standard drinks of alcohol     Types: 3 Shots of liquor per  week    Drug use: No    Sexual activity: Defer     Allergies   Allergen Reactions    Morphine And Related      Current Outpatient Medications   Medication Sig Dispense Refill    Accu-Chek Guide test strip USE TO TEST BLOOD SUGAR THREE TIMES DAILY AS NEEDED. USE AS DIRECTED 100 each 0    Accu-Chek Softclix Lancets lancets USE AS DIRECTED THREE TIMES DAILY 100 each 0    acetaminophen (TYLENOL) 500 MG tablet Take 1 tablet by mouth Every 6 (Six) Hours As Needed for Mild Pain. 90 tablet 1    atorvastatin (LIPITOR) 20 MG tablet Take 1 tablet by mouth once daily 90 tablet 0    betamethasone dipropionate 0.05 % lotion APPLY TOPICALLY TO THE APPROPRIATE AREA AS DIRECTED TWO TIMES PER DAY 60 mL 0    buPROPion SR (WELLBUTRIN SR) 100 MG 12 hr tablet Take 1 tablet by mouth twice daily 180 tablet 0    CBD oil (cannabidiol) capsule Take 1 capsule by mouth 2 (Two) Times a Day.      cetirizine (zyrTEC) 10 MG tablet Take 1 tablet by mouth Daily.      clindamycin (CLINDAGEL) 1 % gel Apply 1 application topically to the appropriate area as directed 2 (Two) Times a Day. 60 g 0    cyclobenzaprine (FLEXERIL) 10 MG tablet Take 1 tablet by mouth three times daily as needed for muscle spasm 90 tablet 0    dexlansoprazole (DEXILANT) 60 MG capsule Take 1 capsule by mouth once daily 90 capsule 0    Diclofenac Sodium (VOLTAREN) 1 % gel gel APPLY 4 GRAMS TOPICALLY AS DIRECTED 4 TIMES A DAY AS NEEDED FOR HAND PAIN 100 g 0    docusate sodium (COLACE) 100 MG capsule Take 1 capsule by mouth Daily.      flecainide (TAMBOCOR) 50 MG tablet Take 1 tablet by mouth twice daily 180 tablet 3    fluconazole (DIFLUCAN) 100 MG tablet Take 1 tablet by mouth once daily 10 tablet 0    gabapentin (NEURONTIN) 300 MG capsule Take 1 capsule by mouth 3 (Three) Times a Day. 90 capsule 1    glucose monitor monitoring kit 1 each 2 (Two) Times a Day As Needed (to test blood sugar). DX: E11.9 1 each 0    ketoconazole (NIZORAL) 2 % cream APPLY TOPICALLY TO THE APPROPRIATE  AREA(S) AS DIRECTED DAILY. 60 g 0    ketoconazole (NIZORAL) 2 % shampoo Apply  topically to the appropriate area as directed 2 (Two) Times a Week. 120 mL 0    montelukast (SINGULAIR) 10 MG tablet TAKE 1 TABLET BY MOUTH ONCE DAILY IN THE EVENING 90 tablet 0    Mounjaro 12.5 MG/0.5ML solution pen-injector pen INJECT 0.5ML UNDER THE SKIN INTO THE APPROPRIATE AREA AS DIRECTED ONE TIME PER WEEK. 12 mL 0    Synjardy 12.5-500 MG tablet TAKE 1 TABLET BY MOUTH TWICE DAILY BEFORE MEAL(S) 180 tablet 0    tadalafil (CIALIS) 20 MG tablet Take 1 tablet by mouth Daily As Needed for Erectile Dysfunction. 90 tablet 1    triamcinolone (KENALOG) 0.5 % ointment Apply 1 Application topically to the appropriate area as directed 2 (Two) Times a Day. 15 g 0     No current facility-administered medications for this visit.     Review of Systems   Constitutional: Negative.    Neurological:  Positive for numbness.   All other systems reviewed and are negative.      OBJECTIVE     Vitals:    05/23/24 1521   BP: 109/72   Pulse: 86   Temp: 98 °F (36.7 °C)   SpO2: 96%       Body mass index is 31.19 kg/m².    Lab Results   Component Value Date    HGBA1C 9.90 (H) 03/04/2024       Lab Results   Component Value Date    GLUCOSE 139 (H) 03/04/2024    CALCIUM 9.9 03/04/2024     03/04/2024    K 4.4 03/04/2024    CO2 23.6 03/04/2024    CL 98 03/04/2024    BUN 13 03/04/2024    CREATININE 1.42 (H) 03/04/2024    EGFRIFNONA 78 12/14/2021    BCR 9.2 03/04/2024    ANIONGAP 14.4 03/04/2024       Patient seen in no apparent distress.      PHYSICAL EXAM:     Foot/Ankle Exam    GENERAL  Diabetic foot exam performed    Appearance:  appears stated age  Orientation:  AAOx3  Affect:  appropriate  Gait:  unimpaired  Assistance:  independent  Right shoe gear: casual shoe  Left shoe gear: casual shoe    VASCULAR     Right Foot Vascularity   Dorsalis pedis:  2+  Posterior tibial:  2+  Skin temperature:  warm  Edema grading:  None  CFT:  < 3 seconds  Pedal hair growth:   Present  Varicosities:  mild varicosities     Left Foot Vascularity   Dorsalis pedis:  2+  Posterior tibial:  2+  Skin temperature:  warm  Edema grading:  None  CFT:  < 3 seconds  Pedal hair growth:  Present  Varicosities:  mild varicosities     NEUROLOGIC     Right Foot Neurologic   Light touch sensation: diminished  Vibratory sensation: diminished  Hot/Cold sensation: diminished  Protective Sensation using Winslow-Marylu Monofilament:   Sites intact: 4  Sites tested: 10     Left Foot Neurologic   Light touch sensation: diminished  Vibratory sensation: diminished  Hot/Cold sensation:  diminished  Protective Sensation using Winslow-Marylu Monofilament:   Sites intact: 4  Sites tested: 10    MUSCULOSKELETAL     Right Foot Musculoskeletal   Arch:  Pes cavus     Left Foot Musculoskeletal   Arch:  Pes cavus    MUSCLE STRENGTH     Right Foot Muscle Strength   Foot dorsiflexion:  4  Foot plantar flexion:  4  Foot inversion:  4  Foot eversion:  4     Left Foot Muscle Strength   Foot dorsiflexion:  4  Foot plantar flexion:  4  Foot inversion:  4  Foot eversion:  4    RANGE OF MOTION     Right Foot Range of Motion   Foot and ankle ROM within normal limits       Left Foot Range of Motion   Foot and ankle ROM within normal limits      DERMATOLOGIC      Right Foot Dermatologic   Skin  Right foot skin is intact.      Left Foot Dermatologic   Skin  Left foot skin is intact.       Diabetic Foot Exam Performed and Monofilament Test Performed      ASSESSMENT/PLAN     Diagnoses and all orders for this visit:    1. Type 2 diabetes mellitus with polyneuropathy (Primary)    2. DM feet    3. Neuropathy    Rx:  DM Shoes    Comprehensive lower extremity examination and evaluation was performed.    Discussed findings and treatment plan including risks, benefits, and treatment options with patient in detail. Patient agreed with treatment plan.    Medications and allergies reviewed.  Reviewed available blood glucose and HgB A1C lab values  along with other pertinent labs.  These were discussed with the patient as to their importance of diabetic maintenance.    Diabetic foot exam performed and documented this date, compliant with CQM required standards. Detail of findings as noted in physical exam.  Lower extremity Neurologic exam for diabetic patient performed and documented this date, compliant with PQRS required standards. Detail of findings as noted in physical exam.  Advised patient importance of good routine lower extremity hygiene. Advised patient importance of evaluating for intact skin and pain free nail borders.  Advised patient to use mirror to evaluate plantar/ soles of feet for better visualization. Advised patient monitor and phone office to be seen if any cracking to skin, open lesions, painful nail borders or if nails become elongated prior to next visit. Advised patient importance of daily cleansing of lower extremities, followed by good skin cream to maintain normal hydration of skin. Also advised patient importance of close daily monitoring of blood sugar. Advised to regulate diet and medications to maintain control of blood sugar in optimal range. Contact primary care provider if difficulties maintaining blood sugar levels.  Advised Patient of presence of Diabetes Mellitus condition.  Advised Patient risk of progression and worsening or improvement, then return of condition.  Will monitor condition for any change in future. Treat with most appropriate treatment pending status of condition.  Counseled and advised patient extensively on nature and ramifications of diabetes. Standard instructions given to patient for good diabetic foot care and maintenance. Advised importance of careful monitoring to avoid break down and complications secondary to diabetes. Advised patient importance of strict maintenance of blood sugar control. Advised patient of possible ominous results from neglect of condition, i.e.: amputation/ loss of digits,  feet and legs, or even death.  Patient states understands counseling, will monitor closely, continue good hygiene and routine diabetic foot care. Patient will contact office is questions or problems.      An After Visit Summary was printed and given to the patient at discharge, including (if requested) any available informative/educational handouts regarding diagnosis, treatment, or medications. All questions were answered to patient/family satisfaction. Should symptoms fail to improve or worsen they agree to call or return to clinic or to go to the Emergency Department. Discussed the importance of following up with any needed screening tests/labs/specialist appointments and any requested follow-up recommended by me today. Importance of maintaining follow-up discussed and patient accepts that missed appointments can delay diagnosis and potentially lead to worsening of conditions.    Return in about 1 year (around 5/23/2025) for DFE., or sooner if acute issues arise.    This document has been electronically signed by Alonso Garcia DPM on May 23, 2024 15:34 EDT

## 2024-05-24 RX ORDER — KETOCONAZOLE 20 MG/ML
SHAMPOO TOPICAL
Qty: 120 ML | Refills: 0 | Status: SHIPPED | OUTPATIENT
Start: 2024-05-24

## 2024-05-24 RX ORDER — TADALAFIL 20 MG/1
20 TABLET ORAL DAILY PRN
Qty: 90 TABLET | Refills: 0 | Status: SHIPPED | OUTPATIENT
Start: 2024-05-24

## 2024-05-24 NOTE — TELEPHONE ENCOUNTER
Please call and let him know that I have sent over the higher dose medication have him continue to monitor for nausea.

## 2024-05-24 NOTE — TELEPHONE ENCOUNTER
Patient states he can't make it for his appt on 6/4 and he would like to move his appt, I informed patient that next available is not until end of August, please advise if we can move appt to be seen sooner

## 2024-05-30 ENCOUNTER — TELEPHONE (OUTPATIENT)
Dept: INTERNAL MEDICINE | Facility: CLINIC | Age: 64
End: 2024-05-30
Payer: COMMERCIAL

## 2024-05-30 NOTE — TELEPHONE ENCOUNTER
[facility] called and wanted to ask about the mounjaro. They need to know how many ML's he is supposed to take. They dosage is usually 2 ml and 4 pens to a box.   Also they stated that they can not get the diclofenac gel they can not get as a prescription but stated that if we resend the prescription and put over the counter then they can supply it.

## 2024-05-31 ENCOUNTER — TELEPHONE (OUTPATIENT)
Dept: INTERNAL MEDICINE | Facility: CLINIC | Age: 64
End: 2024-05-31
Payer: COMMERCIAL

## 2024-05-31 NOTE — TELEPHONE ENCOUNTER
You can see if Dr. Coffey or Sharla would see him as I believe they both do shoulders.  However otherwise have Maria T call him to get him on my schedule next week.

## 2024-05-31 NOTE — TELEPHONE ENCOUNTER
Caller: Dallas Hurt    Relationship to patient: Self    Best call back number: [phone]    Chief complaint: INJECTION RIGHT SHOULDER     Type of visit: IN-OFFICE PROCEDURE     Requested date:  5-31-24    If rescheduling, when is the original appointment: 6-4-24    Additional notes: PATIENT REQUESTING TO BE SEEN TODAY

## 2024-05-31 NOTE — TELEPHONE ENCOUNTER
Called and spoke with pt, pt requested to go ahead and move apt to Thursday since pt will be in [place] Monday night and his apt Tuesday he may not make it, so pt requested to be rescheduled, rescheduled for Thursday with Dr. Coffey, while on the phone pt stated he did go to the foot doctor and the Diclofenac gel Dr. Moreno has been prescribing is working great and the foot doctor told pt to keep using it, pt had requested a 90 day supply due to him driving often and he doesn't want to have to call the pharmacy each month and call us for a refill.

## 2024-05-31 NOTE — TELEPHONE ENCOUNTER
4 pens is fine, I think I was trying to give him a 3 month supply though.  Also resending the diclofenac to say over the counter is fine.

## 2024-06-06 ENCOUNTER — OFFICE VISIT (OUTPATIENT)
Dept: INTERNAL MEDICINE | Facility: CLINIC | Age: 64
End: 2024-06-06
Payer: COMMERCIAL

## 2024-06-06 VITALS
SYSTOLIC BLOOD PRESSURE: 110 MMHG | TEMPERATURE: 98.1 F | OXYGEN SATURATION: 97 % | DIASTOLIC BLOOD PRESSURE: 68 MMHG | BODY MASS INDEX: 31.26 KG/M2 | HEIGHT: 72 IN | HEART RATE: 78 BPM | WEIGHT: 230.8 LBS

## 2024-06-06 DIAGNOSIS — G89.29 CHRONIC RIGHT SHOULDER PAIN: Primary | ICD-10-CM

## 2024-06-06 DIAGNOSIS — M25.511 CHRONIC RIGHT SHOULDER PAIN: Primary | ICD-10-CM

## 2024-06-06 PROCEDURE — 20610 DRAIN/INJ JOINT/BURSA W/O US: CPT | Performed by: INTERNAL MEDICINE

## 2024-06-06 RX ORDER — TRIAMCINOLONE ACETONIDE 40 MG/ML
80 INJECTION, SUSPENSION INTRA-ARTICULAR; INTRAMUSCULAR
Status: COMPLETED | OUTPATIENT
Start: 2024-06-06 | End: 2024-06-06

## 2024-06-06 RX ORDER — LIDOCAINE HYDROCHLORIDE 10 MG/ML
5 INJECTION, SOLUTION INFILTRATION; PERINEURAL
Status: COMPLETED | OUTPATIENT
Start: 2024-06-06 | End: 2024-06-06

## 2024-06-06 RX ADMIN — LIDOCAINE HYDROCHLORIDE 5 ML: 10 INJECTION, SOLUTION INFILTRATION; PERINEURAL at 09:03

## 2024-06-06 RX ADMIN — TRIAMCINOLONE ACETONIDE 80 MG: 40 INJECTION, SUSPENSION INTRA-ARTICULAR; INTRAMUSCULAR at 09:03

## 2024-06-06 NOTE — PROGRESS NOTES
"Chief Complaint  Shoulder Pain (Rt shoulder pain/shoulder injection) and Diabetes (Form for Diabetic shoes)    Subjective      Dallas Hurt is a 64 y.o. male who presents to [facility]     Presenting for right shoulder injection. Last injection 3/4/24.     Objective   Vital Signs:   Vitals:    06/06/24 0844   BP: 110/68   Pulse: 78   Temp: 98.1 °F (36.7 °C)   TempSrc: Temporal   SpO2: 97%   Weight: 105 kg (230 lb 12.8 oz)   Height: 182.9 cm (72\")     Body mass index is 31.3 kg/m².    Wt Readings from Last 3 Encounters:   06/06/24 105 kg (230 lb 12.8 oz)   05/23/24 104 kg (230 lb)   03/04/24 109 kg (240 lb 6.4 oz)     BP Readings from Last 3 Encounters:   06/06/24 110/68   05/23/24 109/72   03/04/24 142/74       Health Maintenance   Topic Date Due    DIABETIC EYE EXAM  Never done    ANNUAL PHYSICAL  Never done    RSV Vaccine - Adults (1 - 1-dose 60+ series) Never done    TDAP/TD VACCINES (2 - Td or Tdap) 05/04/2021    BMI FOLLOWUP  05/05/2024    HEMOGLOBIN A1C  09/04/2024    INFLUENZA VACCINE  08/01/2024    LIPID PANEL  03/04/2025    URINE MICROALBUMIN  03/04/2025    DIABETIC FOOT EXAM  05/23/2025    COLORECTAL CANCER SCREENING  12/28/2026    HEPATITIS C SCREENING  Completed    COVID-19 Vaccine  Completed    Pneumococcal Vaccine 0-64  Completed    ZOSTER VACCINE  Completed    LUNG CANCER SCREENING  Discontinued       Physical Exam  Constitutional:       Appearance: Normal appearance.   HENT:      Head: Normocephalic and atraumatic.   Eyes:      General: No scleral icterus.        Right eye: No discharge.         Left eye: No discharge.      Conjunctiva/sclera: Conjunctivae normal.   Pulmonary:      Effort: Pulmonary effort is normal.   Skin:     Findings: No lesion or rash.   Neurological:      Mental Status: He is alert and oriented to person, place, and time. Mental status is at baseline.   Psychiatric:         Mood and Affect: Mood normal.         Behavior: " Behavior normal.         Thought Content: Thought content normal.         Judgment: Judgment normal.          Result Review :  The following data was reviewed by: Aria Coffey MD on 06/06/2024:         Arthrocentesis    Date/Time: 6/6/2024 9:03 AM    Performed by: Aria Coffey MD  Authorized by: Aria Coffey MD  Indications: pain   Body area: shoulder  Joint: right subacromial bursa  Local anesthesia used: yes    Anesthesia:  Local anesthesia used: yes  Local anesthetic: Ethyl chloride spray.    Sedation:  Patient sedated: no    Preparation: Patient was prepped and draped in the usual sterile fashion.  Needle size: 22 G  Ultrasound guidance: no  Approach: posterior  Aspirate amount: 0 mL  Meds administered: 5 mL lidocaine 1 %; 80 mg triamcinolone acetonide 40 MG/ML  Patient tolerance: patient tolerated the procedure well with no immediate complications                Assessment & Plan  Chronic right shoulder pain  Shoulder injection performed. Patient tolerated well.                   FOLLOW UP  Return in about 3 months (around 9/6/2024) for for next shoulder injection or as scheduled with PCP.  Patient was given instructions and counseling regarding his condition or for health maintenance advice. Please see specific information pulled into the AVS if appropriate.       Aria Coffey MD  06/06/24  09:02 EDT    CURRENT & DISCONTINUED MEDICATIONS  Current Outpatient Medications   Medication Instructions    Accu-Chek Guide test strip USE TO TEST BLOOD SUGAR THREE TIMES DAILY AS NEEDED. USE AS DIRECTED    Accu-Chek Softclix Lancets lancets USE AS DIRECTED THREE TIMES DAILY    acetaminophen (TYLENOL) 500 mg, Oral, Every 6 Hours PRN    atorvastatin (LIPITOR) 20 MG tablet Take 1 tablet by mouth once daily    betamethasone dipropionate 0.05 % lotion APPLY TOPICALLY TO THE APPROPRIATE AREA AS DIRECTED TWO TIMES PER DAY    buPROPion SR (WELLBUTRIN SR) 100 MG 12 hr tablet  Take 1 tablet by mouth twice daily    CBD oil (cannabidiol) capsule 1 capsule, Oral, 2 Times Daily    cetirizine (ZYRTEC) 10 mg, Oral, Daily    clindamycin (CLINDAGEL) 1 % gel 1 application , Topical, 2 Times Daily    cyclobenzaprine (FLEXERIL) 10 mg, Oral, 3 Times Daily PRN, for muscle spams    dexlansoprazole (DEXILANT) 60 mg, Oral, Daily    Diclofenac Sodium (VOLTAREN) 1 % gel gel Topical, 4 Times Daily PRN    docusate sodium (COLACE) 100 mg, Oral, Daily    flecainide (TAMBOCOR) 50 MG tablet Take 1 tablet by mouth twice daily    fluconazole (DIFLUCAN) 100 MG tablet Take 1 tablet by mouth once daily    gabapentin (NEURONTIN) 300 mg, Oral, 3 Times Daily    glucose monitor monitoring kit 1 each, Does not apply, 2 Times Daily PRN, DX: E11.9    ketoconazole (NIZORAL) 2 % cream APPLY TOPICALLY TO THE APPROPRIATE AREA(S) AS DIRECTED DAILY.    ketoconazole (NIZORAL) 2 % shampoo APPLY TOPICALLY TO THE APPROPRIATE AREA AS DIRECTED TWO TIMES PER WEEK.    montelukast (SINGULAIR) 10 MG tablet TAKE 1 TABLET BY MOUTH ONCE DAILY IN THE EVENING    Synjardy 12.5-500 MG tablet 1 tablet, Oral, 2 Times Daily Before Meals    tadalafil (CIALIS) 20 mg, Oral, Daily PRN    Tirzepatide (MOUNJARO) 15 mg, Subcutaneous, Weekly    Tirzepatide (MOUNJARO) 12.5 mg, Subcutaneous, Weekly    triamcinolone (KENALOG) 0.5 % ointment 1 Application, Topical, 2 Times Daily       There are no discontinued medications.

## 2024-06-20 RX ORDER — BUPROPION HYDROCHLORIDE 100 MG/1
TABLET, EXTENDED RELEASE ORAL
Qty: 180 TABLET | Refills: 1 | Status: SHIPPED | OUTPATIENT
Start: 2024-06-20

## 2024-07-15 DIAGNOSIS — K21.9 GASTROESOPHAGEAL REFLUX DISEASE WITHOUT ESOPHAGITIS: ICD-10-CM

## 2024-07-15 RX ORDER — FLECAINIDE ACETATE 50 MG/1
TABLET ORAL
Qty: 60 TABLET | Refills: 0 | Status: SHIPPED | OUTPATIENT
Start: 2024-07-15

## 2024-07-15 RX ORDER — DEXLANSOPRAZOLE 60 MG/1
1 CAPSULE, DELAYED RELEASE ORAL DAILY
Qty: 90 CAPSULE | Refills: 0 | Status: SHIPPED | OUTPATIENT
Start: 2024-07-15

## 2024-07-22 RX ORDER — BETAMETHASONE DIPROPIONATE 0.5 MG/G
LOTION TOPICAL
Qty: 60 ML | Refills: 0 | Status: SHIPPED | OUTPATIENT
Start: 2024-07-22

## 2024-07-22 RX ORDER — KETOCONAZOLE 20 MG/ML
SHAMPOO TOPICAL
Qty: 120 ML | Refills: 0 | Status: SHIPPED | OUTPATIENT
Start: 2024-07-22

## 2024-07-22 RX ORDER — MONTELUKAST SODIUM 10 MG/1
TABLET ORAL
Qty: 90 TABLET | Refills: 0 | Status: SHIPPED | OUTPATIENT
Start: 2024-07-22

## 2024-07-30 RX ORDER — TRIAMCINOLONE ACETONIDE 5 MG/G
OINTMENT TOPICAL
Qty: 15 G | Refills: 0 | Status: SHIPPED | OUTPATIENT
Start: 2024-07-30

## 2024-08-06 RX ORDER — KETOCONAZOLE 20 MG/G
CREAM TOPICAL DAILY
Qty: 60 G | Refills: 0 | Status: SHIPPED | OUTPATIENT
Start: 2024-08-06

## 2024-08-06 NOTE — TELEPHONE ENCOUNTER
Rx Refill Note  Requested Prescriptions     Pending Prescriptions Disp Refills    ketoconazole (NIZORAL) 2 % cream 60 g 0     Sig: Apply  topically to the appropriate area as directed Daily.      Last office visit with prescribing clinician: 3/4/2024   Last telemedicine visit with prescribing clinician: Visit date not found   Next office visit with prescribing clinician: 8/28/2024                         Would you like a call back once the refill request has been completed: [] Yes [] No    If the office needs to give you a call back, can they leave a voicemail: [] Yes [] No    Nigel Davenport MA  08/06/24, 09:27 EDT

## 2024-08-09 RX ORDER — FLECAINIDE ACETATE 50 MG/1
50 TABLET ORAL 2 TIMES DAILY
Qty: 60 TABLET | Refills: 0 | Status: SHIPPED | OUTPATIENT
Start: 2024-08-09

## 2024-08-21 DIAGNOSIS — E11.40 TYPE 2 DIABETES MELLITUS WITH DIABETIC NEUROPATHY, WITHOUT LONG-TERM CURRENT USE OF INSULIN: ICD-10-CM

## 2024-08-21 RX ORDER — EMPAGLIFLOZIN AND METFORMIN HYDROCHLORIDE 12.5; 5 MG/1; MG/1
1 TABLET ORAL
Qty: 180 TABLET | Refills: 0 | Status: SHIPPED | OUTPATIENT
Start: 2024-08-21

## 2024-08-28 ENCOUNTER — OFFICE VISIT (OUTPATIENT)
Dept: INTERNAL MEDICINE | Facility: CLINIC | Age: 64
End: 2024-08-28
Payer: COMMERCIAL

## 2024-08-28 VITALS
OXYGEN SATURATION: 95 % | SYSTOLIC BLOOD PRESSURE: 120 MMHG | HEIGHT: 72 IN | HEART RATE: 82 BPM | WEIGHT: 230.4 LBS | BODY MASS INDEX: 31.21 KG/M2 | DIASTOLIC BLOOD PRESSURE: 72 MMHG | RESPIRATION RATE: 20 BRPM | TEMPERATURE: 97.9 F

## 2024-08-28 DIAGNOSIS — R91.1 LUNG NODULE: ICD-10-CM

## 2024-08-28 DIAGNOSIS — G89.29 CHRONIC RIGHT SHOULDER PAIN: ICD-10-CM

## 2024-08-28 DIAGNOSIS — G62.9 NEUROPATHY: ICD-10-CM

## 2024-08-28 DIAGNOSIS — E11.40 TYPE 2 DIABETES MELLITUS WITH DIABETIC NEUROPATHY, WITHOUT LONG-TERM CURRENT USE OF INSULIN: Primary | ICD-10-CM

## 2024-08-28 DIAGNOSIS — M25.511 CHRONIC RIGHT SHOULDER PAIN: ICD-10-CM

## 2024-08-28 DIAGNOSIS — E78.5 HYPERLIPIDEMIA LDL GOAL <100: ICD-10-CM

## 2024-08-28 DIAGNOSIS — T50.905A REACTION TO SHOT, INITIAL ENCOUNTER: ICD-10-CM

## 2024-08-28 DIAGNOSIS — I48.0 PAROXYSMAL ATRIAL FIBRILLATION: ICD-10-CM

## 2024-08-28 LAB
ALBUMIN SERPL-MCNC: 4.7 G/DL (ref 3.5–5.2)
ALBUMIN UR-MCNC: 1.4 MG/DL
ALBUMIN/GLOB SERPL: 1.8 G/DL
ALP SERPL-CCNC: 74 U/L (ref 39–117)
ALT SERPL W P-5'-P-CCNC: 27 U/L (ref 1–41)
ANION GAP SERPL CALCULATED.3IONS-SCNC: 10.7 MMOL/L (ref 5–15)
AST SERPL-CCNC: 18 U/L (ref 1–40)
BASOPHILS # BLD AUTO: 0.08 10*3/MM3 (ref 0–0.2)
BASOPHILS NFR BLD AUTO: 1 % (ref 0–1.5)
BILIRUB SERPL-MCNC: 0.5 MG/DL (ref 0–1.2)
BUN SERPL-MCNC: 14 MG/DL (ref 8–23)
BUN/CREAT SERPL: 13.2 (ref 7–25)
CALCIUM SPEC-SCNC: 10.1 MG/DL (ref 8.6–10.5)
CHLORIDE SERPL-SCNC: 99 MMOL/L (ref 98–107)
CHOLEST SERPL-MCNC: 184 MG/DL (ref 0–200)
CO2 SERPL-SCNC: 24.3 MMOL/L (ref 22–29)
CREAT SERPL-MCNC: 1.06 MG/DL (ref 0.76–1.27)
DEPRECATED RDW RBC AUTO: 37 FL (ref 37–54)
EGFRCR SERPLBLD CKD-EPI 2021: 78.4 ML/MIN/1.73
EOSINOPHIL # BLD AUTO: 0.14 10*3/MM3 (ref 0–0.4)
EOSINOPHIL NFR BLD AUTO: 1.7 % (ref 0.3–6.2)
ERYTHROCYTE [DISTWIDTH] IN BLOOD BY AUTOMATED COUNT: 12.1 % (ref 12.3–15.4)
GLOBULIN UR ELPH-MCNC: 2.6 GM/DL
GLUCOSE SERPL-MCNC: 155 MG/DL (ref 65–99)
HBA1C MFR BLD: 7.5 % (ref 4.8–5.6)
HCT VFR BLD AUTO: 51.8 % (ref 37.5–51)
HDLC SERPL-MCNC: 43 MG/DL (ref 40–60)
HGB BLD-MCNC: 17.6 G/DL (ref 13–17.7)
IMM GRANULOCYTES # BLD AUTO: 0.03 10*3/MM3 (ref 0–0.05)
IMM GRANULOCYTES NFR BLD AUTO: 0.4 % (ref 0–0.5)
LDLC SERPL CALC-MCNC: 106 MG/DL (ref 0–100)
LDLC/HDLC SERPL: 2.33 {RATIO}
LYMPHOCYTES # BLD AUTO: 1.78 10*3/MM3 (ref 0.7–3.1)
LYMPHOCYTES NFR BLD AUTO: 21.6 % (ref 19.6–45.3)
MCH RBC QN AUTO: 29.2 PG (ref 26.6–33)
MCHC RBC AUTO-ENTMCNC: 34 G/DL (ref 31.5–35.7)
MCV RBC AUTO: 85.9 FL (ref 79–97)
MONOCYTES # BLD AUTO: 0.77 10*3/MM3 (ref 0.1–0.9)
MONOCYTES NFR BLD AUTO: 9.3 % (ref 5–12)
NEUTROPHILS NFR BLD AUTO: 5.45 10*3/MM3 (ref 1.7–7)
NEUTROPHILS NFR BLD AUTO: 66 % (ref 42.7–76)
NRBC BLD AUTO-RTO: 0 /100 WBC (ref 0–0.2)
PLATELET # BLD AUTO: 234 10*3/MM3 (ref 140–450)
PMV BLD AUTO: 10.2 FL (ref 6–12)
POTASSIUM SERPL-SCNC: 4.5 MMOL/L (ref 3.5–5.2)
PROT SERPL-MCNC: 7.3 G/DL (ref 6–8.5)
RBC # BLD AUTO: 6.03 10*6/MM3 (ref 4.14–5.8)
SODIUM SERPL-SCNC: 134 MMOL/L (ref 136–145)
TRIGL SERPL-MCNC: 204 MG/DL (ref 0–150)
TSH SERPL DL<=0.05 MIU/L-ACNC: 1.27 UIU/ML (ref 0.27–4.2)
VLDLC SERPL-MCNC: 35 MG/DL (ref 5–40)
WBC NRBC COR # BLD AUTO: 8.25 10*3/MM3 (ref 3.4–10.8)

## 2024-08-28 PROCEDURE — 82043 UR ALBUMIN QUANTITATIVE: CPT | Performed by: INTERNAL MEDICINE

## 2024-08-28 PROCEDURE — 99214 OFFICE O/P EST MOD 30 MIN: CPT | Performed by: INTERNAL MEDICINE

## 2024-08-28 PROCEDURE — 80050 GENERAL HEALTH PANEL: CPT | Performed by: INTERNAL MEDICINE

## 2024-08-28 PROCEDURE — 83036 HEMOGLOBIN GLYCOSYLATED A1C: CPT | Performed by: INTERNAL MEDICINE

## 2024-08-28 PROCEDURE — 80061 LIPID PANEL: CPT | Performed by: INTERNAL MEDICINE

## 2024-08-28 RX ORDER — FLUCONAZOLE 100 MG/1
TABLET ORAL
Qty: 10 TABLET | Refills: 0 | OUTPATIENT
Start: 2024-08-28

## 2024-08-28 RX ORDER — FLECAINIDE ACETATE 50 MG/1
50 TABLET ORAL 2 TIMES DAILY
Qty: 180 TABLET | Refills: 0 | Status: SHIPPED | OUTPATIENT
Start: 2024-08-28

## 2024-08-28 RX ORDER — FLUCONAZOLE 100 MG/1
100 TABLET ORAL DAILY
Qty: 10 TABLET | Refills: 0 | Status: SHIPPED | OUTPATIENT
Start: 2024-08-28

## 2024-08-28 RX ORDER — DIAPER,BRIEF,INFANT-TODD,DISP
1 EACH MISCELLANEOUS 2 TIMES DAILY
Qty: 15 G | Refills: 1 | Status: SHIPPED | OUTPATIENT
Start: 2024-08-28

## 2024-08-28 NOTE — PROGRESS NOTES
Chief Complaint  Diabetes (Follow up) and Medication Reaction (Having itching and redness at injection site Mounjaro.)      Subjective      History of Present Illness  The patient is here for a follow-up.    He reports no shoulder pain in the past few weeks. He has been receiving injections, which were effective for the first two months.     Four weeks ago, he experienced bruising and itching at the mounjaro injection site. The itching typically starts on Wednesday, intensifies on Thursday, and then subsides. He also noticed a large bruise where the needle hit a vein. Initially, he suspected shingles due to the redness and itching, but there was no rash present yesterday. He applied medication for shingles, which seemed to alleviate the symptoms. He is concerned about a possible reaction to the medication. He has been using Benadryl for relief.    His blood sugar levels have been between 160 and 170, with occasional spikes to 190 in the morning.  He is feeling great about his readings.    He mentions that his feet are improving with the use of gabapentin and neuropathy cream. He soaks his feet twice daily and applies the cream to his knees and hands. He changes his shoes once or twice a day for comfort and has found [employer] shoes helpful. He continues to take gabapentin twice daily, which has improved his arthritis symptoms.    He is also on Wellbutrin, which he finds beneficial.    He experiences yeast infections every few months, for which he uses a cream and takes Diflucan. He requests a refill of Diflucan.    He has not had a recent appointment with his cardiologist and has canceled his annual check-up as he feels well. He plans to schedule an appointment with his cardiologist in December 2024.         Objective   Vital Signs:   Vitals:    08/28/24 0825   BP: 120/72   BP Location: Right arm   Patient Position: Sitting   Cuff Size: Adult   Pulse: 82   Resp: 20   Temp: 97.9 °F (36.6 °C)   TempSrc: Temporal   SpO2:  "95%   Weight: 105 kg (230 lb 6.4 oz)   Height: 182.9 cm (72.01\")     Body mass index is 31.24 kg/m².    Wt Readings from Last 3 Encounters:   08/28/24 105 kg (230 lb 6.4 oz)   06/06/24 105 kg (230 lb 12.8 oz)   05/23/24 104 kg (230 lb)     BP Readings from Last 3 Encounters:   08/28/24 120/72   06/06/24 110/68   05/23/24 109/72       Health Maintenance   Topic Date Due    DIABETIC EYE EXAM  Never done    ANNUAL PHYSICAL  Never done    TDAP/TD VACCINES (2 - Td or Tdap) 05/04/2021    BMI FOLLOWUP  05/05/2024    INFLUENZA VACCINE  08/01/2024    HEMOGLOBIN A1C  09/04/2024    LIPID PANEL  03/04/2025    URINE MICROALBUMIN  03/04/2025    DIABETIC FOOT EXAM  05/23/2025    COLORECTAL CANCER SCREENING  12/28/2026    HEPATITIS C SCREENING  Completed    COVID-19 Vaccine  Completed    Pneumococcal Vaccine 0-64  Completed    ZOSTER VACCINE  Completed    LUNG CANCER SCREENING  Discontinued       Physical Exam  Vitals reviewed.   Constitutional:       Appearance: Normal appearance. He is well-developed.   HENT:      Head: Normocephalic and atraumatic.      Right Ear: External ear normal.      Left Ear: External ear normal.   Eyes:      Conjunctiva/sclera: Conjunctivae normal.      Pupils: Pupils are equal, round, and reactive to light.   Cardiovascular:      Rate and Rhythm: Normal rate and regular rhythm.      Heart sounds: No murmur heard.     No friction rub. No gallop.   Pulmonary:      Effort: Pulmonary effort is normal.      Breath sounds: Normal breath sounds. No wheezing or rhonchi.   Skin:     General: Skin is warm and dry.   Neurological:      Mental Status: He is alert and oriented to person, place, and time.   Psychiatric:         Mood and Affect: Affect normal.         Behavior: Behavior normal.         Thought Content: Thought content normal.        Physical Exam        Result Review :  The following data was reviewed by: Ana Moreno MD on 08/28/2024:         Results             Procedures            Assessment " & Plan  Type 2 diabetes mellitus with diabetic neuropathy, without long-term current use of insulin    Chronic right shoulder pain    Reaction to shot, initial encounter    Neuropathy    Lung nodule    Paroxysmal atrial fibrillation    Hyperlipidemia LDL goal <100       Orders Placed This Encounter   Procedures    CT Chest Without Contrast    Comprehensive Metabolic Panel    Lipid Panel    TSH    MicroAlbumin, Urine, Random - Urine, Clean Catch    Hemoglobin A1c    CBC & Differential     New Medications Ordered This Visit   Medications    hydrocortisone 0.5 % cream     Sig: Apply 1 Application topically to the appropriate area as directed 2 (Two) Times a Day.     Dispense:  15 g     Refill:  1    Tirzepatide (Mounjaro) 12.5 MG/0.5ML solution pen-injector pen     Sig: Inject 0.5 mL under the skin into the appropriate area as directed 1 (One) Time Per Week.     Dispense:  12 mL     Refill:  0    fluconazole (DIFLUCAN) 100 MG tablet     Sig: Take 1 tablet by mouth Daily.     Dispense:  10 tablet     Refill:  0    flecainide (TAMBOCOR) 50 MG tablet     Sig: Take 1 tablet by mouth 2 (Two) Times a Day. NEEDS AN APPOINTMENT FOR ANY FUTURE REFILLS     Dispense:  180 tablet     Refill:  0          Assessment & Plan  1. Shoulder Pain.  He reports no current pain in the shoulder and prefers to avoid a steroid injection today. He is advised to call and schedule an appointment if the pain returns. Excessive use of steroids can cause problems, so it is best to avoid unnecessary injections.    2. Injection Site Reaction.  He experiences bruising and itching at the injection site. It is recommended to apply a cold pack or ice to the area for 5 to 20 minutes before administering the injection to reduce reactions. He can take Benadryl at night to help with itching and use hydrocortisone cream. A prescription for hydrocortisone cream will be sent to his pharmacy.    3. Diabetes Mellitus.  His blood sugar levels have improved, with  recent readings in the 160s and 170s. Blood work will be conducted today to monitor his sugar levels. He is advised to continue with his current medication regimen.    4. Neuropathy.  He reports improvement in neuropathy symptoms with the use of gabapentin and over-the-counter cream. He is advised to continue soaking his feet and using the cream as needed.    5. Yeast Infections.  He occasionally experiences yeast infections and uses a cream and Diflucan as needed. A refill for Diflucan will be provided.    6. Atrial Fibrillation.  He has not visited his cardiologist recently and canceled his 1-year appointment. He is advised to see his cardiologist once a year. A prescription for Flecainide will be sent for a 90-day supply.    7. Lung nodule  He is due for a CT scan to monitor a previously identified lung nodule. The scan is not urgent but should be done annually. It is suggested to schedule the CT scan for December 2024.      Patient or patient representative verbalized consent for the use of Ambient Listening during the visit with  Ana Moreno MD for chart documentation. 8/28/2024  09:15 EDT      FOLLOW UP  Return in about 3 months (around 11/28/2024).  Patient was given instructions and counseling regarding his condition or for health maintenance advice. Please see specific information pulled into the AVS if appropriate.     Ana Moreno MD  08/28/24  09:17 EDT    CURRENT & DISCONTINUED MEDICATIONS  Current Outpatient Medications   Medication Instructions    Accu-Chek Guide test strip USE TO TEST BLOOD SUGAR THREE TIMES DAILY AS NEEDED. USE AS DIRECTED    Accu-Chek Softclix Lancets lancets USE AS DIRECTED THREE TIMES DAILY    acetaminophen (TYLENOL) 500 mg, Oral, Every 6 Hours PRN    atorvastatin (LIPITOR) 20 MG tablet Take 1 tablet by mouth once daily    betamethasone dipropionate 0.05 % lotion APPLY TOPICALLY TO THE APPROPRIATE AREA AS DIRECTED TWO TIMES PER DAY    buPROPion SR (WELLBUTRIN SR) 100  MG 12 hr tablet TAKE 1  BY MOUTH TWICE DAILY    CBD oil (cannabidiol) capsule 1 capsule, Oral, 2 Times Daily    cetirizine (ZYRTEC) 10 mg, Oral, Daily    clindamycin (CLINDAGEL) 1 % gel 1 application , Topical, 2 Times Daily    cyclobenzaprine (FLEXERIL) 10 mg, Oral, 3 Times Daily PRN, for muscle spams    dexlansoprazole (DEXILANT) 60 mg, Oral, Daily    Diclofenac Sodium (VOLTAREN) 1 % gel gel Topical, 4 Times Daily PRN    flecainide (TAMBOCOR) 50 mg, Oral, 2 Times Daily, NEEDS AN APPOINTMENT FOR ANY FUTURE REFILLS    fluconazole (DIFLUCAN) 100 mg, Oral, Daily    gabapentin (NEURONTIN) 300 mg, Oral, 3 Times Daily    glucose monitor monitoring kit 1 each, Does not apply, 2 Times Daily PRN, DX: E11.9    hydrocortisone 0.5 % cream 1 Application, Topical, 2 Times Daily    ketoconazole (NIZORAL) 2 % cream Topical, Daily    ketoconazole (NIZORAL) 2 % shampoo APPLY TOPICALLY TO THE APPROPRIATE AREA AS DIRECTED TWO TIMES PER WEEK.    montelukast (SINGULAIR) 10 MG tablet TAKE 1 TABLET BY MOUTH ONCE DAILY IN THE EVENING    Synjardy 12.5-500 MG tablet 1 tablet, Oral, 2 Times Daily Before Meals    tadalafil (CIALIS) 20 mg, Oral, Daily PRN    Tirzepatide (MOUNJARO) 12.5 mg, Subcutaneous, Weekly    triamcinolone (KENALOG) 0.5 % ointment APPLY OINTMENT TOPICALLY TO THE APPROPRIATE AREA TWICE DAILY AS DIRECTED       Medications Discontinued During This Encounter   Medication Reason    docusate sodium (COLACE) 100 MG capsule *Therapy completed    Tirzepatide (MOUNJARO) 15 MG/0.5ML solution pen-injector pen     Tirzepatide (Mounjaro) 12.5 MG/0.5ML solution pen-injector pen     fluconazole (DIFLUCAN) 100 MG tablet Reorder    flecainide (TAMBOCOR) 50 MG tablet Reorder

## 2024-09-04 RX ORDER — BENZOCAINE/MENTHOL 6 MG-10 MG
1 LOZENGE MUCOUS MEMBRANE 2 TIMES DAILY
Qty: 20 G | Refills: 1 | Status: SHIPPED | OUTPATIENT
Start: 2024-09-04

## 2024-09-06 RX ORDER — KETOCONAZOLE 20 MG/G
CREAM TOPICAL
Qty: 60 G | Refills: 0 | Status: SHIPPED | OUTPATIENT
Start: 2024-09-06

## 2024-09-16 RX ORDER — TADALAFIL 20 MG/1
20 TABLET ORAL DAILY PRN
Qty: 90 TABLET | Refills: 0 | Status: SHIPPED | OUTPATIENT
Start: 2024-09-16

## 2024-09-16 RX ORDER — CYCLOBENZAPRINE HCL 10 MG
10 TABLET ORAL 3 TIMES DAILY PRN
Qty: 90 TABLET | Refills: 0 | Status: SHIPPED | OUTPATIENT
Start: 2024-09-16

## 2024-09-16 RX ORDER — ATORVASTATIN CALCIUM 20 MG/1
TABLET, FILM COATED ORAL
Qty: 90 TABLET | Refills: 0 | Status: SHIPPED | OUTPATIENT
Start: 2024-09-16

## 2024-09-30 NOTE — TELEPHONE ENCOUNTER
Last follow up visit date: 8/28/24    Last urine drug screen date: none on file    Last consent/contract date: none on file    Does patient utilize [facility] pharmacy (yes or no)? no

## 2024-10-01 RX ORDER — GABAPENTIN 300 MG/1
300 CAPSULE ORAL 3 TIMES DAILY
Qty: 90 CAPSULE | Refills: 0 | Status: SHIPPED | OUTPATIENT
Start: 2024-10-01

## 2024-10-01 RX ORDER — BETAMETHASONE DIPROPIONATE 0.5 MG/G
LOTION TOPICAL
Qty: 60 ML | Refills: 0 | Status: SHIPPED | OUTPATIENT
Start: 2024-10-01

## 2024-10-08 DIAGNOSIS — K21.9 GASTROESOPHAGEAL REFLUX DISEASE WITHOUT ESOPHAGITIS: ICD-10-CM

## 2024-10-08 RX ORDER — DEXLANSOPRAZOLE 60 MG/1
CAPSULE, DELAYED RELEASE ORAL DAILY
Qty: 90 CAPSULE | Refills: 0 | Status: SHIPPED | OUTPATIENT
Start: 2024-10-08

## 2024-10-15 RX ORDER — KETOCONAZOLE 20 MG/ML
SHAMPOO TOPICAL
Qty: 120 ML | Refills: 0 | Status: SHIPPED | OUTPATIENT
Start: 2024-10-15

## 2024-10-15 RX ORDER — MONTELUKAST SODIUM 10 MG/1
TABLET ORAL
Qty: 90 TABLET | Refills: 0 | Status: SHIPPED | OUTPATIENT
Start: 2024-10-15

## 2024-10-28 RX ORDER — BETAMETHASONE DIPROPIONATE 0.5 MG/G
LOTION TOPICAL
Qty: 60 ML | Refills: 0 | Status: SHIPPED | OUTPATIENT
Start: 2024-10-28

## 2024-11-14 RX ORDER — GABAPENTIN 300 MG/1
300 CAPSULE ORAL 3 TIMES DAILY
Qty: 90 CAPSULE | Refills: 0 | Status: SHIPPED | OUTPATIENT
Start: 2024-11-14

## 2024-11-14 NOTE — TELEPHONE ENCOUNTER
Last follow up visit date: 8/28/24    Last urine drug screen date: none on file    Last consent/contract date: none on file    Does patient utilize [facility] pharmacy (yes or no)? no

## 2024-11-25 ENCOUNTER — OFFICE VISIT (OUTPATIENT)
Dept: INTERNAL MEDICINE | Facility: CLINIC | Age: 64
End: 2024-11-25
Payer: COMMERCIAL

## 2024-11-25 VITALS
RESPIRATION RATE: 20 BRPM | HEART RATE: 78 BPM | WEIGHT: 238.6 LBS | TEMPERATURE: 97.9 F | DIASTOLIC BLOOD PRESSURE: 68 MMHG | SYSTOLIC BLOOD PRESSURE: 128 MMHG | HEIGHT: 72 IN | OXYGEN SATURATION: 95 % | BODY MASS INDEX: 32.32 KG/M2

## 2024-11-25 DIAGNOSIS — E78.5 HYPERLIPIDEMIA LDL GOAL <100: ICD-10-CM

## 2024-11-25 DIAGNOSIS — I48.0 PAROXYSMAL ATRIAL FIBRILLATION: Primary | ICD-10-CM

## 2024-11-25 DIAGNOSIS — E11.40 TYPE 2 DIABETES MELLITUS WITH DIABETIC NEUROPATHY, WITHOUT LONG-TERM CURRENT USE OF INSULIN: ICD-10-CM

## 2024-11-25 DIAGNOSIS — N52.9 ERECTILE DYSFUNCTION, UNSPECIFIED ERECTILE DYSFUNCTION TYPE: ICD-10-CM

## 2024-11-25 LAB
ALBUMIN SERPL-MCNC: 4.4 G/DL (ref 3.5–5.2)
ALBUMIN/GLOB SERPL: 1.6 G/DL
ALP SERPL-CCNC: 76 U/L (ref 39–117)
ALT SERPL W P-5'-P-CCNC: 26 U/L (ref 1–41)
ANION GAP SERPL CALCULATED.3IONS-SCNC: 10 MMOL/L (ref 5–15)
AST SERPL-CCNC: 17 U/L (ref 1–40)
BASOPHILS # BLD AUTO: 0.09 10*3/MM3 (ref 0–0.2)
BASOPHILS NFR BLD AUTO: 1 % (ref 0–1.5)
BILIRUB SERPL-MCNC: 0.4 MG/DL (ref 0–1.2)
BUN SERPL-MCNC: 14 MG/DL (ref 8–23)
BUN/CREAT SERPL: 12.5 (ref 7–25)
CALCIUM SPEC-SCNC: 10.4 MG/DL (ref 8.6–10.5)
CHLORIDE SERPL-SCNC: 98 MMOL/L (ref 98–107)
CHOLEST SERPL-MCNC: 179 MG/DL (ref 0–200)
CO2 SERPL-SCNC: 23 MMOL/L (ref 22–29)
CREAT SERPL-MCNC: 1.12 MG/DL (ref 0.76–1.27)
DEPRECATED RDW RBC AUTO: 39.2 FL (ref 37–54)
EGFRCR SERPLBLD CKD-EPI 2021: 73.4 ML/MIN/1.73
EOSINOPHIL # BLD AUTO: 0.2 10*3/MM3 (ref 0–0.4)
EOSINOPHIL NFR BLD AUTO: 2.3 % (ref 0.3–6.2)
ERYTHROCYTE [DISTWIDTH] IN BLOOD BY AUTOMATED COUNT: 12.7 % (ref 12.3–15.4)
GLOBULIN UR ELPH-MCNC: 2.7 GM/DL
GLUCOSE SERPL-MCNC: 139 MG/DL (ref 65–99)
HCT VFR BLD AUTO: 53.5 % (ref 37.5–51)
HDLC SERPL-MCNC: 46 MG/DL (ref 40–60)
HGB BLD-MCNC: 18.7 G/DL (ref 13–17.7)
IMM GRANULOCYTES # BLD AUTO: 0.03 10*3/MM3 (ref 0–0.05)
IMM GRANULOCYTES NFR BLD AUTO: 0.3 % (ref 0–0.5)
LDLC SERPL CALC-MCNC: 99 MG/DL (ref 0–100)
LDLC/HDLC SERPL: 2.03 {RATIO}
LYMPHOCYTES # BLD AUTO: 2.19 10*3/MM3 (ref 0.7–3.1)
LYMPHOCYTES NFR BLD AUTO: 25.5 % (ref 19.6–45.3)
MCH RBC QN AUTO: 30.1 PG (ref 26.6–33)
MCHC RBC AUTO-ENTMCNC: 35 G/DL (ref 31.5–35.7)
MCV RBC AUTO: 86 FL (ref 79–97)
MONOCYTES # BLD AUTO: 0.72 10*3/MM3 (ref 0.1–0.9)
MONOCYTES NFR BLD AUTO: 8.4 % (ref 5–12)
NEUTROPHILS NFR BLD AUTO: 5.35 10*3/MM3 (ref 1.7–7)
NEUTROPHILS NFR BLD AUTO: 62.5 % (ref 42.7–76)
NRBC BLD AUTO-RTO: 0 /100 WBC (ref 0–0.2)
PLATELET # BLD AUTO: 227 10*3/MM3 (ref 140–450)
PMV BLD AUTO: 10.4 FL (ref 6–12)
POTASSIUM SERPL-SCNC: 4.5 MMOL/L (ref 3.5–5.2)
PROT SERPL-MCNC: 7.1 G/DL (ref 6–8.5)
RBC # BLD AUTO: 6.22 10*6/MM3 (ref 4.14–5.8)
SODIUM SERPL-SCNC: 131 MMOL/L (ref 136–145)
TRIGL SERPL-MCNC: 199 MG/DL (ref 0–150)
TSH SERPL DL<=0.05 MIU/L-ACNC: 1.51 UIU/ML (ref 0.27–4.2)
VLDLC SERPL-MCNC: 34 MG/DL (ref 5–40)
WBC NRBC COR # BLD AUTO: 8.58 10*3/MM3 (ref 3.4–10.8)

## 2024-11-25 PROCEDURE — 80050 GENERAL HEALTH PANEL: CPT | Performed by: INTERNAL MEDICINE

## 2024-11-25 PROCEDURE — 90471 IMMUNIZATION ADMIN: CPT | Performed by: INTERNAL MEDICINE

## 2024-11-25 PROCEDURE — 84402 ASSAY OF FREE TESTOSTERONE: CPT | Performed by: INTERNAL MEDICINE

## 2024-11-25 PROCEDURE — 90715 TDAP VACCINE 7 YRS/> IM: CPT | Performed by: INTERNAL MEDICINE

## 2024-11-25 PROCEDURE — 99214 OFFICE O/P EST MOD 30 MIN: CPT | Performed by: INTERNAL MEDICINE

## 2024-11-25 PROCEDURE — 84403 ASSAY OF TOTAL TESTOSTERONE: CPT | Performed by: INTERNAL MEDICINE

## 2024-11-25 PROCEDURE — 80061 LIPID PANEL: CPT | Performed by: INTERNAL MEDICINE

## 2024-11-25 RX ORDER — MONTELUKAST SODIUM 10 MG/1
10 TABLET ORAL EVERY EVENING
Qty: 90 TABLET | Refills: 0 | Status: SHIPPED | OUTPATIENT
Start: 2024-11-25

## 2024-11-25 RX ORDER — FLECAINIDE ACETATE 50 MG/1
50 TABLET ORAL 2 TIMES DAILY
Qty: 180 TABLET | Refills: 0 | Status: SHIPPED | OUTPATIENT
Start: 2024-11-25

## 2024-11-25 RX ORDER — CYCLOBENZAPRINE HCL 10 MG
10 TABLET ORAL 3 TIMES DAILY PRN
Qty: 90 TABLET | Refills: 0 | Status: SHIPPED | OUTPATIENT
Start: 2024-11-25

## 2024-11-25 RX ORDER — KETOCONAZOLE 20 MG/ML
SHAMPOO, SUSPENSION TOPICAL 2 TIMES WEEKLY
Qty: 120 ML | Refills: 0 | Status: SHIPPED | OUTPATIENT
Start: 2024-11-25

## 2024-11-25 NOTE — PROGRESS NOTES
Chief Complaint  Diabetes (3 mo f/u) and Hyperlipidemia      Subjective      History of Present Illness  The patient presents for evaluation of multiple medical concerns.    He reports that his blood sugar levels are stable and he requires a refill of his medications. He has stopped taking his stomach medication, which he only took once in the past month. He denies experiencing chest pain or breathing difficulties.    He has been gaining weight and feels bloated. His diet includes hot chocolate, Ritz, and beef jerky.    He has noticed black spots on his arm and leg, which have caused itching. He has been avoiding injections in his abdomen due to itching and bruising, although he believes the medication is more effective when administered in the abdomen. He received an injection last week, which did not cause any discomfort.    He has a foot specialist appointment scheduled for this afternoon. He has been experiencing foot pain, which he describes as a stabbing sensation. He has been using gabapentin inconsistently and has been applying a cream to his feet. He has been wearing slippers instead of boots due to the pain.    He has a chest scan scheduled for 12/09/2024, which he would like to postpone.    He has been taking Wellbutrin for stress and has found it helpful. He has been experiencing erectile dysfunction, which he attributes to his medications. He has been taking tadalafil 5 out of 7 days a week, but is unsure of its effectiveness. He has been studying testosterone and has previously used a testosterone cream.    He has had an ablation procedure in the past. He has been taking Flexeril as needed for pain relief and has found it effective. He has also been using a muscle relaxer as needed and has found it effective.    SOCIAL HISTORY  He drinks a shot of bourbon every now and then.         Objective   Vital Signs:   Vitals:    11/25/24 0915   BP: 128/68   BP Location: Left arm   Patient Position: Sitting  "  Cuff Size: Adult   Pulse: 78   Resp: 20   Temp: 97.9 °F (36.6 °C)   TempSrc: Temporal   SpO2: 95%   Weight: 108 kg (238 lb 9.6 oz)   Height: 182.9 cm (72.01\")     Body mass index is 32.35 kg/m².    Wt Readings from Last 3 Encounters:   11/25/24 108 kg (238 lb 9.6 oz)   08/28/24 105 kg (230 lb 6.4 oz)   06/06/24 105 kg (230 lb 12.8 oz)     BP Readings from Last 3 Encounters:   11/25/24 128/68   08/28/24 120/72   06/06/24 110/68       Health Maintenance   Topic Date Due    DIABETIC EYE EXAM  Never done    ANNUAL PHYSICAL  Never done    TDAP/TD VACCINES (2 - Td or Tdap) 05/04/2021    COVID-19 Vaccine (5 - 2024-25 season) 09/01/2024    HEMOGLOBIN A1C  02/28/2025    DIABETIC FOOT EXAM  05/23/2025    LIPID PANEL  08/28/2025    URINE MICROALBUMIN  08/28/2025    BMI FOLLOWUP  11/25/2025    COLORECTAL CANCER SCREENING  12/28/2026    HEPATITIS C SCREENING  Completed    Pneumococcal Vaccine 0-64  Completed    INFLUENZA VACCINE  Completed    ZOSTER VACCINE  Completed    LUNG CANCER SCREENING  Discontinued       Physical Exam  Vitals reviewed.   Constitutional:       Appearance: Normal appearance. He is well-developed.   HENT:      Head: Normocephalic and atraumatic.      Right Ear: External ear normal.      Left Ear: External ear normal.   Eyes:      Conjunctiva/sclera: Conjunctivae normal.      Pupils: Pupils are equal, round, and reactive to light.   Cardiovascular:      Rate and Rhythm: Normal rate and regular rhythm.      Heart sounds: No murmur heard.     No friction rub. No gallop.   Pulmonary:      Effort: Pulmonary effort is normal.      Breath sounds: Normal breath sounds. No wheezing or rhonchi.   Skin:     General: Skin is warm and dry.   Neurological:      Mental Status: He is alert and oriented to person, place, and time.   Psychiatric:         Mood and Affect: Affect normal.         Behavior: Behavior normal.         Thought Content: Thought content normal.        Physical Exam        Result Review :  The " following data was reviewed by: Ana Moreno MD on 11/25/2024:         Results  Laboratory Studies  A1c was 7.5 two months ago.           Procedures            Assessment & Plan  Paroxysmal atrial fibrillation    Orders:    Lipid Panel    Hyperlipidemia LDL goal <100       Orders:    Lipid Panel    Type 2 diabetes mellitus with diabetic neuropathy, without long-term current use of insulin      Orders:    Comprehensive Metabolic Panel    CBC & Differential    TSH    Lipid Panel    Erectile dysfunction, unspecified erectile dysfunction type    Orders:    Testosterone, Free, Total         Assessment & Plan  1. Plantar Fasciitis.  The symptoms described, including significant discomfort and growths on the feet, suggest plantar fasciitis. He was advised that a steroid shot might help alleviate the inflammation. He is currently using gabapentin and topical creams, which provide temporary relief. He was encouraged to continue these treatments and to consult with a foot specialist for further management.    2. Diabetes Mellitus.  His A1c levels have shown a significant decrease from 11.3 to 7.5 over the past 11 months, indicating good control. He was advised to continue his current medication regimen and dietary habits. If blood sugar levels increase or weight gain becomes a concern, the dosage of Mounjaro may be increased. His sugar levels will be checked during this visit.    3. Erectile Dysfunction.  Flecainide, one of his medications, could potentially be contributing to his erectile dysfunction. He was advised to consult with his cardiologist regarding the possibility of discontinuing or reducing flecainide to see if they think it is contributing. His testosterone levels will be checked today to determine if testosterone therapy might be beneficial. If his levels are less than 300, treatment may be considered.    4. Medication Management.  Prescriptions for Singulair, ketoconazole shampoo, flecainide, and  Flexeril were refilled. He was advised to continue his current medication regimen. He uses Flexeril occasionally for muscle pain.    5. Health Maintenance.  A tetanus vaccine was administered during this visit as his last one was in 2011, and it is recommended every 10 years.      Patient or patient representative verbalized consent for the use of Ambient Listening during the visit with  Ana Moreno MD for chart documentation. 11/25/2024  10:08 EST      FOLLOW UP  Return in about 4 months (around 3/25/2025).  Patient was given instructions and counseling regarding his condition or for health maintenance advice. Please see specific information pulled into the AVS if appropriate.     Ana Moreno MD  11/25/24  10:27 EST    CURRENT & DISCONTINUED MEDICATIONS  Current Outpatient Medications   Medication Instructions    Accu-Chek Guide test strip USE TO TEST BLOOD SUGAR THREE TIMES DAILY AS NEEDED. USE AS DIRECTED    Accu-Chek Softclix Lancets lancets USE AS DIRECTED THREE TIMES DAILY    atorvastatin (LIPITOR) 20 MG tablet Take 1 tablet by mouth once daily    betamethasone dipropionate 0.05 % lotion APPLY TOPICALLY TO THE APPROPRIATE AREA AS DIRECTED TWO TIMES PER DAY    buPROPion SR (WELLBUTRIN SR) 100 MG 12 hr tablet TAKE 1  BY MOUTH TWICE DAILY    CBD oil (cannabidiol) capsule 1 capsule, 2 Times Daily    clindamycin (CLINDAGEL) 1 % gel 1 application , Topical, 2 Times Daily    cyclobenzaprine (FLEXERIL) 10 mg, Oral, 3 Times Daily PRN, for muscle spams    Diclofenac Sodium (VOLTAREN) 1 % gel gel Topical, 4 Times Daily PRN    flecainide (TAMBOCOR) 50 mg, Oral, 2 Times Daily, NEEDS AN APPOINTMENT FOR ANY FUTURE REFILLS    fluconazole (DIFLUCAN) 100 mg, Oral, Daily    gabapentin (NEURONTIN) 300 mg, Oral, 3 Times Daily    glucose monitor monitoring kit 1 each, Not Applicable, 2 Times Daily PRN, DX: E11.9    hydrocortisone 1 % cream 1 Application, Topical, 2 Times Daily    ketoconazole (NIZORAL) 2 % cream APPLY   CREAM TOPICALLY TO AFFECTED AREA ONCE DAILY    ketoconazole (NIZORAL) 2 % shampoo Topical, 2 Times Weekly    montelukast (SINGULAIR) 10 mg, Oral, Every Evening    Synjardy 12.5-500 MG tablet 1 tablet, Oral, 2 Times Daily Before Meals    tadalafil (CIALIS) 20 mg, Oral, Daily PRN    Tirzepatide (MOUNJARO) 12.5 mg, Subcutaneous, Weekly    triamcinolone (KENALOG) 0.5 % ointment APPLY OINTMENT TOPICALLY TO THE APPROPRIATE AREA TWICE DAILY AS DIRECTED       Medications Discontinued During This Encounter   Medication Reason    flecainide (TAMBOCOR) 50 MG tablet Reorder    cyclobenzaprine (FLEXERIL) 10 MG tablet Reorder    montelukast (SINGULAIR) 10 MG tablet Reorder    ketoconazole (NIZORAL) 2 % shampoo Reorder    acetaminophen (TYLENOL) 500 MG tablet     cetirizine (zyrTEC) 10 MG tablet     dexlansoprazole (DEXILANT) 60 MG capsule

## 2024-11-26 DIAGNOSIS — E87.1 HYPONATREMIA: Primary | ICD-10-CM

## 2024-11-26 DIAGNOSIS — D75.1 POLYCYTHEMIA: ICD-10-CM

## 2024-11-28 LAB
TESTOST FREE SERPL-MCNC: 4.6 PG/ML (ref 6.6–18.1)
TESTOST SERPL-MCNC: 406 NG/DL (ref 264–916)

## 2024-12-02 RX ORDER — BETAMETHASONE DIPROPIONATE 0.5 MG/G
LOTION TOPICAL
Qty: 60 ML | Refills: 0 | Status: SHIPPED | OUTPATIENT
Start: 2024-12-02

## 2024-12-23 ENCOUNTER — OFFICE VISIT (OUTPATIENT)
Dept: CARDIOLOGY | Facility: CLINIC | Age: 64
End: 2024-12-23
Payer: COMMERCIAL

## 2024-12-23 VITALS
WEIGHT: 245 LBS | BODY MASS INDEX: 33.18 KG/M2 | SYSTOLIC BLOOD PRESSURE: 120 MMHG | HEIGHT: 72 IN | DIASTOLIC BLOOD PRESSURE: 68 MMHG | HEART RATE: 78 BPM | OXYGEN SATURATION: 97 %

## 2024-12-23 DIAGNOSIS — I20.9 ANGINA PECTORIS: Primary | ICD-10-CM

## 2024-12-23 DIAGNOSIS — R00.2 PALPITATIONS: ICD-10-CM

## 2024-12-23 PROCEDURE — 99214 OFFICE O/P EST MOD 30 MIN: CPT | Performed by: NURSE PRACTITIONER

## 2024-12-23 PROCEDURE — 93000 ELECTROCARDIOGRAM COMPLETE: CPT | Performed by: NURSE PRACTITIONER

## 2024-12-23 NOTE — PROGRESS NOTES
Date of Office Visit: 2024  Encounter Provider: VENITA Jensen  Place of Service: [facility] CARDIOLOGY  Patient Name: Dallas Hurt  :1960    Chief complaint: Atrial fibrillation    HPI: Dallas Hurt is a 64 y.o. male who is a patient of Dr. Iniguez and is new to me today.  He has a history of atrial fibrillation underwent ablation in the past.  He had then been tried on Tikosyn and sotalol.  We switched him off that onto flecainide and he has done well.  At his last visit which was in  of last year he had not had any recurrence of palpitations.    He comes in today for follow-up.  He is lost about 90 pounds in the last year.  He recently gained some back but is trying to keep it off.  He recently got a rowing machine and a stepper.  His last LDL was 99, HDL of 46.  He denies any chest pain, pressure or tightness he has not had any palpitations he is compliant with flecainide.  He has had some difficulty with erectile dysfunction.  He was concerned it was from flecainide.  I think it is multifactorial.  His hemoglobin A1c was 9.9 in March of this year last 1 in August was 7.5.  He also is concerned about his testosterone level of 406 and wants to know if he can take testosterone supplements.  Thyroid was normal.  Previous testing and notes have been reviewed by me.   Past Medical History:   Diagnosis Date    Atrial fibrillation     Atrial flutter     Diabetes mellitus     Hyperlipidemia     Ingrown toenail     Low testosterone        Past Surgical History:   Procedure Laterality Date    ABLATION OF DYSRHYTHMIC FOCUS      COLONOSCOPY         Social History     Socioeconomic History    Marital status:    Tobacco Use    Smoking status: Never     Passive exposure: Yes    Smokeless tobacco: Never    Tobacco comments:     CAFFEINE USE: 2 CUPS COFFEE DAILY/ 2 LITER COKE ZERO DAILY   Vaping Use    Vaping status: Never Used   Substance and Sexual Activity     Alcohol use: Yes     Alcohol/week: 3.0 standard drinks of alcohol     Types: 3 Shots of liquor per week    Drug use: No    Sexual activity: Defer       Family History   Problem Relation Age of Onset    Diabetes Other     Diabetes Mother     Atrial fibrillation Father     Diabetes Father     Cancer Father     Atrial fibrillation Brother     Hypertension Brother     Aneurysm Paternal Grandmother     No Known Problems Maternal Grandmother     No Known Problems Maternal Grandfather     No Known Problems Paternal Grandfather        Review of Systems   Constitutional: Negative for diaphoresis and malaise/fatigue.   Cardiovascular:  Negative for chest pain, claudication, dyspnea on exertion, irregular heartbeat, leg swelling, near-syncope, orthopnea, palpitations, paroxysmal nocturnal dyspnea and syncope.   Respiratory:  Negative for cough, shortness of breath and sleep disturbances due to breathing.    Musculoskeletal:  Negative for falls.   Neurological:  Negative for dizziness and weakness.   Psychiatric/Behavioral:  Negative for altered mental status and substance abuse.        Allergies   Allergen Reactions    Morphine And Codeine          Current Outpatient Medications:     Accu-Chek Guide test strip, USE TO TEST BLOOD SUGAR THREE TIMES DAILY AS NEEDED. USE AS DIRECTED, Disp: 100 each, Rfl: 0    Accu-Chek Softclix Lancets lancets, USE AS DIRECTED THREE TIMES DAILY, Disp: 100 each, Rfl: 0    atorvastatin (LIPITOR) 20 MG tablet, Take 1 tablet by mouth once daily, Disp: 90 tablet, Rfl: 0    betamethasone dipropionate 0.05 % lotion, APPLY TOPICALLY TO THE APPROPRIATE AREA AS DIRECTED TWO TIMES PER DAY, Disp: 60 mL, Rfl: 0    buPROPion SR (WELLBUTRIN SR) 100 MG 12 hr tablet, TAKE 1  BY MOUTH TWICE DAILY, Disp: 180 tablet, Rfl: 1    CBD oil (cannabidiol) capsule, Take 1 capsule by mouth 2 (Two) Times a Day., Disp: , Rfl:     clindamycin (CLINDAGEL) 1 % gel, Apply 1 application topically to the appropriate area as directed  2 (Two) Times a Day., Disp: 60 g, Rfl: 0    cyclobenzaprine (FLEXERIL) 10 MG tablet, Take 1 tablet by mouth 3 (Three) Times a Day As Needed for Muscle Spasms. for muscle spams, Disp: 90 tablet, Rfl: 0    Diclofenac Sodium (VOLTAREN) 1 % gel gel, Apply  topically to the appropriate area as directed 4 (Four) Times a Day As Needed (USE 4 TIMES DAILY AS  NEEDED FOR PAIN)., Disp: 350 g, Rfl: 1    flecainide (TAMBOCOR) 50 MG tablet, Take 1 tablet by mouth 2 (Two) Times a Day. NEEDS AN APPOINTMENT FOR ANY FUTURE REFILLS, Disp: 180 tablet, Rfl: 0    gabapentin (NEURONTIN) 300 MG capsule, TAKE 1 CAPSULE BY MOUTH THREE TIMES DAILY, Disp: 90 capsule, Rfl: 0    glucose monitor monitoring kit, 1 each 2 (Two) Times a Day As Needed (to test blood sugar). DX: E11.9, Disp: 1 each, Rfl: 0    hydrocortisone 1 % cream, Apply 1 Application topically to the appropriate area as directed 2 (Two) Times a Day., Disp: 20 g, Rfl: 1    ketoconazole (NIZORAL) 2 % cream, APPLY  CREAM TOPICALLY TO AFFECTED AREA ONCE DAILY, Disp: 60 g, Rfl: 0    ketoconazole (NIZORAL) 2 % shampoo, Apply  topically to the appropriate area as directed 2 (Two) Times a Week., Disp: 120 mL, Rfl: 0    montelukast (SINGULAIR) 10 MG tablet, Take 1 tablet by mouth Every Evening., Disp: 90 tablet, Rfl: 0    Synjardy 12.5-500 MG tablet, TAKE 1 TABLET BY MOUTH TWICE DAILY BEFORE MEAL(S), Disp: 180 tablet, Rfl: 0    tadalafil (CIALIS) 20 MG tablet, TAKE 1 TABLET BY MOUTH ONCE DAILY AS NEEDED FOR ERECTILE DYSFUNCTION, Disp: 90 tablet, Rfl: 0    Tirzepatide (Mounjaro) 12.5 MG/0.5ML solution pen-injector pen, Inject 0.5 mL under the skin into the appropriate area as directed 1 (One) Time Per Week., Disp: 12 mL, Rfl: 0    triamcinolone (KENALOG) 0.5 % ointment, APPLY OINTMENT TOPICALLY TO THE APPROPRIATE AREA TWICE DAILY AS DIRECTED, Disp: 15 g, Rfl: 0    fluconazole (DIFLUCAN) 100 MG tablet, Take 1 tablet by mouth Daily., Disp: 10 tablet, Rfl: 0        Objective:     Vitals:     "12/23/24 1516   BP: 120/68   BP Location: Left arm   Patient Position: Sitting   Pulse: 78   SpO2: 97%   Weight: 111 kg (245 lb)   Height: 182.9 cm (72.01\")     Body mass index is 33.22 kg/m².    PHYSICAL EXAM:    Constitutional:       General: Not in acute distress.     Appearance: Normal appearance. Well-developed.   Eyes:      Pupils: Pupils are equal, round, and reactive to light.   HENT:      Head: Normocephalic.   Neck:      Vascular: No carotid bruit or JVD.   Pulmonary:      Effort: Pulmonary effort is normal. No tachypnea.      Breath sounds: Normal breath sounds. No wheezing. No rales.   Cardiovascular:      Normal rate. Regular rhythm.      No gallop.    Pulses:     Intact distal pulses.   Edema:     Peripheral edema absent.   Abdominal:      General: Bowel sounds are normal.      Palpations: Abdomen is soft.      Tenderness: There is no abdominal tenderness.   Musculoskeletal: Normal range of motion.      Cervical back: Normal range of motion and neck supple. No edema. Skin:     General: Skin is warm and dry.   Neurological:      Mental Status: Alert and oriented to person, place, and time.           ECG 12 Lead    Date/Time: 12/23/2024 3:32 PM  Performed by: Fatmata Motley APRN    Authorized by: Fatmata Motley APRN  Comparison: compared with previous ECG from 6/28/2023  Similar to previous ECG  Rhythm: sinus rhythm  Rate: normal  QRS axis: normal    Clinical impression: normal ECG      Lipid Panel          3/4/2024    11:53 8/28/2024    09:37 11/25/2024    10:48   Lipid Panel   Total Cholesterol 173  184  179    Triglycerides 211  204  199    HDL Cholesterol 44  43  46    VLDL Cholesterol 36  35  34    LDL Cholesterol  93  106  99    LDL/HDL Ratio 1.97  2.33  2.03          Assessment/Plan:      1.  Paroxysmal atrial fibrillation-would continue flecainide 50 mg twice a day.  Not on anticoagulation due to no recurrence of A-fib on antiarrhythmic therapy.    2.  Dyslipidemia LDL 99 HDL 46, " borderline continue atorvastatin 20 mg daily    3.  Obesity with BMI of 33-continue with exercise and dietary modifications.    Follow-up in 1 year         Your medication list            Accurate as of December 23, 2024  3:30 PM. If you have any questions, ask your nurse or doctor.                CONTINUE taking these medications        Instructions Last Dose Given Next Dose Due   Accu-Chek Guide test strip  Generic drug: glucose blood      USE TO TEST BLOOD SUGAR THREE TIMES DAILY AS NEEDED. USE AS DIRECTED       Accu-Chek Softclix Lancets lancets      USE AS DIRECTED THREE TIMES DAILY       atorvastatin 20 MG tablet  Commonly known as: LIPITOR      Take 1 tablet by mouth once daily       betamethasone dipropionate 0.05 % lotion      APPLY TOPICALLY TO THE APPROPRIATE AREA AS DIRECTED TWO TIMES PER DAY       buPROPion  MG 12 hr tablet  Commonly known as: WELLBUTRIN SR      TAKE 1  BY MOUTH TWICE DAILY       CBD oil capsule  Commonly known as: cannabidiol      Take 1 capsule by mouth 2 (Two) Times a Day.       clindamycin 1 % gel      Apply 1 application topically to the appropriate area as directed 2 (Two) Times a Day.       cyclobenzaprine 10 MG tablet  Commonly known as: FLEXERIL      Take 1 tablet by mouth 3 (Three) Times a Day As Needed for Muscle Spasms. for muscle spams       Diclofenac Sodium 1 % gel gel  Commonly known as: VOLTAREN      Apply  topically to the appropriate area as directed 4 (Four) Times a Day As Needed (USE 4 TIMES DAILY AS  NEEDED FOR PAIN).       flecainide 50 MG tablet  Commonly known as: TAMBOCOR      Take 1 tablet by mouth 2 (Two) Times a Day. NEEDS AN APPOINTMENT FOR ANY FUTURE REFILLS       fluconazole 100 MG tablet  Commonly known as: DIFLUCAN      Take 1 tablet by mouth Daily.       gabapentin 300 MG capsule  Commonly known as: NEURONTIN      TAKE 1 CAPSULE BY MOUTH THREE TIMES DAILY       glucose monitor monitoring kit      1 each 2 (Two) Times a Day As Needed (to test  blood sugar). DX: E11.9       hydrocortisone 1 % cream      Apply 1 Application topically to the appropriate area as directed 2 (Two) Times a Day.       ketoconazole 2 % cream  Commonly known as: NIZORAL      APPLY  CREAM TOPICALLY TO AFFECTED AREA ONCE DAILY       ketoconazole 2 % shampoo  Commonly known as: NIZORAL      Apply  topically to the appropriate area as directed 2 (Two) Times a Week.       montelukast 10 MG tablet  Commonly known as: SINGULAIR      Take 1 tablet by mouth Every Evening.       Synjardy 12.5-500 MG tablet  Generic drug: Empagliflozin-metFORMIN HCl      TAKE 1 TABLET BY MOUTH TWICE DAILY BEFORE MEAL(S)       tadalafil 20 MG tablet  Commonly known as: CIALIS      TAKE 1 TABLET BY MOUTH ONCE DAILY AS NEEDED FOR ERECTILE DYSFUNCTION       Tirzepatide 12.5 MG/0.5ML solution auto-injector  Commonly known as: Mounjaro      Inject 0.5 mL under the skin into the appropriate area as directed 1 (One) Time Per Week.       triamcinolone 0.5 % ointment  Commonly known as: KENALOG      APPLY OINTMENT TOPICALLY TO THE APPROPRIATE AREA TWICE DAILY AS DIRECTED                  As always, it has been a pleasure to participate in your patient's care.      Sincerely,     Fatmata NATHAN

## 2024-12-26 DIAGNOSIS — E11.40 TYPE 2 DIABETES MELLITUS WITH DIABETIC NEUROPATHY, WITHOUT LONG-TERM CURRENT USE OF INSULIN: ICD-10-CM

## 2024-12-28 RX ORDER — GABAPENTIN 300 MG/1
300 CAPSULE ORAL 3 TIMES DAILY
Qty: 90 CAPSULE | Refills: 0 | Status: SHIPPED | OUTPATIENT
Start: 2024-12-28

## 2024-12-28 RX ORDER — EMPAGLIFLOZIN AND METFORMIN HYDROCHLORIDE 12.5; 5 MG/1; MG/1
1 TABLET ORAL
Qty: 180 TABLET | Refills: 0 | Status: SHIPPED | OUTPATIENT
Start: 2024-12-28

## 2024-12-29 DIAGNOSIS — E11.40 TYPE 2 DIABETES MELLITUS WITH DIABETIC NEUROPATHY, WITHOUT LONG-TERM CURRENT USE OF INSULIN: ICD-10-CM

## 2024-12-30 RX ORDER — TIRZEPATIDE 12.5 MG/.5ML
INJECTION, SOLUTION SUBCUTANEOUS
Qty: 12 ML | Refills: 0 | Status: SHIPPED | OUTPATIENT
Start: 2024-12-30

## 2024-12-30 NOTE — TELEPHONE ENCOUNTER
The original prescription was discontinued on 5/24/2024 by Ana Moreno MD for the following reason: Reorder. Renewing this prescription may not be appropriate.

## 2025-01-02 ENCOUNTER — PRIOR AUTHORIZATION (OUTPATIENT)
Dept: INTERNAL MEDICINE | Facility: CLINIC | Age: 65
End: 2025-01-02
Payer: COMMERCIAL

## 2025-01-02 NOTE — TELEPHONE ENCOUNTER
Mounjaro 12.5MG/0.5ML auto-injectors    As long as you remain covered by your prescription drug plan and there are no changes to your  plan benefits, this request is approved from 01/02/2025 to 01/02/2026. When this approval  expires, please speak to your doctor about your treatment

## 2025-01-03 RX ORDER — ATORVASTATIN CALCIUM 20 MG/1
TABLET, FILM COATED ORAL
Qty: 90 TABLET | Refills: 0 | Status: SHIPPED | OUTPATIENT
Start: 2025-01-03

## 2025-01-03 RX ORDER — GABAPENTIN 300 MG/1
300 CAPSULE ORAL 3 TIMES DAILY
Qty: 90 CAPSULE | Refills: 0 | Status: SHIPPED | OUTPATIENT
Start: 2025-01-03

## 2025-01-13 RX ORDER — KETOCONAZOLE 20 MG/G
CREAM TOPICAL
Qty: 60 G | Refills: 0 | Status: SHIPPED | OUTPATIENT
Start: 2025-01-13

## 2025-01-13 RX ORDER — BUPROPION HYDROCHLORIDE 100 MG/1
TABLET, EXTENDED RELEASE ORAL
Qty: 180 TABLET | Refills: 0 | Status: SHIPPED | OUTPATIENT
Start: 2025-01-13

## 2025-01-27 RX ORDER — TADALAFIL 20 MG/1
TABLET ORAL
Qty: 90 TABLET | Refills: 0 | Status: SHIPPED | OUTPATIENT
Start: 2025-01-27

## 2025-02-10 RX ORDER — KETOCONAZOLE 20 MG/G
CREAM TOPICAL
Qty: 60 G | Refills: 0 | Status: SHIPPED | OUTPATIENT
Start: 2025-02-10

## 2025-02-19 RX ORDER — FLECAINIDE ACETATE 50 MG/1
50 TABLET ORAL 2 TIMES DAILY
Qty: 180 TABLET | Refills: 0 | Status: SHIPPED | OUTPATIENT
Start: 2025-02-19

## 2025-02-27 RX ORDER — KETOCONAZOLE 20 MG/ML
SHAMPOO, SUSPENSION TOPICAL
Qty: 120 ML | Refills: 0 | Status: SHIPPED | OUTPATIENT
Start: 2025-02-27

## 2025-03-03 RX ORDER — FLUCONAZOLE 100 MG/1
100 TABLET ORAL DAILY
Qty: 10 TABLET | Refills: 0 | OUTPATIENT
Start: 2025-03-03

## 2025-03-06 NOTE — TELEPHONE ENCOUNTER
Last follow up: 11/25/2024    Last urine drug screen: Not on file    Last contract/consent date: Not on file    Does pt utilize . [facility]: No

## 2025-03-07 RX ORDER — GABAPENTIN 300 MG/1
300 CAPSULE ORAL 3 TIMES DAILY
Qty: 90 CAPSULE | Refills: 0 | Status: SHIPPED | OUTPATIENT
Start: 2025-03-07

## 2025-03-26 ENCOUNTER — OFFICE VISIT (OUTPATIENT)
Dept: INTERNAL MEDICINE | Facility: CLINIC | Age: 65
End: 2025-03-26
Payer: COMMERCIAL

## 2025-03-26 VITALS
DIASTOLIC BLOOD PRESSURE: 70 MMHG | RESPIRATION RATE: 16 BRPM | HEIGHT: 72 IN | HEART RATE: 73 BPM | SYSTOLIC BLOOD PRESSURE: 136 MMHG | OXYGEN SATURATION: 96 % | WEIGHT: 242 LBS | BODY MASS INDEX: 32.78 KG/M2 | TEMPERATURE: 97.8 F

## 2025-03-26 DIAGNOSIS — E78.5 HYPERLIPIDEMIA LDL GOAL <100: Primary | ICD-10-CM

## 2025-03-26 DIAGNOSIS — I48.0 PAROXYSMAL ATRIAL FIBRILLATION: ICD-10-CM

## 2025-03-26 DIAGNOSIS — M72.2 PLANTAR FASCIITIS: ICD-10-CM

## 2025-03-26 DIAGNOSIS — L98.9 SKIN LESION: ICD-10-CM

## 2025-03-26 DIAGNOSIS — N52.9 ERECTILE DYSFUNCTION, UNSPECIFIED ERECTILE DYSFUNCTION TYPE: ICD-10-CM

## 2025-03-26 DIAGNOSIS — E11.40 TYPE 2 DIABETES MELLITUS WITH DIABETIC NEUROPATHY, WITHOUT LONG-TERM CURRENT USE OF INSULIN: ICD-10-CM

## 2025-03-26 LAB
ALBUMIN SERPL-MCNC: 4.3 G/DL (ref 3.5–5.2)
ALBUMIN UR-MCNC: <1.2 MG/DL
ALBUMIN/GLOB SERPL: 1.5 G/DL
ALP SERPL-CCNC: 69 U/L (ref 39–117)
ALT SERPL W P-5'-P-CCNC: 25 U/L (ref 1–41)
ANION GAP SERPL CALCULATED.3IONS-SCNC: 10.5 MMOL/L (ref 5–15)
AST SERPL-CCNC: 19 U/L (ref 1–40)
BASOPHILS # BLD AUTO: 0.08 10*3/MM3 (ref 0–0.2)
BASOPHILS NFR BLD AUTO: 1.1 % (ref 0–1.5)
BILIRUB SERPL-MCNC: 0.4 MG/DL (ref 0–1.2)
BUN SERPL-MCNC: 11 MG/DL (ref 8–23)
BUN/CREAT SERPL: 10.6 (ref 7–25)
CALCIUM SPEC-SCNC: 9.9 MG/DL (ref 8.6–10.5)
CHLORIDE SERPL-SCNC: 104 MMOL/L (ref 98–107)
CHOLEST SERPL-MCNC: 173 MG/DL (ref 0–200)
CO2 SERPL-SCNC: 22.5 MMOL/L (ref 22–29)
CREAT SERPL-MCNC: 1.04 MG/DL (ref 0.76–1.27)
CREAT UR-MCNC: 31.4 MG/DL
DEPRECATED RDW RBC AUTO: 39.6 FL (ref 37–54)
EGFRCR SERPLBLD CKD-EPI 2021: 80.2 ML/MIN/1.73
EOSINOPHIL # BLD AUTO: 0.14 10*3/MM3 (ref 0–0.4)
EOSINOPHIL NFR BLD AUTO: 1.9 % (ref 0.3–6.2)
ERYTHROCYTE [DISTWIDTH] IN BLOOD BY AUTOMATED COUNT: 12.4 % (ref 12.3–15.4)
GLOBULIN UR ELPH-MCNC: 2.9 GM/DL
GLUCOSE SERPL-MCNC: 146 MG/DL (ref 65–99)
HBA1C MFR BLD: 8 % (ref 4.8–5.6)
HCT VFR BLD AUTO: 52 % (ref 37.5–51)
HDLC SERPL-MCNC: 46 MG/DL (ref 40–60)
HGB BLD-MCNC: 17.8 G/DL (ref 13–17.7)
IMM GRANULOCYTES # BLD AUTO: 0.03 10*3/MM3 (ref 0–0.05)
IMM GRANULOCYTES NFR BLD AUTO: 0.4 % (ref 0–0.5)
LDLC SERPL CALC-MCNC: 95 MG/DL (ref 0–100)
LDLC/HDLC SERPL: 1.94 {RATIO}
LYMPHOCYTES # BLD AUTO: 1.88 10*3/MM3 (ref 0.7–3.1)
LYMPHOCYTES NFR BLD AUTO: 25.1 % (ref 19.6–45.3)
MCH RBC QN AUTO: 30 PG (ref 26.6–33)
MCHC RBC AUTO-ENTMCNC: 34.2 G/DL (ref 31.5–35.7)
MCV RBC AUTO: 87.5 FL (ref 79–97)
MICROALBUMIN/CREAT UR: NORMAL MG/G{CREAT}
MONOCYTES # BLD AUTO: 0.6 10*3/MM3 (ref 0.1–0.9)
MONOCYTES NFR BLD AUTO: 8 % (ref 5–12)
NEUTROPHILS NFR BLD AUTO: 4.77 10*3/MM3 (ref 1.7–7)
NEUTROPHILS NFR BLD AUTO: 63.5 % (ref 42.7–76)
NRBC BLD AUTO-RTO: 0 /100 WBC (ref 0–0.2)
PLATELET # BLD AUTO: 201 10*3/MM3 (ref 140–450)
PMV BLD AUTO: 10.1 FL (ref 6–12)
POTASSIUM SERPL-SCNC: 4.4 MMOL/L (ref 3.5–5.2)
PROT SERPL-MCNC: 7.2 G/DL (ref 6–8.5)
RBC # BLD AUTO: 5.94 10*6/MM3 (ref 4.14–5.8)
SODIUM SERPL-SCNC: 137 MMOL/L (ref 136–145)
TESTOST SERPL-MCNC: 325 NG/DL (ref 193–740)
TRIGL SERPL-MCNC: 188 MG/DL (ref 0–150)
TSH SERPL DL<=0.05 MIU/L-ACNC: 1.27 UIU/ML (ref 0.27–4.2)
VLDLC SERPL-MCNC: 32 MG/DL (ref 5–40)
WBC NRBC COR # BLD AUTO: 7.5 10*3/MM3 (ref 3.4–10.8)

## 2025-03-26 PROCEDURE — 80061 LIPID PANEL: CPT | Performed by: INTERNAL MEDICINE

## 2025-03-26 PROCEDURE — 84403 ASSAY OF TOTAL TESTOSTERONE: CPT | Performed by: INTERNAL MEDICINE

## 2025-03-26 PROCEDURE — 82043 UR ALBUMIN QUANTITATIVE: CPT | Performed by: INTERNAL MEDICINE

## 2025-03-26 PROCEDURE — 83036 HEMOGLOBIN GLYCOSYLATED A1C: CPT | Performed by: INTERNAL MEDICINE

## 2025-03-26 PROCEDURE — 80050 GENERAL HEALTH PANEL: CPT | Performed by: INTERNAL MEDICINE

## 2025-03-26 PROCEDURE — 82570 ASSAY OF URINE CREATININE: CPT | Performed by: INTERNAL MEDICINE

## 2025-03-26 RX ORDER — DEXLANSOPRAZOLE 60 MG/1
60 CAPSULE, DELAYED RELEASE ORAL DAILY PRN
Qty: 90 CAPSULE | Refills: 1 | Status: SHIPPED | OUTPATIENT
Start: 2025-03-26

## 2025-03-26 RX ORDER — FLUCONAZOLE 100 MG/1
100 TABLET ORAL AS NEEDED
COMMUNITY
End: 2025-03-26 | Stop reason: SDUPTHER

## 2025-03-26 RX ORDER — MONTELUKAST SODIUM 10 MG/1
10 TABLET ORAL EVERY EVENING
Qty: 90 TABLET | Refills: 0 | Status: SHIPPED | OUTPATIENT
Start: 2025-03-26

## 2025-03-26 RX ORDER — BENZONATATE 100 MG/1
100 CAPSULE ORAL 3 TIMES DAILY PRN
COMMUNITY

## 2025-03-26 RX ORDER — TIRZEPATIDE 12.5 MG/.5ML
12.5 INJECTION, SOLUTION SUBCUTANEOUS WEEKLY
Qty: 6 ML | Refills: 1 | Status: SHIPPED | OUTPATIENT
Start: 2025-03-26

## 2025-03-26 RX ORDER — BUPROPION HYDROCHLORIDE 100 MG/1
100 TABLET, EXTENDED RELEASE ORAL 2 TIMES DAILY
Qty: 180 TABLET | Refills: 0 | Status: SHIPPED | OUTPATIENT
Start: 2025-03-26

## 2025-03-26 RX ORDER — CYCLOBENZAPRINE HCL 10 MG
10 TABLET ORAL 3 TIMES DAILY PRN
Qty: 90 TABLET | Refills: 0 | Status: SHIPPED | OUTPATIENT
Start: 2025-03-26

## 2025-03-26 RX ORDER — FLUCONAZOLE 100 MG/1
100 TABLET ORAL AS NEEDED
Qty: 30 TABLET | Refills: 1 | Status: SHIPPED | OUTPATIENT
Start: 2025-03-26

## 2025-03-26 RX ORDER — ATORVASTATIN CALCIUM 20 MG/1
20 TABLET, FILM COATED ORAL DAILY
Qty: 90 TABLET | Refills: 0 | Status: SHIPPED | OUTPATIENT
Start: 2025-03-26

## 2025-03-26 RX ORDER — GABAPENTIN 300 MG/1
300 CAPSULE ORAL 3 TIMES DAILY
Qty: 90 CAPSULE | Refills: 0 | Status: SHIPPED | OUTPATIENT
Start: 2025-03-26

## 2025-03-26 RX ORDER — DEXLANSOPRAZOLE 60 MG/1
60 CAPSULE, DELAYED RELEASE ORAL DAILY PRN
COMMUNITY
End: 2025-03-26 | Stop reason: SDUPTHER

## 2025-03-26 RX ORDER — MUPIROCIN 20 MG/G
1 OINTMENT TOPICAL 3 TIMES DAILY
Qty: 1 G | Refills: 2 | Status: SHIPPED | OUTPATIENT
Start: 2025-03-26

## 2025-03-26 RX ORDER — EMPAGLIFLOZIN AND METFORMIN HYDROCHLORIDE 12.5; 5 MG/1; MG/1
1 TABLET ORAL
Qty: 180 TABLET | Refills: 0 | Status: SHIPPED | OUTPATIENT
Start: 2025-03-26

## 2025-03-26 NOTE — ASSESSMENT & PLAN NOTE
Orders:    gabapentin (NEURONTIN) 300 MG capsule; Take 1 capsule by mouth 3 (Three) Times a Day.    CBC & Differential    Comprehensive Metabolic Panel    Lipid Panel    TSH    Hemoglobin A1c    Microalbumin / Creatinine Urine Ratio - Urine, Clean Catch; Future    Ambulatory Referral to Urology

## 2025-03-26 NOTE — LETTER
[facility]  Vaccine Consent Form    Patient Name:  Dallas Hurt  Patient :  1960     Vaccine(s) Ordered    MMR Vaccine Subcutaneous        Screening Checklist  The following questions should be completed prior to vaccination. If you answer “yes” to any question, it does not necessarily mean you should not be vaccinated. It just means we may need to clarify or ask more questions. If a question is unclear, please ask your healthcare provider to explain it.    Yes No   Any fever or moderate to severe illness today (mild illness and/or antibiotic treatment are not contraindications)?     Do you have a history of a serious reaction to any previous vaccinations, such as anaphylaxis, encephalopathy within 7 days, Guillain-Zanoni syndrome within 6 weeks, seizure?     Have you received any live vaccine(s) (e.g MMR, JACOBO) or any other vaccines in the last month (to ensure duplicate doses aren't given)?     Do you have an anaphylactic allergy to latex (DTaP, DTaP-IPV, Hep A, Hep B, MenB, RV, Td, Tdap), baker’s yeast (Hep B, HPV), polysorbates (RSV, nirsevimab, PCV 20, Rotavirrus, Tdap, Shingrix), or gelatin (JACOBO, MMR)?     Do you have an anaphylactic allergy to neomycin (Rabies, JACOBO, MMR, IPV, Hep A), polymyxin B (IPV), or streptomycin (IPV)?      Any cancer, leukemia, AIDS, or other immune system disorder? (JACOBO, MMR, RV)     Do you have a parent, brother, or sister with an immune system problem (if immune competence of vaccine recipient clinically verified, can proceed)? (MMR, JACOBO)     Any recent steroid treatments for >2 weeks, chemotherapy, or radiation treatment? (JACOBO, MMR)     Have you received antibody-containing blood transfusions or IVIG in the past 11 months (recommended interval is dependent on product)? (MMR, JACOBO)     Have you taken antiviral drugs (acyclovir, famciclovir, valacyclovir for JACOBO) in the last 24 or 48 hours, respectively?      Are you pregnant or planning to become pregnant within 1 month?  "(JACOBO, MMR, HPV, IPV, MenB, Abrexvy; For Hep B- refer to Engerix-B; For RSV - Abrysvo is indicated for 32-36 weeks of pregnancy from September to January)     For infants, have you ever been told your child has had intussusception or a medical emergency involving obstruction of the intestine (Rotavirus)? If not for an infant, can skip this question.         *Ordering Physicians/APC should be consulted if \"yes\" is checked by the patient or guardian above.  I have received, read, and understand the Vaccine Information Statement (VIS) for each vaccine ordered.  I have considered my or my child's health status as well as the health status of my close contacts.  I have taken the opportunity to discuss my vaccine questions with my or my child's health care provider.   I have requested that the ordered vaccine(s) be given to me or my child.  I understand the benefits and risks of the vaccines.  I understand that I should remain in the clinic for 15 minutes after receiving the vaccine(s).  _________________________________________________________  Signature of Patient or Parent/Legal Guardian ____________________  Date     "

## 2025-03-26 NOTE — PROGRESS NOTES
Chief Complaint  Diabetes (4 mo f/u, would like A1C drawn. ), Hyperlipidemia, Earache (Sore on right ear, was bleeding), and Foot Injury (Had cortisone shot in the back of left heel)      Subjective      History of Present Illness  The patient presents for evaluation of diabetes, erectile dysfunction, foot pain, and ear lesion.    He seeks refills for stomach medicine, Flexeril, creams, and yeast infection pills. He tolerates Mounjaro well and requests a 3-month supply. Occasionally experiences nausea, vomiting, and diarrhea from diabetes injections. Reports general well-being but occasional fatigue. Recent A1c test result was 8; previous levels were 7 and 8. Maintains a balanced diet, occasionally indulging in cookies, ice cream sandwiches, French fries, and Butterfinger bars, but avoids sugary drinks.    Receives foot injections every 3 weeks from a specialist in [place], which have been beneficial. Experienced a flare-up recently and requests an injection today. Continues recommended exercises and applies cream to feet and knees daily, which is effective.    Experiencing erectile dysfunction; previously consulted Dr. Coughlin who suggested self-injection therapy. Considering this option if effects last 12-18 months. Tried a pump with some effectiveness but struggles to maintain an erection. Viagra was ineffective. Testosterone levels are normal; interested in increasing them with cardiologist's clearance. Previously used testosterone gel without issues.    Recently started using hearing aids and noticed blood on his pillow from a healed ear lesion. Concerned about skin cancer. Spent two days outdoors without a hat, resulting in sunburn on lips and nose. Occasionally has bumps on his head and requests a shampoo.    Reports thin, brittle, splitting fingernails. Does not take a daily multivitamin. Currently on heart attack prevention medication.             Objective   Vital Signs:   Vitals:    03/26/25 0857   BP:  "136/70   BP Location: Left arm   Patient Position: Sitting   Cuff Size: Adult   Pulse: 73   Resp: 16   Temp: 97.8 °F (36.6 °C)   TempSrc: Temporal   SpO2: 96%   Weight: 110 kg (242 lb)   Height: 182.9 cm (72\")     Body mass index is 32.82 kg/m².    Wt Readings from Last 3 Encounters:   03/26/25 110 kg (242 lb)   12/23/24 111 kg (245 lb)   11/25/24 108 kg (238 lb 9.6 oz)     BP Readings from Last 3 Encounters:   03/26/25 136/70   12/23/24 120/68   11/25/24 128/68       Health Maintenance   Topic Date Due    DIABETIC EYE EXAM  Never done    ANNUAL PHYSICAL  Never done    URINE MICROALBUMIN-CREATININE RATIO (uACR)  11/11/2021    COVID-19 Vaccine (5 - 2024-25 season) 09/01/2024    HEMOGLOBIN A1C  02/28/2025    DIABETIC FOOT EXAM  05/23/2025    LIPID PANEL  11/25/2025    COLORECTAL CANCER SCREENING  12/28/2026    TDAP/TD VACCINES (3 - Td or Tdap) 11/25/2034    HEPATITIS C SCREENING  Completed    INFLUENZA VACCINE  Completed    Pneumococcal Vaccine 50+  Completed    ZOSTER VACCINE  Completed    LUNG CANCER SCREENING  Discontinued       Physical Exam  Vitals reviewed.   Constitutional:       Appearance: Normal appearance. He is well-developed.   HENT:      Head: Normocephalic and atraumatic.      Right Ear: External ear normal.      Left Ear: External ear normal.      Ears:      Comments: Dry spot on outer ear  Eyes:      Conjunctiva/sclera: Conjunctivae normal.      Pupils: Pupils are equal, round, and reactive to light.   Cardiovascular:      Rate and Rhythm: Normal rate and regular rhythm.      Heart sounds: No murmur heard.     No friction rub. No gallop.   Pulmonary:      Effort: Pulmonary effort is normal.      Breath sounds: Normal breath sounds. No wheezing or rhonchi.   Skin:     General: Skin is warm and dry.   Neurological:      Mental Status: He is alert and oriented to person, place, and time.   Psychiatric:         Mood and Affect: Affect normal.         Behavior: Behavior normal.         Thought Content: " Thought content normal.        Physical Exam        Result Review :  The following data was reviewed by: Ana Moreno MD on 03/26/2025:         Results  Reviewed recent blood work            Procedures            Assessment & Plan  Type 2 diabetes mellitus with diabetic neuropathy, without long-term current use of insulin      Orders:    gabapentin (NEURONTIN) 300 MG capsule; Take 1 capsule by mouth 3 (Three) Times a Day.    Empagliflozin-metFORMIN HCl (Synjardy) 12.5-500 MG tablet; Take 1 tablet by mouth 2 (Two) Times a Day Before Meals.    Tirzepatide (Mounjaro) 12.5 MG/0.5ML solution auto-injector; Inject 0.5 mL under the skin into the appropriate area as directed 1 (One) Time Per Week.    CBC & Differential    Comprehensive Metabolic Panel    Lipid Panel    TSH    Hemoglobin A1c    Microalbumin / Creatinine Urine Ratio - Urine, Clean Catch; Future    Ambulatory Referral to Urology    Testosterone; Future    Hyperlipidemia LDL goal <100       Orders:    Lipid Panel    Paroxysmal atrial fibrillation    Orders:    gabapentin (NEURONTIN) 300 MG capsule; Take 1 capsule by mouth 3 (Three) Times a Day.    CBC & Differential    Comprehensive Metabolic Panel    Lipid Panel    TSH    Hemoglobin A1c    Microalbumin / Creatinine Urine Ratio - Urine, Clean Catch; Future    Ambulatory Referral to Urology    Plantar fasciitis         Erectile dysfunction, unspecified erectile dysfunction type    Orders:    Ambulatory Referral to Urology    Testosterone; Future    Skin lesion    Orders:    Ambulatory Referral to Dermatology         Assessment & Plan  Diabetes Mellitus  - A1c levels consistently 7-8, acceptable  - Tolerates Mounjaro well  - Provide 3-month prescription refill  - Order blood work and urine test for proteinuria    Erectile Dysfunction  - Consulted Dr. Coughlin  - Testosterone levels at lower end of normal  - Refer to urology for further evaluation and management  - Check testosterone levels today    Foot  Pain  - Receives beneficial foot injections every 3 weeks  - Experienced recent flare-up; request injection today  - Continues recommended exercises and applies cream daily    Ear Lesion  - Potential skin cancer or bacterial infection  - Prescribe cream  - Refer to dermatology for evaluation and possible biopsy    Health Maintenance  - Qualifies for measles booster  - Administer measles vaccine today    Medication Management  - Provide gabapentin refill    Fingernail issues  - Reports thin, brittle, splitting fingernails  - Advise daily multivitamin  - Currently on heart attack prevention medication    Follow-up  - Follow-up in 3 months    Patient or patient representative verbalized consent for the use of Ambient Listening during the visit with  Ana Moreno MD for chart documentation. 3/26/2025  10:30 EDT      FOLLOW UP  Return in about 4 months (around 7/26/2025).  Patient was given instructions and counseling regarding his condition or for health maintenance advice. Please see specific information pulled into the AVS if appropriate.     Ana Moreno MD  03/26/25  10:42 EDT    CURRENT & DISCONTINUED MEDICATIONS  Current Outpatient Medications   Medication Instructions    Accu-Chek Guide test strip USE TO TEST BLOOD SUGAR THREE TIMES DAILY AS NEEDED. USE AS DIRECTED    Accu-Chek Softclix Lancets lancets USE AS DIRECTED THREE TIMES DAILY    atorvastatin (LIPITOR) 20 mg, Oral, Daily    benzonatate (TESSALON) 100 mg, 3 Times Daily PRN    betamethasone dipropionate 0.05 % lotion APPLY TOPICALLY TO THE APPROPRIATE AREA AS DIRECTED TWO TIMES PER DAY    buPROPion SR (WELLBUTRIN SR) 100 mg, Oral, 2 Times Daily    CBD oil (cannabidiol) capsule 1 capsule, 2 Times Daily    clindamycin (CLINDAGEL) 1 % gel 1 application , Topical, 2 Times Daily    cyclobenzaprine (FLEXERIL) 10 mg, Oral, 3 Times Daily PRN, for muscle spams    dexlansoprazole (DEXILANT) 60 mg, Oral, Daily PRN    Diclofenac Sodium (VOLTAREN) 1 % gel  gel Topical, 4 Times Daily PRN    Empagliflozin-metFORMIN HCl (Synjardy) 12.5-500 MG tablet 1 tablet, Oral, 2 Times Daily Before Meals    flecainide (TAMBOCOR) 50 mg, Oral, 2 Times Daily    fluconazole (DIFLUCAN) 100 mg, Oral, As Needed    gabapentin (NEURONTIN) 300 mg, Oral, 3 Times Daily    glucose monitor monitoring kit 1 each, Not Applicable, 2 Times Daily PRN, DX: E11.9    hydrocortisone 1 % cream 1 Application, Topical, 2 Times Daily    ketoconazole (NIZORAL) 2 % cream APPLY TOPICALLY TO THE AFFECTED AREA(S) TWICE DAILY.    ketoconazole (NIZORAL) 2 % shampoo APPLY TOPICALLY TO THE APPROPRIATE AREA AS DIRECTED TWO TIMES PER WEEK.    montelukast (SINGULAIR) 10 mg, Oral, Every Evening    Mounjaro 12.5 mg, Subcutaneous, Weekly    mupirocin (BACTROBAN) 2 % ointment 1 Application, Topical, 3 Times Daily    tadalafil (CIALIS) 20 MG tablet TAKE 1 TABLET BY MOUTH ONCE DAILY AS NEEDED FOR  ERECTYLE  DYSFUNCTION    triamcinolone (KENALOG) 0.5 % ointment APPLY OINTMENT TOPICALLY TO THE APPROPRIATE AREA TWICE DAILY AS DIRECTED       Medications Discontinued During This Encounter   Medication Reason    cyclobenzaprine (FLEXERIL) 10 MG tablet Reorder    montelukast (SINGULAIR) 10 MG tablet Reorder    Synjardy 12.5-500 MG tablet Reorder    Mounjaro 12.5 MG/0.5ML solution auto-injector Reorder    atorvastatin (LIPITOR) 20 MG tablet Reorder    buPROPion SR (WELLBUTRIN SR) 100 MG 12 hr tablet Reorder    gabapentin (NEURONTIN) 300 MG capsule Reorder    dexlansoprazole (DEXILANT) 60 MG capsule Reorder    fluconazole (DIFLUCAN) 100 MG tablet Reorder

## 2025-03-26 NOTE — ASSESSMENT & PLAN NOTE
Orders:    gabapentin (NEURONTIN) 300 MG capsule; Take 1 capsule by mouth 3 (Three) Times a Day.    Empagliflozin-metFORMIN HCl (Synjardy) 12.5-500 MG tablet; Take 1 tablet by mouth 2 (Two) Times a Day Before Meals.    Tirzepatide (Mounjaro) 12.5 MG/0.5ML solution auto-injector; Inject 0.5 mL under the skin into the appropriate area as directed 1 (One) Time Per Week.    CBC & Differential    Comprehensive Metabolic Panel    Lipid Panel    TSH    Hemoglobin A1c    Microalbumin / Creatinine Urine Ratio - Urine, Clean Catch; Future    Ambulatory Referral to Urology    Testosterone; Future

## 2025-03-28 ENCOUNTER — RESULTS FOLLOW-UP (OUTPATIENT)
Dept: INTERNAL MEDICINE | Facility: CLINIC | Age: 65
End: 2025-03-28
Payer: COMMERCIAL

## 2025-04-02 ENCOUNTER — PRIOR AUTHORIZATION (OUTPATIENT)
Dept: INTERNAL MEDICINE | Facility: CLINIC | Age: 65
End: 2025-04-02
Payer: COMMERCIAL

## 2025-04-02 NOTE — TELEPHONE ENCOUNTER
Synjardy 12.5-500MG tablets    As long as you remain covered by your prescription drug plan and there are no changes to your  plan benefits, this request is approved from 04/02/2025 to 04/02/2026.

## 2025-05-04 DIAGNOSIS — I48.0 PAROXYSMAL ATRIAL FIBRILLATION: ICD-10-CM

## 2025-05-04 DIAGNOSIS — E11.40 TYPE 2 DIABETES MELLITUS WITH DIABETIC NEUROPATHY, WITHOUT LONG-TERM CURRENT USE OF INSULIN: ICD-10-CM

## 2025-05-05 RX ORDER — FLECAINIDE ACETATE 50 MG/1
50 TABLET ORAL 2 TIMES DAILY
Qty: 180 TABLET | Refills: 0 | Status: SHIPPED | OUTPATIENT
Start: 2025-05-05

## 2025-05-05 RX ORDER — GABAPENTIN 300 MG/1
300 CAPSULE ORAL 3 TIMES DAILY
Qty: 90 CAPSULE | Refills: 0 | Status: SHIPPED | OUTPATIENT
Start: 2025-05-05

## 2025-05-05 NOTE — TELEPHONE ENCOUNTER
Last follow up visit date: 3/26/25    Last urine drug screen date: Not on file    Last consent/contract date: Not on file    Does patient utilize [facility] pharmacy (yes or no)? No

## 2025-06-02 RX ORDER — KETOCONAZOLE 20 MG/ML
SHAMPOO, SUSPENSION TOPICAL
Qty: 120 ML | Refills: 0 | Status: SHIPPED | OUTPATIENT
Start: 2025-06-02

## 2025-06-05 RX ORDER — BUPROPION HYDROCHLORIDE 100 MG/1
100 TABLET, EXTENDED RELEASE ORAL 2 TIMES DAILY
Qty: 180 TABLET | Refills: 0 | Status: SHIPPED | OUTPATIENT
Start: 2025-06-05

## 2025-06-08 DIAGNOSIS — E11.40 TYPE 2 DIABETES MELLITUS WITH DIABETIC NEUROPATHY, WITHOUT LONG-TERM CURRENT USE OF INSULIN: ICD-10-CM

## 2025-06-08 DIAGNOSIS — I48.0 PAROXYSMAL ATRIAL FIBRILLATION: ICD-10-CM

## 2025-06-08 RX ORDER — BENZONATATE 100 MG/1
100 CAPSULE ORAL 3 TIMES DAILY PRN
Qty: 60 CAPSULE | Refills: 0 | Status: SHIPPED | OUTPATIENT
Start: 2025-06-08

## 2025-06-09 RX ORDER — GABAPENTIN 300 MG/1
300 CAPSULE ORAL 3 TIMES DAILY
Qty: 90 CAPSULE | Refills: 0 | Status: SHIPPED | OUTPATIENT
Start: 2025-06-09

## 2025-06-09 RX ORDER — KETOCONAZOLE 20 MG/G
CREAM TOPICAL
Qty: 60 G | Refills: 0 | Status: SHIPPED | OUTPATIENT
Start: 2025-06-09

## 2025-06-16 RX ORDER — MUPIROCIN 20 MG/G
OINTMENT TOPICAL
Qty: 22 G | Refills: 0 | Status: SHIPPED | OUTPATIENT
Start: 2025-06-16

## 2025-06-28 DIAGNOSIS — E11.40 TYPE 2 DIABETES MELLITUS WITH DIABETIC NEUROPATHY, WITHOUT LONG-TERM CURRENT USE OF INSULIN: ICD-10-CM

## 2025-06-30 RX ORDER — EMPAGLIFLOZIN AND METFORMIN HYDROCHLORIDE 12.5; 5 MG/1; MG/1
1 TABLET ORAL
Qty: 180 TABLET | Refills: 0 | Status: SHIPPED | OUTPATIENT
Start: 2025-06-30

## 2025-06-30 NOTE — PROGRESS NOTES
Chief Complaint: Erectile Dysfunction    Subjective         History of Present Illness  Dallas Hurt is a 65 y.o. male presents to [facility] to be seen for ED.    History of Present Illness  The patient is a 65-year-old male who presents for evaluation of erectile dysfunction.    He has previously tried both Cialis and Viagra, with the latter causing severe headaches. He reports that his testosterone levels are within the normal range, but at the lower end. He experiences difficulty in maintaining an erection, even during masturbation. He has used a band to aid in maintaining an erection, which he found helpful. He is considering penile injections as a treatment option.     He reports frequent urination, approximately every 45 minutes, and occasional dribbling. He uses a pad to prevent soiling his clothes. He typically wakes up once or twice at night to urinate.     His urinary stream is normal, and he reports no blood in his urine.     He has a history of kidney stones, which he passed naturally, and has not undergone any surgeries on his bladder, kidneys, or prostate.    He has a history of atrial fibrillation and underwent an ablation procedure. He is not currently on any blood thinners.    He is diabetic and his blood sugar levels are well-controlled, averaging around 150 in the morning. His A1c was 8 in 03/2025. He is on Mounjaro and Synjardy.        PSA  2/1/2021 0.31  6/21/2019 0.37    Objective     Past Medical History:   Diagnosis Date    Atrial fibrillation     Atrial flutter     Diabetes mellitus     Hyperlipidemia     Ingrown toenail     Low testosterone        Past Surgical History:   Procedure Laterality Date    ABLATION OF DYSRHYTHMIC FOCUS      COLONOSCOPY           Current Outpatient Medications:     Accu-Chek Guide test strip, USE TO TEST BLOOD SUGAR THREE TIMES DAILY AS NEEDED. USE AS DIRECTED, Disp: 100 each, Rfl: 0    Accu-Chek Softclix Lancets lancets, USE AS  DIRECTED THREE TIMES DAILY, Disp: 100 each, Rfl: 0    atorvastatin (LIPITOR) 20 MG tablet, Take 1 tablet by mouth Daily., Disp: 90 tablet, Rfl: 0    benzonatate (TESSALON) 100 MG capsule, Take 1 capsule by mouth three times daily as needed for cough, Disp: 60 capsule, Rfl: 0    buPROPion SR (WELLBUTRIN SR) 100 MG 12 hr tablet, Take 1 tablet by mouth twice daily, Disp: 180 tablet, Rfl: 0    CBD oil (cannabidiol) capsule, Take 1 capsule by mouth 2 (Two) Times a Day., Disp: , Rfl:     cyclobenzaprine (FLEXERIL) 10 MG tablet, Take 1 tablet by mouth 3 (Three) Times a Day As Needed for Muscle Spasms. for muscle spams, Disp: 90 tablet, Rfl: 0    dexlansoprazole (DEXILANT) 60 MG capsule, Take 1 capsule by mouth Daily As Needed (as needed)., Disp: 90 capsule, Rfl: 1    flecainide (TAMBOCOR) 50 MG tablet, Take 1 tablet by mouth twice daily, Disp: 180 tablet, Rfl: 0    fluconazole (DIFLUCAN) 100 MG tablet, Take 1 tablet by mouth As Needed (as needed)., Disp: 30 tablet, Rfl: 1    gabapentin (NEURONTIN) 300 MG capsule, TAKE 1 CAPSULE BY MOUTH THREE TIMES DAILY, Disp: 90 capsule, Rfl: 0    glucose monitor monitoring kit, 1 each 2 (Two) Times a Day As Needed (to test blood sugar). DX: E11.9, Disp: 1 each, Rfl: 0    ketoconazole (NIZORAL) 2 % shampoo, APPLY TOPICALLY TO THE APPROPRIATE AREA AS DIRECTED TWO TIMES PER WEEK., Disp: 120 mL, Rfl: 0    montelukast (SINGULAIR) 10 MG tablet, Take 1 tablet by mouth Every Evening., Disp: 90 tablet, Rfl: 0    Synjardy 12.5-500 MG tablet, TAKE 1 TABLET BY MOUTH TWICE DAILY BEFORE MEAL(S), Disp: 180 tablet, Rfl: 0    tadalafil (CIALIS) 20 MG tablet, TAKE 1 TABLET BY MOUTH ONCE DAILY AS NEEDED FOR  ERECTYLE  DYSFUNCTION, Disp: 90 tablet, Rfl: 0    Tirzepatide (Mounjaro) 12.5 MG/0.5ML solution auto-injector, Inject 0.5 mL under the skin into the appropriate area as directed 1 (One) Time Per Week., Disp: 6 mL, Rfl: 1    betamethasone dipropionate 0.05 % lotion, APPLY TOPICALLY TO THE APPROPRIATE  AREA AS DIRECTED TWO TIMES PER DAY (Patient not taking: Reported on 7/1/2025), Disp: 60 mL, Rfl: 0    clindamycin (CLINDAGEL) 1 % gel, Apply 1 application topically to the appropriate area as directed 2 (Two) Times a Day. (Patient not taking: Reported on 7/1/2025), Disp: 60 g, Rfl: 0    Diclofenac Sodium (VOLTAREN) 1 % gel gel, Apply  topically to the appropriate area as directed 4 (Four) Times a Day As Needed (USE 4 TIMES DAILY AS  NEEDED FOR PAIN). (Patient not taking: Reported on 7/1/2025), Disp: 350 g, Rfl: 1    hydrocortisone 1 % cream, Apply 1 Application topically to the appropriate area as directed 2 (Two) Times a Day. (Patient not taking: Reported on 7/1/2025), Disp: 20 g, Rfl: 1    ketoconazole (NIZORAL) 2 % cream, APPLY TOPICALLY TO THE AFFECTED AREA(S) TWICE DAILY. (Patient not taking: Reported on 7/1/2025), Disp: 60 g, Rfl: 0    mupirocin (BACTROBAN) 2 % ointment, APPLY 1 APPLACATION TOPICALLY TO THE APPROPRIATE AREA AS DIRECTED THREE TIMES DAILY (Patient not taking: Reported on 7/1/2025), Disp: 22 g, Rfl: 0    triamcinolone (KENALOG) 0.5 % ointment, APPLY OINTMENT TOPICALLY TO THE APPROPRIATE AREA TWICE DAILY AS DIRECTED (Patient not taking: Reported on 7/1/2025), Disp: 15 g, Rfl: 0    Allergies   Allergen Reactions    Morphine And Codeine Swelling        Family History   Problem Relation Age of Onset    Diabetes Other     Diabetes Mother     Atrial fibrillation Father     Diabetes Father     Cancer Father     Atrial fibrillation Brother     Hypertension Brother     Aneurysm Paternal Grandmother     No Known Problems Maternal Grandmother     No Known Problems Maternal Grandfather     No Known Problems Paternal Grandfather        Social History     Socioeconomic History    Marital status:    Tobacco Use    Smoking status: Never     Passive exposure: Yes    Smokeless tobacco: Never    Tobacco comments:     CAFFEINE USE: 2 CUPS COFFEE DAILY/ 2 LITER COKE ZERO DAILY   Vaping Use    Vaping status:  "Never Used   Substance and Sexual Activity    Alcohol use: Yes     Alcohol/week: 3.0 standard drinks of alcohol     Types: 3 Shots of liquor per week    Drug use: No    Sexual activity: Defer       Vital Signs:   Resp 14   Ht 182.9 cm (72\")   Wt 110 kg (242 lb 8.1 oz)   BMI 32.89 kg/m²      Physical Exam  Vitals reviewed.   Constitutional:       Appearance: Normal appearance.   Neurological:      General: No focal deficit present.      Mental Status: He is alert and oriented to person, place, and time.   Psychiatric:         Mood and Affect: Mood normal.         Behavior: Behavior normal.          Result Review :   The following data was reviewed by: VENITA Silverio on 07/01/2025:         Results        Procedures        Assessment and Plan    Diagnoses and all orders for this visit:    1. Other male erectile dysfunction (Primary)        Assessment & Plan  1. Erectile Dysfunction.  He has tried both Cialis and Viagra in the past, with significant headaches from Viagra.       The option of penile injections was discussed, including the need for refrigeration and the cost.     The potential side effects of these injections, such as pain, stinging, and burning, were also explained.     The possibility of a penile implant was considered, with a detailed explanation provided. The cost of the implant would depend on his insurance coverage.  We will release through the .  He will be contacted to let them know whether this is covered.    He was advised to maintain good control of his blood sugar levels to ensure optimal healing post-surgery.     If he decides to proceed with the injections, he will inform us so that a sample vial can be ordered for him to administer in the clinic.    2. Diabetes Mellitus.  Diabetes is currently managed with Mounjaro and Synjardy. Blood sugar levels are generally well-controlled, with morning readings around 150 mg/dL. He is scheduled to see Dr. Moreno in July " for lab work, including checking his A1c. He was advised to continue monitoring his blood sugar levels and maintain a balanced diet.          Follow Up   No follow-ups on file.  Patient was given instructions and counseling regarding his condition or for health maintenance advice. Please see specific information pulled into the AVS if appropriate.         This document has been electronically signed by VENITA Sivlerio  July 1, 2025 10:28 EDT     Patient or patient representative verbalized consent for the use of Ambient Listening during the visit with  VENITA Silverio for chart documentation. 7/1/2025  10:26 EDT

## 2025-07-01 ENCOUNTER — OFFICE VISIT (OUTPATIENT)
Dept: UROLOGY | Age: 65
End: 2025-07-01
Payer: COMMERCIAL

## 2025-07-01 VITALS — WEIGHT: 242.51 LBS | RESPIRATION RATE: 14 BRPM | BODY MASS INDEX: 32.85 KG/M2 | HEIGHT: 72 IN

## 2025-07-01 DIAGNOSIS — N52.8 OTHER MALE ERECTILE DYSFUNCTION: Primary | ICD-10-CM

## 2025-07-08 RX ORDER — KETOCONAZOLE 20 MG/G
CREAM TOPICAL
Qty: 60 G | Refills: 0 | Status: SHIPPED | OUTPATIENT
Start: 2025-07-08

## 2025-07-17 DIAGNOSIS — I48.0 PAROXYSMAL ATRIAL FIBRILLATION: ICD-10-CM

## 2025-07-17 DIAGNOSIS — E11.40 TYPE 2 DIABETES MELLITUS WITH DIABETIC NEUROPATHY, WITHOUT LONG-TERM CURRENT USE OF INSULIN: ICD-10-CM

## 2025-07-18 RX ORDER — GABAPENTIN 300 MG/1
300 CAPSULE ORAL 3 TIMES DAILY
Qty: 90 CAPSULE | Refills: 0 | Status: SHIPPED | OUTPATIENT
Start: 2025-07-18

## 2025-07-18 RX ORDER — ATORVASTATIN CALCIUM 20 MG/1
20 TABLET, FILM COATED ORAL DAILY
Qty: 90 TABLET | Refills: 0 | Status: SHIPPED | OUTPATIENT
Start: 2025-07-18

## 2025-07-18 RX ORDER — MONTELUKAST SODIUM 10 MG/1
10 TABLET ORAL EVERY EVENING
Qty: 90 TABLET | Refills: 0 | Status: SHIPPED | OUTPATIENT
Start: 2025-07-18

## 2025-07-18 RX ORDER — TADALAFIL 20 MG/1
TABLET ORAL
Qty: 90 TABLET | Refills: 0 | Status: SHIPPED | OUTPATIENT
Start: 2025-07-18

## 2025-07-18 NOTE — TELEPHONE ENCOUNTER
Last follow up visit date: 3/26/25    Last urine drug screen date: n/a    Last consent/contract date: n/a     Does patient utilize [facility] pharmacy (yes or no)? No

## 2025-07-23 ENCOUNTER — TELEPHONE (OUTPATIENT)
Dept: UROLOGY | Age: 65
End: 2025-07-23

## 2025-08-04 RX ORDER — KETOCONAZOLE 20 MG/G
CREAM TOPICAL
Qty: 60 G | Refills: 0 | Status: SHIPPED | OUTPATIENT
Start: 2025-08-04

## 2025-08-04 RX ORDER — FLECAINIDE ACETATE 50 MG/1
50 TABLET ORAL 2 TIMES DAILY
Qty: 180 TABLET | Refills: 0 | Status: SHIPPED | OUTPATIENT
Start: 2025-08-04

## 2025-08-07 DIAGNOSIS — E11.40 TYPE 2 DIABETES MELLITUS WITH DIABETIC NEUROPATHY, WITHOUT LONG-TERM CURRENT USE OF INSULIN: ICD-10-CM

## 2025-08-07 DIAGNOSIS — I48.0 PAROXYSMAL ATRIAL FIBRILLATION: ICD-10-CM

## 2025-08-07 RX ORDER — GABAPENTIN 300 MG/1
300 CAPSULE ORAL 3 TIMES DAILY
Qty: 90 CAPSULE | Refills: 0 | Status: SHIPPED | OUTPATIENT
Start: 2025-08-07